# Patient Record
Sex: FEMALE | Race: WHITE | NOT HISPANIC OR LATINO | Employment: UNEMPLOYED | URBAN - METROPOLITAN AREA
[De-identification: names, ages, dates, MRNs, and addresses within clinical notes are randomized per-mention and may not be internally consistent; named-entity substitution may affect disease eponyms.]

---

## 2023-03-22 DIAGNOSIS — F41.9 ANXIETY: Primary | ICD-10-CM

## 2023-03-22 RX ORDER — BUPROPION HYDROCHLORIDE 150 MG/1
TABLET, EXTENDED RELEASE ORAL
COMMUNITY
End: 2023-03-22 | Stop reason: SDUPTHER

## 2023-03-23 RX ORDER — BUPROPION HYDROCHLORIDE 150 MG/1
TABLET, EXTENDED RELEASE ORAL
Qty: 180 TABLET | Refills: 0 | Status: SHIPPED | OUTPATIENT
Start: 2023-03-23 | End: 2023-06-30

## 2023-06-30 DIAGNOSIS — F41.9 ANXIETY: ICD-10-CM

## 2023-06-30 PROBLEM — M54.50 ACUTE LOW BACK PAIN: Status: ACTIVE | Noted: 2023-06-30

## 2023-06-30 PROBLEM — M17.9 ARTHRITIS OF KNEE, DEGENERATIVE: Status: ACTIVE | Noted: 2023-06-30

## 2023-06-30 PROBLEM — K62.82 ANAL DYSPLASIA: Status: ACTIVE | Noted: 2023-06-30

## 2023-06-30 PROBLEM — F33.1 MODERATE EPISODE OF RECURRENT MAJOR DEPRESSIVE DISORDER (MULTI): Status: ACTIVE | Noted: 2023-06-30

## 2023-06-30 PROBLEM — L30.9 DERMATITIS: Status: ACTIVE | Noted: 2023-06-30

## 2023-06-30 PROBLEM — F32.A DEPRESSION: Status: ACTIVE | Noted: 2023-06-30

## 2023-06-30 PROBLEM — M25.569 JOINT PAIN, KNEE: Status: ACTIVE | Noted: 2023-06-30

## 2023-06-30 PROBLEM — M25.579 ANKLE PAIN: Status: ACTIVE | Noted: 2023-06-30

## 2023-06-30 PROBLEM — F45.42 PAIN DISORDER ASSOCIATED WITH PSYCHOLOGICAL AND PHYSICAL FACTORS: Status: ACTIVE | Noted: 2023-06-30

## 2023-06-30 PROBLEM — E78.5 HYPERLIPIDEMIA: Status: ACTIVE | Noted: 2023-06-30

## 2023-06-30 PROBLEM — I42.9 CARDIOMYOPATHY (MULTI): Status: ACTIVE | Noted: 2023-06-30

## 2023-06-30 RX ORDER — ESCITALOPRAM OXALATE 20 MG/1
1 TABLET ORAL DAILY
COMMUNITY
Start: 2023-02-09 | End: 2024-04-04 | Stop reason: SDUPTHER

## 2023-06-30 RX ORDER — ASPIRIN 81 MG/1
1 TABLET ORAL DAILY
COMMUNITY
Start: 2021-06-29

## 2023-06-30 RX ORDER — HYDROXYZINE HYDROCHLORIDE 10 MG/1
TABLET, FILM COATED ORAL
COMMUNITY

## 2023-06-30 RX ORDER — FUROSEMIDE 40 MG/1
1 TABLET ORAL DAILY
COMMUNITY
Start: 2019-10-16

## 2023-06-30 RX ORDER — IBUPROFEN 800 MG/1
TABLET ORAL
COMMUNITY
Start: 2013-05-06

## 2023-06-30 RX ORDER — SENNOSIDES 25 MG/1
TABLET, FILM COATED ORAL
COMMUNITY
Start: 2023-02-02

## 2023-06-30 RX ORDER — VENLAFAXINE HYDROCHLORIDE 225 MG/1
225 TABLET, EXTENDED RELEASE ORAL DAILY
COMMUNITY
End: 2024-04-04 | Stop reason: WASHOUT

## 2023-06-30 RX ORDER — ATORVASTATIN CALCIUM 40 MG/1
40 TABLET, FILM COATED ORAL NIGHTLY
COMMUNITY

## 2023-06-30 RX ORDER — BACITRACIN 500 [USP'U]/G
OINTMENT TOPICAL
COMMUNITY
Start: 2023-03-10

## 2023-06-30 RX ORDER — BENZONATATE 100 MG/1
CAPSULE ORAL
COMMUNITY
Start: 2014-01-23

## 2023-06-30 RX ORDER — OXYCODONE HYDROCHLORIDE 5 MG/1
CAPSULE ORAL
COMMUNITY
Start: 2023-03-10 | End: 2024-04-04 | Stop reason: WASHOUT

## 2023-06-30 RX ORDER — ACETAMINOPHEN 325 MG/1
TABLET ORAL
COMMUNITY
Start: 2023-03-10

## 2023-06-30 RX ORDER — BUPROPION HYDROCHLORIDE 150 MG/1
TABLET, EXTENDED RELEASE ORAL
Qty: 180 TABLET | Refills: 0 | Status: SHIPPED | OUTPATIENT
Start: 2023-06-30 | End: 2023-10-02

## 2023-06-30 RX ORDER — CARVEDILOL 6.25 MG/1
2 TABLET ORAL 2 TIMES DAILY
COMMUNITY
Start: 2019-12-09 | End: 2024-03-04 | Stop reason: SDUPTHER

## 2023-06-30 RX ORDER — POLYETHYLENE GLYCOL 3350 17 G/17G
POWDER, FOR SOLUTION ORAL
COMMUNITY
Start: 2023-03-10

## 2023-06-30 RX ORDER — RAMIPRIL 5 MG/1
10 CAPSULE ORAL DAILY
COMMUNITY
End: 2024-02-27 | Stop reason: SDUPTHER

## 2023-06-30 RX ORDER — LIDOCAINE HYDROCHLORIDE AND HYDROCORTISONE ACETATE 30; 25 MG/G; MG/G
GEL RECTAL
COMMUNITY
Start: 2021-07-16

## 2023-10-02 DIAGNOSIS — F41.9 ANXIETY: ICD-10-CM

## 2023-10-02 RX ORDER — BUPROPION HYDROCHLORIDE 150 MG/1
TABLET, EXTENDED RELEASE ORAL
Qty: 180 TABLET | Refills: 0 | Status: SHIPPED | OUTPATIENT
Start: 2023-10-02 | End: 2024-01-03

## 2024-01-03 DIAGNOSIS — F41.9 ANXIETY: ICD-10-CM

## 2024-01-03 RX ORDER — BUPROPION HYDROCHLORIDE 150 MG/1
TABLET, EXTENDED RELEASE ORAL
Qty: 180 TABLET | Refills: 0 | Status: SHIPPED | OUTPATIENT
Start: 2024-01-03 | End: 2024-03-27

## 2024-01-11 ENCOUNTER — OFFICE VISIT (OUTPATIENT)
Dept: SURGERY | Facility: CLINIC | Age: 64
End: 2024-01-11
Payer: COMMERCIAL

## 2024-01-11 VITALS
BODY MASS INDEX: 27.64 KG/M2 | HEART RATE: 60 BPM | RESPIRATION RATE: 16 BRPM | WEIGHT: 137.1 LBS | SYSTOLIC BLOOD PRESSURE: 148 MMHG | HEIGHT: 59 IN | DIASTOLIC BLOOD PRESSURE: 88 MMHG

## 2024-01-11 DIAGNOSIS — K62.82 ANAL DYSPLASIA: Primary | ICD-10-CM

## 2024-01-11 PROCEDURE — 99213 OFFICE O/P EST LOW 20 MIN: CPT | Performed by: SURGERY

## 2024-01-11 PROCEDURE — 46600 DIAGNOSTIC ANOSCOPY SPX: CPT | Performed by: SURGERY

## 2024-01-11 NOTE — PROGRESS NOTES
No chief complaint on file.      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 She has been treated by Dr. Enriquez in the past with botox injections. she has not noticed any new lesions or changes.     She is still dealing with her chronic back pain. She is seeing her PCP next week. She has been having urgency with BM's. Her stools are soft. She was taking Meloxicam previously and contributes that to her loose stools. She has been taking probiotics as well which she thinks have helped.     HIV status: negative  Smoking status: current smoker    Previous High Resolution Anoscopies and Cytologies have shown:     Colonoscopy 8/25/21 ([facility])- Complete to ileocecal valve. No polyps or other abnormalities seen.   8/2021 HRA: LSIL & HSIL   10/2021 HRA: Focally invasive moderately differentiated scc arising from a background of HSIL   3/2023 HRA: HSIL     Past Medical History  She  Past Medical History:   Diagnosis Date    Personal history of other diseases of the musculoskeletal system and connective tissue     Personal history of arthritis       Surgical History  She  Past Surgical History:   Procedure Laterality Date    OTHER SURGICAL HISTORY  07/01/2021    Vascular bypass        Social History  She has no history on file for tobacco use, alcohol use, and drug use.    Family History  No family history on file.     Allergies  Pregabalin, Sulfa (sulfonamide antibiotics), and Sulfamethoxazole    Home Medications  Prior to Admission medications    Medication Sig Start Date End Date Taking? Authorizing Provider   acetaminophen (Tylenol) 325 mg tablet TAKE 2 TABLETS BY MOUTH EVERY 6 HOURS FOR PAIN 3/10/23  Yes Historical Provider, MD   aspirin 81 mg EC tablet Take 1 tablet (81 mg) by mouth once daily. 6/29/21  Yes Historical Provider, MD   atorvastatin (Lipitor) 40 mg tablet Take 1 tablet (40 mg) by mouth once daily at bedtime.   Yes  Historical Provider, MD   bacitracin 500 unit/gram ointment APPLY TOPICALLY TO AFFECTED AREA ONCE A DAY TO PREVENT ADHERENCE TO GAUZE DRESSINGS 3/10/23  Yes Historical Provider, MD   buPROPion SR (Wellbutrin SR) 150 mg 12 hr tablet TAKE 1 TABLET BY MOUTH DAILY FOR 3 DAYS THEN INCREASE TO 1 TABLET TWICE DAILY 1/3/24  Yes Dari Negro MD   carvedilol (Coreg) 6.25 mg tablet Take 2 tablets (12.5 mg) by mouth 2 times a day. 12/9/19  Yes Historical Provider, MD   furosemide (Lasix) 40 mg tablet Take 1 tablet (40 mg) by mouth once daily. 10/16/19  Yes Historical Provider, MD   hydrOXYzine HCL (Atarax) 10 mg tablet Take by mouth.   Yes Historical Provider, MD   ibuprofen 800 mg tablet Take by mouth. 5/6/13  Yes Historical Provider, MD   oxyCODONE (Oxy-IR) 5 mg immediate release capsule TAKE 1 CAPSULE BY MOUTH EVERY 6 TO 8 HOURS AS NEEDED FOR PAIN 3/10/23  Yes Historical Provider, MD   polyethylene glycol (Glycolax) 17 gram/dose powder MIX AND DRINK 17 GRAMS BY MOUTH ONCE DAILY TO PREVENT CONSTIPATION 3/10/23  Yes Historical Provider, MD   ramipril (Altace) 5 mg capsule Take 2 capsules (10 mg) by mouth once daily.   Yes Historical Provider, MD   venlafaxine 225 mg 24 hr tablet Take 1 tablet (225 mg) by mouth once daily.   Yes Historical Provider, MD   benzonatate (Tessalon) 100 mg capsule Take by mouth. 1/23/14   Historical Provider, MD   escitalopram (Lexapro) 20 mg tablet Take 1 tablet (20 mg) by mouth once daily. 2/9/23   Historical Provider, MD   lidocaine (Xylocaine) 5 % cream cream USE AS DIRECTED. 2/2/23   Historical Provider, MD   lidocaine-hydrocortisone-aloe 3-2.5 % (7 gram) kit Insert into the rectum. 7/16/21   Historical Provider, MD       Review of Systems     Physical Exam    Perineal/UMAIR:  Perineum: well healed, some scarring  UMAIR: normal  Tone: normal    Anoscopy    Date/Time: 1/11/2024 12:06 PM    Performed by: Josie Lepe MD  Authorized by: Josie Lepe MD    Consent:     Consent  "obtained:  Verbal    Consent given by:  Patient    Risks, benefits, and alternatives were discussed: yes      Risks discussed:  Bleeding and pain  Universal protocol:     Procedure explained and questions answered to patient or proxy's satisfaction: yes      Patient identity confirmed:  Verbally with patient  Indications:     Indications comment:  Anal dysplasia  Post-procedure details:     Procedure completion:  Tolerated well, no immediate complications      Last Recorded Vitals  Blood pressure 148/88, pulse 60, resp. rate 16, height 1.499 m (4' 11\"), weight 62.2 kg (137 lb 1.6 oz).      Assessment and Plan  63 y.o. female here for follow up of anal dysplasia.     Normal exam today. Doing really well.     I emphasized the significant possibility of recurrence and the need for close follow-up. We discussed barrier protection when sexually active, non-smoking, and taking HIV medications.    "

## 2024-01-16 ENCOUNTER — APPOINTMENT (OUTPATIENT)
Dept: SURGERY | Facility: CLINIC | Age: 64
End: 2024-01-16
Payer: COMMERCIAL

## 2024-02-26 ENCOUNTER — TELEPHONE (OUTPATIENT)
Dept: PRIMARY CARE | Facility: CLINIC | Age: 64
End: 2024-02-26
Payer: COMMERCIAL

## 2024-02-27 DIAGNOSIS — I10 HTN (HYPERTENSION), BENIGN: Primary | ICD-10-CM

## 2024-02-27 RX ORDER — RAMIPRIL 10 MG/1
10 CAPSULE ORAL DAILY
Qty: 90 CAPSULE | Refills: 1 | Status: SHIPPED | OUTPATIENT
Start: 2024-02-27

## 2024-03-04 DIAGNOSIS — I42.9 CARDIOMYOPATHY, UNSPECIFIED TYPE (MULTI): Primary | ICD-10-CM

## 2024-03-05 RX ORDER — CARVEDILOL 6.25 MG/1
12.5 TABLET ORAL 2 TIMES DAILY
Qty: 360 TABLET | Refills: 1 | Status: SHIPPED | OUTPATIENT
Start: 2024-03-05

## 2024-03-27 DIAGNOSIS — F41.9 ANXIETY: ICD-10-CM

## 2024-03-27 RX ORDER — BUPROPION HYDROCHLORIDE 150 MG/1
TABLET, EXTENDED RELEASE ORAL
Qty: 180 TABLET | Refills: 3 | Status: SHIPPED | OUTPATIENT
Start: 2024-03-27 | End: 2024-04-04 | Stop reason: WASHOUT

## 2024-04-03 ASSESSMENT — PROMIS GLOBAL HEALTH SCALE
RATE_SOCIAL_SATISFACTION: FAIR
CARRYOUT_PHYSICAL_ACTIVITIES: MODERATELY
RATE_MENTAL_HEALTH: GOOD
EMOTIONAL_PROBLEMS: SOMETIMES
RATE_QUALITY_OF_LIFE: FAIR
RATE_AVERAGE_FATIGUE: MODERATE
RATE_GENERAL_HEALTH: GOOD
RATE_AVERAGE_PAIN: 8
CARRYOUT_SOCIAL_ACTIVITIES: POOR
RATE_PHYSICAL_HEALTH: FAIR

## 2024-04-04 ENCOUNTER — OFFICE VISIT (OUTPATIENT)
Dept: PRIMARY CARE | Facility: CLINIC | Age: 64
End: 2024-04-04
Payer: COMMERCIAL

## 2024-04-04 VITALS
WEIGHT: 141 LBS | HEART RATE: 65 BPM | BODY MASS INDEX: 30.42 KG/M2 | HEIGHT: 57 IN | DIASTOLIC BLOOD PRESSURE: 80 MMHG | SYSTOLIC BLOOD PRESSURE: 130 MMHG | OXYGEN SATURATION: 95 %

## 2024-04-04 DIAGNOSIS — F32.A DEPRESSION, UNSPECIFIED DEPRESSION TYPE: ICD-10-CM

## 2024-04-04 DIAGNOSIS — K21.9 GASTROESOPHAGEAL REFLUX DISEASE, UNSPECIFIED WHETHER ESOPHAGITIS PRESENT: ICD-10-CM

## 2024-04-04 DIAGNOSIS — E78.5 HYPERLIPIDEMIA, UNSPECIFIED HYPERLIPIDEMIA TYPE: ICD-10-CM

## 2024-04-04 DIAGNOSIS — F17.200 SMOKER: ICD-10-CM

## 2024-04-04 DIAGNOSIS — Z00.00 HEALTH MAINTENANCE EXAMINATION: Primary | ICD-10-CM

## 2024-04-04 DIAGNOSIS — Z12.31 ENCOUNTER FOR SCREENING MAMMOGRAM FOR MALIGNANT NEOPLASM OF BREAST: ICD-10-CM

## 2024-04-04 LAB
25(OH)D3 SERPL-MCNC: 34 NG/ML (ref 30–100)
ALBUMIN SERPL BCP-MCNC: 4.4 G/DL (ref 3.4–5)
ALP SERPL-CCNC: 124 U/L (ref 33–136)
ALT SERPL W P-5'-P-CCNC: 16 U/L (ref 7–45)
ANION GAP SERPL CALC-SCNC: 14 MMOL/L (ref 10–20)
AST SERPL W P-5'-P-CCNC: 17 U/L (ref 9–39)
BILIRUB SERPL-MCNC: 0.5 MG/DL (ref 0–1.2)
BUN SERPL-MCNC: 10 MG/DL (ref 6–23)
CALCIUM SERPL-MCNC: 9.6 MG/DL (ref 8.6–10.6)
CHLORIDE SERPL-SCNC: 102 MMOL/L (ref 98–107)
CHOLEST SERPL-MCNC: 147 MG/DL (ref 0–199)
CHOLESTEROL/HDL RATIO: 4.1
CO2 SERPL-SCNC: 29 MMOL/L (ref 21–32)
CREAT SERPL-MCNC: 0.7 MG/DL (ref 0.5–1.05)
EGFRCR SERPLBLD CKD-EPI 2021: >90 ML/MIN/1.73M*2
GLUCOSE SERPL-MCNC: NORMAL MG/DL
HDLC SERPL-MCNC: 35.7 MG/DL
LDLC SERPL CALC-MCNC: 84 MG/DL
NON HDL CHOLESTEROL: 111 MG/DL (ref 0–149)
POTASSIUM SERPL-SCNC: 4.3 MMOL/L (ref 3.5–5.3)
PROT SERPL-MCNC: 6.5 G/DL (ref 6.4–8.2)
SODIUM SERPL-SCNC: 141 MMOL/L (ref 136–145)
TRIGL SERPL-MCNC: 135 MG/DL (ref 0–149)
TSH SERPL-ACNC: 1.88 MIU/L (ref 0.44–3.98)
VLDL: 27 MG/DL (ref 0–40)

## 2024-04-04 PROCEDURE — 85027 COMPLETE CBC AUTOMATED: CPT

## 2024-04-04 PROCEDURE — 84155 ASSAY OF PROTEIN SERUM: CPT

## 2024-04-04 PROCEDURE — 82310 ASSAY OF CALCIUM: CPT

## 2024-04-04 PROCEDURE — 99213 OFFICE O/P EST LOW 20 MIN: CPT | Performed by: FAMILY MEDICINE

## 2024-04-04 PROCEDURE — 82247 BILIRUBIN TOTAL: CPT

## 2024-04-04 PROCEDURE — 36415 COLL VENOUS BLD VENIPUNCTURE: CPT

## 2024-04-04 PROCEDURE — 83036 HEMOGLOBIN GLYCOSYLATED A1C: CPT

## 2024-04-04 PROCEDURE — 84520 ASSAY OF UREA NITROGEN: CPT

## 2024-04-04 PROCEDURE — 84443 ASSAY THYROID STIM HORMONE: CPT

## 2024-04-04 PROCEDURE — 82565 ASSAY OF CREATININE: CPT

## 2024-04-04 PROCEDURE — 99396 PREV VISIT EST AGE 40-64: CPT | Performed by: FAMILY MEDICINE

## 2024-04-04 PROCEDURE — 84450 TRANSFERASE (AST) (SGOT): CPT

## 2024-04-04 PROCEDURE — 80051 ELECTROLYTE PANEL: CPT

## 2024-04-04 PROCEDURE — 4004F PT TOBACCO SCREEN RCVD TLK: CPT | Performed by: FAMILY MEDICINE

## 2024-04-04 PROCEDURE — 84075 ASSAY ALKALINE PHOSPHATASE: CPT

## 2024-04-04 PROCEDURE — 82040 ASSAY OF SERUM ALBUMIN: CPT

## 2024-04-04 PROCEDURE — 84460 ALANINE AMINO (ALT) (SGPT): CPT

## 2024-04-04 PROCEDURE — 82306 VITAMIN D 25 HYDROXY: CPT

## 2024-04-04 PROCEDURE — 80061 LIPID PANEL: CPT

## 2024-04-04 RX ORDER — ESCITALOPRAM OXALATE 20 MG/1
20 TABLET ORAL DAILY
Qty: 90 TABLET | Refills: 1 | Status: SHIPPED | OUTPATIENT
Start: 2024-04-04

## 2024-04-04 RX ORDER — PANTOPRAZOLE SODIUM 40 MG/1
40 TABLET, DELAYED RELEASE ORAL DAILY
Qty: 30 TABLET | Refills: 1 | Status: SHIPPED | OUTPATIENT
Start: 2024-04-04 | End: 2024-05-29

## 2024-04-04 RX ORDER — BUPROPION HYDROCHLORIDE 300 MG/1
300 TABLET ORAL EVERY MORNING
Qty: 90 TABLET | Refills: 1 | Status: SHIPPED | OUTPATIENT
Start: 2024-04-04

## 2024-04-04 ASSESSMENT — ENCOUNTER SYMPTOMS
LOSS OF SENSATION IN FEET: 0
DEPRESSION: 0
OCCASIONAL FEELINGS OF UNSTEADINESS: 0

## 2024-04-04 ASSESSMENT — PATIENT HEALTH QUESTIONNAIRE - PHQ9
6. FEELING BAD ABOUT YOURSELF - OR THAT YOU ARE A FAILURE OR HAVE LET YOURSELF OR YOUR FAMILY DOWN: NOT AT ALL
SUM OF ALL RESPONSES TO PHQ QUESTIONS 1-9: 13
5. POOR APPETITE OR OVEREATING: NEARLY EVERY DAY
10. IF YOU CHECKED OFF ANY PROBLEMS, HOW DIFFICULT HAVE THESE PROBLEMS MADE IT FOR YOU TO DO YOUR WORK, TAKE CARE OF THINGS AT HOME, OR GET ALONG WITH OTHER PEOPLE: SOMEWHAT DIFFICULT
3. TROUBLE FALLING OR STAYING ASLEEP OR SLEEPING TOO MUCH: NEARLY EVERY DAY
2. FEELING DOWN, DEPRESSED OR HOPELESS: MORE THAN HALF THE DAYS
9. THOUGHTS THAT YOU WOULD BE BETTER OFF DEAD, OR OF HURTING YOURSELF: NOT AT ALL
1. LITTLE INTEREST OR PLEASURE IN DOING THINGS: MORE THAN HALF THE DAYS
4. FEELING TIRED OR HAVING LITTLE ENERGY: NEARLY EVERY DAY
SUM OF ALL RESPONSES TO PHQ9 QUESTIONS 1 AND 2: 4
8. MOVING OR SPEAKING SO SLOWLY THAT OTHER PEOPLE COULD HAVE NOTICED. OR THE OPPOSITE, BEING SO FIGETY OR RESTLESS THAT YOU HAVE BEEN MOVING AROUND A LOT MORE THAN USUAL: NOT AT ALL
7. TROUBLE CONCENTRATING ON THINGS, SUCH AS READING THE NEWSPAPER OR WATCHING TELEVISION: NOT AT ALL

## 2024-04-04 NOTE — ASSESSMENT & PLAN NOTE
Recommended screening guidelines addressed and orders placed as indicated by age and chronic conditions  Screening labs ordered, will call with results  Encourage pt to get mammogram  Smoking cessation encouraged at length, will obtain lung CT for screening  Continue to work on healthy lifestyle including well balanced diet, regular activity, limit alcohol, no tobacco products and safe sexual practices  Follow up annually

## 2024-04-04 NOTE — PROGRESS NOTES
Reason for Visit: Annual Physical Exam    HPI:  HTN: taking medications as prescribed    Depression: continues to struggle.  Was seeing therapist and psychiatrist virtually but did not like doing that.  Was on another medication instead of Lexapro but did not feel like it helped.  Very overwhelmed with tasks.  Difficult to sleep. No Si/HI. Interested in therapy.    Continues to smoke, tries to quit but has difficulty.  Has never gone extended time without smoking.    Continues to follow with colorectal for anal dysplasia, normal recent anoscopy.      Feels that acid reflux is significant.  Gets abdominal bloating with food.     Active Problem List  Patient Active Problem List   Diagnosis    Acute low back pain    Anal dysplasia    Ankle pain    Arthritis of knee, degenerative    Cardiomyopathy (CMS/HCC)    Depression    Dermatitis    Hyperlipidemia    Joint pain, knee    Moderate episode of recurrent major depressive disorder (CMS/HCC)    Pain disorder associated with psychological and physical factors    Gastroesophageal reflux disease    Health maintenance examination       Comprehensive Medical/Surgical/Social/Family History  Past Medical History:   Diagnosis Date    Personal history of other diseases of the musculoskeletal system and connective tissue     Personal history of arthritis     Past Surgical History:   Procedure Laterality Date    OTHER SURGICAL HISTORY  07/01/2021    Vascular bypass     Social History     Tobacco Use    Smoking status: Every Day     Packs/day: 1.00     Years: 15.00     Additional pack years: 0.00     Total pack years: 15.00     Types: Cigarettes    Smokeless tobacco: Never   Substance Use Topics    Alcohol use: Never    Drug use: Never     No family history on file.      Allergies and Medications  Pregabalin, Sulfa (sulfonamide antibiotics), and Sulfamethoxazole  Current Outpatient Medications on File Prior to Visit   Medication Sig Dispense Refill    acetaminophen (Tylenol) 325 mg  "tablet TAKE 2 TABLETS BY MOUTH EVERY 6 HOURS FOR PAIN      aspirin 81 mg EC tablet Take 1 tablet (81 mg) by mouth once daily.      atorvastatin (Lipitor) 40 mg tablet Take 1 tablet (40 mg) by mouth once daily at bedtime.      bacitracin 500 unit/gram ointment APPLY TOPICALLY TO AFFECTED AREA ONCE A DAY TO PREVENT ADHERENCE TO GAUZE DRESSINGS      carvedilol (Coreg) 6.25 mg tablet Take 2 tablets (12.5 mg) by mouth 2 times a day. 360 tablet 1    furosemide (Lasix) 40 mg tablet Take 1 tablet (40 mg) by mouth once daily.      hydrOXYzine HCL (Atarax) 10 mg tablet Take by mouth.      ibuprofen 800 mg tablet Take by mouth.      lidocaine (Xylocaine) 5 % cream cream USE AS DIRECTED.      ramipril (Altace) 10 mg capsule Take 1 capsule (10 mg) by mouth once daily. 90 capsule 1    [DISCONTINUED] buPROPion SR (Wellbutrin SR) 150 mg 12 hr tablet TAKE 1 TABLET BY MOUTH DAILY X3 DAYS, THEN INCREASE TO 1 TABLET TWICE DAILY 180 tablet 3    [DISCONTINUED] escitalopram (Lexapro) 20 mg tablet Take 1 tablet (20 mg) by mouth once daily.      benzonatate (Tessalon) 100 mg capsule Take by mouth.      lidocaine-hydrocortisone-aloe 3-2.5 % (7 gram) kit Insert into the rectum.      polyethylene glycol (Glycolax) 17 gram/dose powder MIX AND DRINK 17 GRAMS BY MOUTH ONCE DAILY TO PREVENT CONSTIPATION      [DISCONTINUED] oxyCODONE (Oxy-IR) 5 mg immediate release capsule TAKE 1 CAPSULE BY MOUTH EVERY 6 TO 8 HOURS AS NEEDED FOR PAIN      [DISCONTINUED] venlafaxine 225 mg 24 hr tablet Take 1 tablet (225 mg) by mouth once daily.       No current facility-administered medications on file prior to visit.         ROS otherwise negative aside from what was mentioned above in HPI.    Vitals  /80 (BP Location: Right arm, Patient Position: Sitting, BP Cuff Size: Adult)   Pulse 65   Ht 1.448 m (4' 9\")   Wt 64 kg (141 lb)   LMP  (LMP Unknown)   SpO2 95%   BMI 30.51 kg/m²   Body mass index is 30.51 kg/m².  Physical Exam  Gen: Alert, NAD  HEENT:  " PERRL, EOMI, conjunctiva and sclera normal in appearance. External auditory canals/TMs normal; Oral cavity and posterior pharynx without lesions/exudate  Neck:  Supple with FROM; No masses/nodes palpable; Thyroid nontender and without nodules; No ELIJAH  Respiratory:  Lungs CTAB  Cardiovascular:  Heart RRR. No M/R/G. Peripheral pulses equal bilaterally  Abdomen:  Soft, nontender, No R/G/R; No HSM or masses palpated  Extremities:  FROM all extremities; Muscle strength grossly normal with good tone  Neuro:  CN II-XII intact; Gross motor and sensory intact  Skin:  No suspicious lesions present    Assessment and Plan:  Problem List Items Addressed This Visit       Depression    Current Assessment & Plan     Will increase Wellbutrin to 300mg daily, can consider goal of 450mg if tolerating  Continue Lexapro  Referral to [facility] for therapy  Continue to monitor         Relevant Medications    buPROPion XL (Wellbutrin XL) 300 mg 24 hr tablet    escitalopram (Lexapro) 20 mg tablet    Other Relevant Orders    TSH with reflex to Free T4 if abnormal    Follow [facility]    Hyperlipidemia    Relevant Orders    Lipid Panel    Hemoglobin A1C    Gastroesophageal reflux disease    Current Assessment & Plan     One month trial of PPI  If symptoms fail to improve can consider GI referral         Relevant Medications    pantoprazole (ProtoNix) 40 mg EC tablet    Other Relevant Orders    CBC    Comprehensive Metabolic Panel    Health maintenance examination - Primary    Current Assessment & Plan     Recommended screening guidelines addressed and orders placed as indicated by age and chronic conditions  Screening labs ordered, will call with results  Encourage pt to get mammogram  Smoking cessation encouraged at length, will obtain lung CT for screening  Continue to work on healthy lifestyle including well balanced diet, regular activity, limit alcohol, no tobacco  products and safe sexual practices  Follow up annually           Relevant Orders    Comprehensive Metabolic Panel    TSH with reflex to Free T4 if abnormal    Lipid Panel    Hemoglobin A1C    Vitamin D 25-Hydroxy,Total (for eval of Vitamin D levels)     Other Visit Diagnoses       Smoker        Relevant Orders    CT lung screening low dose    Encounter for screening mammogram for malignant neoplasm of breast        Relevant Orders    BI mammo bilateral screening tomosynthesis              Dari Negro MD

## 2024-04-04 NOTE — ASSESSMENT & PLAN NOTE
Will increase Wellbutrin to 300mg daily, can consider goal of 450mg if tolerating  Continue Lexapro  Referral to [facility] for therapy  Continue to monitor

## 2024-04-05 LAB
ERYTHROCYTE [DISTWIDTH] IN BLOOD BY AUTOMATED COUNT: 13 % (ref 11.5–14.5)
EST. AVERAGE GLUCOSE BLD GHB EST-MCNC: 103 MG/DL
HBA1C MFR BLD: 5.2 %
HCT VFR BLD AUTO: 45.7 % (ref 36–46)
HGB BLD-MCNC: 15.1 G/DL (ref 12–16)
MCH RBC QN AUTO: 31.1 PG (ref 26–34)
MCHC RBC AUTO-ENTMCNC: 33 G/DL (ref 32–36)
MCV RBC AUTO: 94 FL (ref 80–100)
NRBC BLD-RTO: 0 /100 WBCS (ref 0–0)
PLATELET # BLD AUTO: 180 X10*3/UL (ref 150–450)
RBC # BLD AUTO: 4.86 X10*6/UL (ref 4–5.2)
WBC # BLD AUTO: 6.4 X10*3/UL (ref 4.4–11.3)

## 2024-04-17 ENCOUNTER — HOSPITAL ENCOUNTER (OUTPATIENT)
Dept: RADIOLOGY | Facility: CLINIC | Age: 64
Discharge: HOME | End: 2024-04-17
Payer: COMMERCIAL

## 2024-04-17 VITALS — BODY MASS INDEX: 29.12 KG/M2 | HEIGHT: 57 IN | WEIGHT: 135 LBS

## 2024-04-17 DIAGNOSIS — Z12.31 ENCOUNTER FOR SCREENING MAMMOGRAM FOR MALIGNANT NEOPLASM OF BREAST: ICD-10-CM

## 2024-04-17 PROCEDURE — 77063 BREAST TOMOSYNTHESIS BI: CPT | Performed by: RADIOLOGY

## 2024-04-17 PROCEDURE — 77067 SCR MAMMO BI INCL CAD: CPT

## 2024-04-17 PROCEDURE — 77067 SCR MAMMO BI INCL CAD: CPT | Performed by: RADIOLOGY

## 2024-04-19 DIAGNOSIS — R92.8 ABNORMAL MAMMOGRAM OF LEFT BREAST: Primary | ICD-10-CM

## 2024-04-24 ENCOUNTER — HOSPITAL ENCOUNTER (OUTPATIENT)
Dept: RADIOLOGY | Facility: CLINIC | Age: 64
Discharge: HOME | End: 2024-04-24
Payer: COMMERCIAL

## 2024-04-24 DIAGNOSIS — R92.8 ABNORMAL MAMMOGRAM OF LEFT BREAST: ICD-10-CM

## 2024-04-24 PROCEDURE — 76642 ULTRASOUND BREAST LIMITED: CPT | Mod: LT

## 2024-04-24 PROCEDURE — 77061 BREAST TOMOSYNTHESIS UNI: CPT | Mod: LEFT SIDE | Performed by: STUDENT IN AN ORGANIZED HEALTH CARE EDUCATION/TRAINING PROGRAM

## 2024-04-24 PROCEDURE — 76642 ULTRASOUND BREAST LIMITED: CPT | Mod: LEFT SIDE | Performed by: STUDENT IN AN ORGANIZED HEALTH CARE EDUCATION/TRAINING PROGRAM

## 2024-04-24 PROCEDURE — 77065 DX MAMMO INCL CAD UNI: CPT | Mod: LEFT SIDE | Performed by: STUDENT IN AN ORGANIZED HEALTH CARE EDUCATION/TRAINING PROGRAM

## 2024-04-24 PROCEDURE — 77061 BREAST TOMOSYNTHESIS UNI: CPT | Mod: LT

## 2024-04-29 ENCOUNTER — APPOINTMENT (OUTPATIENT)
Dept: RADIOLOGY | Facility: CLINIC | Age: 64
End: 2024-04-29
Payer: COMMERCIAL

## 2024-05-09 ENCOUNTER — APPOINTMENT (OUTPATIENT)
Dept: SURGERY | Facility: CLINIC | Age: 64
End: 2024-05-09
Payer: COMMERCIAL

## 2024-05-29 DIAGNOSIS — K21.9 GASTROESOPHAGEAL REFLUX DISEASE, UNSPECIFIED WHETHER ESOPHAGITIS PRESENT: ICD-10-CM

## 2024-05-29 RX ORDER — PANTOPRAZOLE SODIUM 40 MG/1
TABLET, DELAYED RELEASE ORAL
Qty: 30 TABLET | Refills: 1 | Status: SHIPPED | OUTPATIENT
Start: 2024-05-29

## 2024-06-05 NOTE — PROGRESS NOTES
History Of Present Illness    Subjective   Amee You is here for follow up of anal dysplasia. She also has a history of anal fissure for which she has undergone Botox injections with Dr. Enriquez.  She {has/has not:20194} has not noticed any new lesions or changes.     HIV status: negative  Smoking status: current smoker    Previous High Resolution Anoscopies and Cytologies have shown:   Colonoscopy 8/25/21 ([facility])- Complete to ileocecal valve. No polyps or other abnormalities seen.   8/2021 HRA: LSIL & HSIL   10/2021 HRA: Focally invasive moderately differentiated scc arising from a background of HSIL   3/2023 HRA: HSIL     Past Medical History  She  Past Medical History:   Diagnosis Date    Arthritis        Surgical History  She  Past Surgical History:   Procedure Laterality Date    OTHER SURGICAL HISTORY  07/01/2021    Vascular bypass        Social History  She reports that she has been smoking cigarettes. She has a 15 pack-year smoking history. She has never used smokeless tobacco. She reports that she does not drink alcohol and does not use drugs.    Family History  Family History   Problem Relation Name Age of Onset    Ovarian cancer Mother      Breast cancer Paternal Grandmother          Allergies  Pregabalin, Sulfa (sulfonamide antibiotics), and Sulfamethoxazole    Home Medications  Prior to Admission medications    Medication Sig Start Date End Date Taking? Authorizing Provider   acetaminophen (Tylenol) 325 mg tablet TAKE 2 TABLETS BY MOUTH EVERY 6 HOURS FOR PAIN 3/10/23   Historical Provider, MD   aspirin 81 mg EC tablet Take 1 tablet (81 mg) by mouth once daily. 6/29/21   Historical Provider, MD   atorvastatin (Lipitor) 40 mg tablet Take 1 tablet (40 mg) by mouth once daily at bedtime.    Historical Provider, MD   bacitracin 500 unit/gram ointment APPLY TOPICALLY TO AFFECTED AREA ONCE A DAY TO PREVENT ADHERENCE TO GAUZE DRESSINGS 3/10/23   Historical Provider, MD   benzonatate (Tessalon) 100 mg  capsule Take by mouth. 1/23/14   Historical Provider, MD   buPROPion XL (Wellbutrin XL) 300 mg 24 hr tablet Take 1 tablet (300 mg) by mouth once daily in the morning. Do not crush, chew, or split. 4/4/24   Dari Negro MD   carvedilol (Coreg) 6.25 mg tablet Take 2 tablets (12.5 mg) by mouth 2 times a day. 3/5/24   Dari Negro MD   escitalopram (Lexapro) 20 mg tablet Take 1 tablet (20 mg) by mouth once daily. 4/4/24   Dari Negro MD   furosemide (Lasix) 40 mg tablet Take 1 tablet (40 mg) by mouth once daily. 10/16/19   Historical Provider, MD   hydrOXYzine HCL (Atarax) 10 mg tablet Take by mouth.    Historical Provider, MD   ibuprofen 800 mg tablet Take by mouth. 5/6/13   Historical Provider, MD   lidocaine (Xylocaine) 5 % cream cream USE AS DIRECTED. 2/2/23   Historical Provider, MD   lidocaine-hydrocortisone-aloe 3-2.5 % (7 gram) kit Insert into the rectum. 7/16/21   Historical Provider, MD   pantoprazole (ProtoNix) 40 mg EC tablet TAKE 1 TABLET(40 MG) BY MOUTH DAILY. DO NOT CRUSH, CHEW, OR SPLIT 5/29/24   Dari Negro MD   polyethylene glycol (Glycolax) 17 gram/dose powder MIX AND DRINK 17 GRAMS BY MOUTH ONCE DAILY TO PREVENT CONSTIPATION 3/10/23   Historical Provider, MD   ramipril (Altace) 10 mg capsule Take 1 capsule (10 mg) by mouth once daily. 2/27/24   Dari Negro MD       Review of Systems     Physical Exam    Perineal/UMAIR:  Perineum:   UMAIR:   Tone:     Procedures    Last Recorded Vitals  There were no vitals taken for this visit.      Assessment and Plan  63 y.o. female here for follow up of anal dysplasia.     Depending on anal cytology results may recommend HRA. If cytology normal, will follow up with clinical examination in 6 months.     I emphasized the significant possibility of recurrence and the need for close follow-up. We discussed barrier protection when sexually active, non-smoking, and taking HIV medications.

## 2024-06-06 ENCOUNTER — APPOINTMENT (OUTPATIENT)
Dept: SURGERY | Facility: CLINIC | Age: 64
End: 2024-06-06
Payer: COMMERCIAL

## 2024-06-13 ENCOUNTER — APPOINTMENT (OUTPATIENT)
Dept: PRIMARY CARE | Facility: CLINIC | Age: 64
End: 2024-06-13
Payer: COMMERCIAL

## 2024-06-26 ENCOUNTER — APPOINTMENT (OUTPATIENT)
Dept: PRIMARY CARE | Facility: CLINIC | Age: 64
End: 2024-06-26
Payer: COMMERCIAL

## 2024-06-26 ASSESSMENT — PATIENT HEALTH QUESTIONNAIRE - PHQ9
SUM OF ALL RESPONSES TO PHQ QUESTIONS 1-9: 11
10. IF YOU CHECKED OFF ANY PROBLEMS, HOW DIFFICULT HAVE THESE PROBLEMS MADE IT FOR YOU TO DO YOUR WORK, TAKE CARE OF THINGS AT HOME, OR GET ALONG WITH OTHER PEOPLE: VERY DIFFICULT
8. MOVING OR SPEAKING SO SLOWLY THAT OTHER PEOPLE COULD HAVE NOTICED. OR THE OPPOSITE, BEING SO FIGETY OR RESTLESS THAT YOU HAVE BEEN MOVING AROUND A LOT MORE THAN USUAL: NOT AT ALL
3. TROUBLE FALLING OR STAYING ASLEEP: SEVERAL DAYS
5. POOR APPETITE OR OVEREATING: SEVERAL DAYS
4. FEELING TIRED OR HAVING LITTLE ENERGY: MORE THAN HALF THE DAYS
1. LITTLE INTEREST OR PLEASURE IN DOING THINGS: SEVERAL DAYS
2. FEELING DOWN, DEPRESSED OR HOPELESS: MORE THAN HALF THE DAYS
6. FEELING BAD ABOUT YOURSELF - OR THAT YOU ARE A FAILURE OR HAVE LET YOURSELF OR YOUR FAMILY DOWN: MORE THAN HALF THE DAYS
SUM OF ALL RESPONSES TO PHQ9 QUESTIONS 1 & 2: 3
7. TROUBLE CONCENTRATING ON THINGS, SUCH AS READING THE NEWSPAPER OR WATCHING TELEVISION: MORE THAN HALF THE DAYS
9. THOUGHTS THAT YOU WOULD BE BETTER OFF DEAD, OR OF HURTING YOURSELF: NOT AT ALL

## 2024-06-26 ASSESSMENT — ANXIETY QUESTIONNAIRES
7. FEELING AFRAID AS IF SOMETHING AWFUL MIGHT HAPPEN: NOT AT ALL
5. BEING SO RESTLESS THAT IT IS HARD TO SIT STILL: MORE THAN HALF THE DAYS
3. WORRYING TOO MUCH ABOUT DIFFERENT THINGS: NEARLY EVERY DAY
2. NOT BEING ABLE TO STOP OR CONTROL WORRYING: MORE THAN HALF THE DAYS
GAD7 TOTAL SCORE: 12
6. BECOMING EASILY ANNOYED OR IRRITABLE: SEVERAL DAYS
4. TROUBLE RELAXING: MORE THAN HALF THE DAYS
IF YOU CHECKED OFF ANY PROBLEMS ON THIS QUESTIONNAIRE, HOW DIFFICULT HAVE THESE PROBLEMS MADE IT FOR YOU TO DO YOUR WORK, TAKE CARE OF THINGS AT HOME, OR GET ALONG WITH OTHER PEOPLE: VERY DIFFICULT
1. FEELING NERVOUS, ANXIOUS, OR ON EDGE: MORE THAN HALF THE DAYS
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
## 2024-06-27 ENCOUNTER — DOCUMENTATION (OUTPATIENT)
Dept: BEHAVIORAL HEALTH | Facility: CLINIC | Age: 64
End: 2024-06-27
Payer: COMMERCIAL

## 2024-06-27 ENCOUNTER — DOCUMENTATION (OUTPATIENT)
Dept: PRIMARY CARE | Facility: CLINIC | Age: 64
End: 2024-06-27
Payer: COMMERCIAL

## 2024-06-27 DIAGNOSIS — F43.10 PTSD (POST-TRAUMATIC STRESS DISORDER): Primary | ICD-10-CM

## 2024-06-27 NOTE — PROGRESS NOTES
[facility] Psychiatry Consult Note     Amee You is a 63 y.o., referred to Collaborative Care for symptoms of depression and anxiety. I have reviewed the patient with the behavioral health manager and reviewed the patient's electronic record.    Recommendations:   [facility] will conduct therapy for coping with PTSD: relaxation, grounding, emotion management, self-compassion, bridge to trauma therapy if desired, MI for smoking cessation.  Please follow medication algorithm here.  1.  Seems to be responding partially to Lexapro and Wellbutrin - no changes recommended.  3. Consider augmentation with Topiramate.   4. If no improvement with topiramate or not tolerated, taper and replace with Quetiapine.    Topiramate  =========  DOSING (Immediate Release form):  Starting dose: 25 mg twice daily  Increase rate: 50 mg per week  Target dose: 200-400 mg per day  DISCONTINUATION: gradual taper, but discontinuation symptoms uncommon  USES: Seizure disorder, migraines, cluster headaches, binge eating disorder, alcohol use disorder, PTSD  MECHANISM: Blocks voltage-sensitive sodium channels  COMMON SIDE EFFECTS: sedation, asthenia, dizziness, trouble concentrating, ataxia, parasthesia, nervousness, nystagmus, tremor, nausea, loss of appetite. Common side effects are usually temporary.  RARE/DANGEROUS SIDE EFFECTS: metabolic acidosis, kidney stones, suicidal ideation  DRUG INTERACTIONS: Depakote, Phenytoin, Carbamazepine, oral contraceptives.  ALCOHOL: Do not use within 6 hours before or after drinking alcohol.  PREGNANCY/LACTATION: Increased risk of cleft lip/palate and hypospadia. Transmitted in breast milk. Typically recommend discontinuation, but important to weigh risks and benefits.    Quetiapine (Seroquel)  ==================  DOSING INFORMATION:   Assess baseline weight, waist circumference, blood pressure, fasting plasma glucose, fasting lipid profile, CBC (for baseline WBC), EKG (to assess QTc) and AIMS test.   Initiation for  depression augmentation or anxiety monotherapy: Start with 25 mg at bedtime and increase by up to 50 mg weekly to a target dose of  mg per day, in divided doses.  ONGOING MONITORING:   EKG at target dose (at least once to assess QTc). At 4 weeks: Weight, Fasting lipid profile.   At 8 weeks: weight.   At 12 weeks: weight, blood pressure, fasting plasma glucose, fasting lipid profile.   Quarterly thereafter: weight.   Annually /ongoing: Waist circumference, weight, blood pressure, fasting plasma glucose, fasting lipid profile and AIMS test. Consider checking for cataracts.  GENERAL INFORMATION: Atypical antipsychotic.   FDA Indications: Schizophrenia (IR, XR), Bipolar I - manic (IR, XR), Bipolar I - mixed (XR), Bipolar disorder - depressive episode (IR, XR), Bipolar maintenance as adjunctive to lithium or divalproex (IR, XR), Adjunctive treatment of MDD (XR).   Off-Label Indications: Anxiety disorders augmentation.   Pharmacokinetics: T½ = 6 hr (IR); 7 hrs (XR).   Common Side Effects (Schizophrenia and Bipolar Aaliyah-Seroquel IR): Headache (21%), somnolence (18%), dizziness (11%), dry mouth (9%), constipation (8%), weight gain (5%), dyspepsia (5%), ALT increased (5%).   Common Side Effects (Bipolar Depression-Seroquel XR): Somnolence (52%), dry mouth (37%), Increased appetite (12%), dyspepsia (7%), weight gain (7%), fatigue (6%). Black Box Warnings: (1) Increased mortality in elderly patients with dementia related psychosis. (2) Increased risk of suicidal thinking and behavior in children, adolescents, and young adults taking antidepressants. (3) Monitor for worsening and emergence of suicidal thoughts and behaviors.       Chronic PTSD from childhood traumas, now worsening while increasingly overwhelmed with work stress.  Also has alcohol, cocaine, and opiate use disorders in sustained remission.    Medications:  Wellbutrin  mg daily  Lexapro 20 mg daily - helpful but not as much as Effexor    Effexor -  helpful but insurance didn't cover  Trazodone - not sure why it was stopped    Patient Health Questionnaire-9 Score: 11 (6/26/2024  2:00 PM)  PRASANNA-7 Total Score: 12 (6/26/2024  2:00 PM)      The above treatment considerations and suggestions are based on consultations with the patient's care manager and a review of information available in the electronic medical record. I have not personally examined the patient. All recommendations should be implemented with consideration of the patient's relevant prior history and current clinical status. Please feel free to call me with any questions about the care of this patient. I can easily be reached through [employer].

## 2024-07-01 NOTE — PROGRESS NOTES
No chief complaint on file.      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 She has been treated by Dr. Enriquez in the past with botox injections. she has not noticed any new lesions or changes.     HIV status: negative  Smoking status: current smoker    Previous High Resolution Anoscopies and Cytologies have shown:     Colonoscopy 8/25/21 ([facility] [place])- Complete to ileocecal valve. No polyps or other abnormalities seen.   8/2021 HRA: LSIL & HSIL   10/2021 HRA: Focally invasive moderately differentiated scc arising from a background of HSIL   3/2023 HRA: HSIL     Past Medical History  She  Past Medical History:   Diagnosis Date    Arthritis        Surgical History  She  Past Surgical History:   Procedure Laterality Date    OTHER SURGICAL HISTORY  07/01/2021    Vascular bypass        Social History  She reports that she has been smoking cigarettes. She has a 15 pack-year smoking history. She has never used smokeless tobacco. She reports that she does not drink alcohol and does not use drugs.    Family History  Family History   Problem Relation Name Age of Onset    Ovarian cancer Mother      Breast cancer Paternal Grandmother          Allergies  Pregabalin, Sulfa (sulfonamide antibiotics), and Sulfamethoxazole    Home Medications  Prior to Admission medications    Medication Sig Start Date End Date Taking? Authorizing Provider   acetaminophen (Tylenol) 325 mg tablet TAKE 2 TABLETS BY MOUTH EVERY 6 HOURS FOR PAIN 3/10/23  Yes Historical Provider, MD   aspirin 81 mg EC tablet Take 1 tablet (81 mg) by mouth once daily. 6/29/21  Yes Historical Provider, MD   atorvastatin (Lipitor) 40 mg tablet Take 1 tablet (40 mg) by mouth once daily at bedtime.   Yes Historical Provider, MD   bacitracin 500 unit/gram ointment APPLY TOPICALLY TO AFFECTED AREA ONCE A DAY TO PREVENT ADHERENCE TO GAUZE DRESSINGS 3/10/23  Yes Historical Provider, MD    buPROPion SR (Wellbutrin SR) 150 mg 12 hr tablet TAKE 1 TABLET BY MOUTH DAILY FOR 3 DAYS THEN INCREASE TO 1 TABLET TWICE DAILY 1/3/24  Yes Dari Negro MD   carvedilol (Coreg) 6.25 mg tablet Take 2 tablets (12.5 mg) by mouth 2 times a day. 12/9/19  Yes Historical Provider, MD   furosemide (Lasix) 40 mg tablet Take 1 tablet (40 mg) by mouth once daily. 10/16/19  Yes Historical Provider, MD   hydrOXYzine HCL (Atarax) 10 mg tablet Take by mouth.   Yes Historical Provider, MD   ibuprofen 800 mg tablet Take by mouth. 5/6/13  Yes Historical Provider, MD   oxyCODONE (Oxy-IR) 5 mg immediate release capsule TAKE 1 CAPSULE BY MOUTH EVERY 6 TO 8 HOURS AS NEEDED FOR PAIN 3/10/23  Yes Historical Provider, MD   polyethylene glycol (Glycolax) 17 gram/dose powder MIX AND DRINK 17 GRAMS BY MOUTH ONCE DAILY TO PREVENT CONSTIPATION 3/10/23  Yes Historical Provider, MD   ramipril (Altace) 5 mg capsule Take 2 capsules (10 mg) by mouth once daily.   Yes Historical Provider, MD   venlafaxine 225 mg 24 hr tablet Take 1 tablet (225 mg) by mouth once daily.   Yes Historical Provider, MD   benzonatate (Tessalon) 100 mg capsule Take by mouth. 1/23/14   Historical Provider, MD   escitalopram (Lexapro) 20 mg tablet Take 1 tablet (20 mg) by mouth once daily. 2/9/23   Historical Provider, MD   lidocaine (Xylocaine) 5 % cream cream USE AS DIRECTED. 2/2/23   Historical Provider, MD   lidocaine-hydrocortisone-aloe 3-2.5 % (7 gram) kit Insert into the rectum. 7/16/21   Historical Provider, MD       Review of Systems     Physical Exam    Perineal/UMAIR:  Perineum: well healed, some scarring  UMAIR: normal  Tone: normal    Procedures    Last Recorded Vitals  There were no vitals taken for this visit.      Assessment and Plan  63 y.o. female here for follow up of anal dysplasia.

## 2024-07-02 ENCOUNTER — APPOINTMENT (OUTPATIENT)
Dept: SURGERY | Facility: CLINIC | Age: 64
End: 2024-07-02
Payer: COMMERCIAL

## 2024-07-08 ENCOUNTER — APPOINTMENT (OUTPATIENT)
Dept: RADIOLOGY | Facility: CLINIC | Age: 64
End: 2024-07-08
Payer: COMMERCIAL

## 2024-07-10 ENCOUNTER — APPOINTMENT (OUTPATIENT)
Dept: PRIMARY CARE | Facility: CLINIC | Age: 64
End: 2024-07-10
Payer: COMMERCIAL

## 2024-07-16 ENCOUNTER — TELEPHONE (OUTPATIENT)
Dept: PRIMARY CARE | Facility: CLINIC | Age: 64
End: 2024-07-16
Payer: COMMERCIAL

## 2024-07-16 NOTE — TELEPHONE ENCOUNTER
Amee took home urine test results showing large amount of Leukocytes   Amee states she left voicemail earlier today   Please advise

## 2024-07-17 ENCOUNTER — LAB REQUISITION (OUTPATIENT)
Dept: LAB | Facility: HOSPITAL | Age: 64
End: 2024-07-17
Payer: COMMERCIAL

## 2024-07-17 DIAGNOSIS — N39.0 URINARY TRACT INFECTION, SITE NOT SPECIFIED: ICD-10-CM

## 2024-07-17 PROCEDURE — 87086 URINE CULTURE/COLONY COUNT: CPT

## 2024-07-18 ENCOUNTER — APPOINTMENT (OUTPATIENT)
Dept: PRIMARY CARE | Facility: CLINIC | Age: 64
End: 2024-07-18
Payer: COMMERCIAL

## 2024-07-19 LAB — BACTERIA UR CULT: NORMAL

## 2024-07-23 ENCOUNTER — TELEPHONE (OUTPATIENT)
Dept: PRIMARY CARE | Facility: CLINIC | Age: 64
End: 2024-07-23
Payer: COMMERCIAL

## 2024-07-24 ENCOUNTER — APPOINTMENT (OUTPATIENT)
Dept: RADIOLOGY | Facility: CLINIC | Age: 64
End: 2024-07-24
Payer: COMMERCIAL

## 2024-07-24 ENCOUNTER — APPOINTMENT (OUTPATIENT)
Dept: PRIMARY CARE | Facility: CLINIC | Age: 64
End: 2024-07-24
Payer: COMMERCIAL
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
## 2024-07-26 DIAGNOSIS — K21.9 GASTROESOPHAGEAL REFLUX DISEASE, UNSPECIFIED WHETHER ESOPHAGITIS PRESENT: ICD-10-CM

## 2024-07-26 RX ORDER — PANTOPRAZOLE SODIUM 40 MG/1
TABLET, DELAYED RELEASE ORAL
Qty: 30 TABLET | Refills: 1 | Status: SHIPPED | OUTPATIENT
Start: 2024-07-26

## 2024-07-27 DIAGNOSIS — I10 HTN (HYPERTENSION), BENIGN: ICD-10-CM

## 2024-07-29 ENCOUNTER — DOCUMENTATION (OUTPATIENT)
Dept: PRIMARY CARE | Facility: CLINIC | Age: 64
End: 2024-07-29
Payer: COMMERCIAL

## 2024-07-29 DIAGNOSIS — F43.10 PTSD (POST-TRAUMATIC STRESS DISORDER): Primary | ICD-10-CM

## 2024-07-29 PROCEDURE — 99493 SBSQ PSYC COLLAB CARE MGMT: CPT | Performed by: FAMILY MEDICINE

## 2024-07-29 PROCEDURE — 99494 1ST/SBSQ PSYC COLLAB CARE: CPT | Performed by: FAMILY MEDICINE

## 2024-07-29 RX ORDER — RAMIPRIL 10 MG/1
CAPSULE ORAL
Qty: 90 CAPSULE | Refills: 1 | Status: SHIPPED | OUTPATIENT
Start: 2024-07-29

## 2024-08-07 ENCOUNTER — APPOINTMENT (OUTPATIENT)
Dept: PRIMARY CARE | Facility: CLINIC | Age: 64
End: 2024-08-07
Payer: COMMERCIAL

## 2024-08-19 ENCOUNTER — APPOINTMENT (OUTPATIENT)
Dept: GYNECOLOGIC ONCOLOGY | Facility: HOSPITAL | Age: 64
End: 2024-08-19
Payer: COMMERCIAL

## 2024-08-19 RX ORDER — MELOXICAM 15 MG/1
TABLET ORAL
COMMUNITY
Start: 2024-02-13

## 2024-08-19 RX ORDER — FLUOXETINE HYDROCHLORIDE 20 MG/1
20 CAPSULE ORAL DAILY
COMMUNITY
Start: 2024-02-14 | End: 2024-08-21

## 2024-08-21 ENCOUNTER — OFFICE VISIT (OUTPATIENT)
Dept: GASTROENTEROLOGY | Facility: CLINIC | Age: 64
End: 2024-08-21
Payer: COMMERCIAL

## 2024-08-21 ENCOUNTER — APPOINTMENT (OUTPATIENT)
Dept: PRIMARY CARE | Facility: CLINIC | Age: 64
End: 2024-08-21
Payer: COMMERCIAL

## 2024-08-21 ENCOUNTER — LAB (OUTPATIENT)
Dept: LAB | Facility: LAB | Age: 64
End: 2024-08-21
Payer: COMMERCIAL

## 2024-08-21 VITALS
OXYGEN SATURATION: 94 % | RESPIRATION RATE: 20 BRPM | BODY MASS INDEX: 28.83 KG/M2 | DIASTOLIC BLOOD PRESSURE: 71 MMHG | HEART RATE: 62 BPM | WEIGHT: 143 LBS | TEMPERATURE: 98.1 F | HEIGHT: 59 IN | SYSTOLIC BLOOD PRESSURE: 105 MMHG

## 2024-08-21 DIAGNOSIS — K52.9 CHRONIC DIARRHEA: Primary | ICD-10-CM

## 2024-08-21 DIAGNOSIS — Z72.0 TOBACCO USE: ICD-10-CM

## 2024-08-21 DIAGNOSIS — R10.32 LEFT GROIN PAIN: ICD-10-CM

## 2024-08-21 DIAGNOSIS — K92.1 HEMATOCHEZIA: ICD-10-CM

## 2024-08-21 DIAGNOSIS — R13.19 ESOPHAGEAL DYSPHAGIA: ICD-10-CM

## 2024-08-21 DIAGNOSIS — K21.9 GASTROESOPHAGEAL REFLUX DISEASE, UNSPECIFIED WHETHER ESOPHAGITIS PRESENT: ICD-10-CM

## 2024-08-21 DIAGNOSIS — R14.0 BLOATING: ICD-10-CM

## 2024-08-21 DIAGNOSIS — R19.5 STOOL MUCUS: ICD-10-CM

## 2024-08-21 DIAGNOSIS — R63.0 POOR APPETITE: ICD-10-CM

## 2024-08-21 DIAGNOSIS — R14.0 ABDOMINAL BLOATING: ICD-10-CM

## 2024-08-21 DIAGNOSIS — R68.81 EARLY SATIETY: ICD-10-CM

## 2024-08-21 DIAGNOSIS — K52.9 CHRONIC DIARRHEA: ICD-10-CM

## 2024-08-21 DIAGNOSIS — R14.0 ABDOMINAL DISTENSION: ICD-10-CM

## 2024-08-21 LAB
GLIADIN PEPTIDE IGA SER IA-ACNC: 78.8 U/ML
TTG IGA SER IA-ACNC: <1 U/ML

## 2024-08-21 PROCEDURE — 83516 IMMUNOASSAY NONANTIBODY: CPT

## 2024-08-21 PROCEDURE — 3008F BODY MASS INDEX DOCD: CPT | Performed by: NURSE PRACTITIONER

## 2024-08-21 PROCEDURE — 36415 COLL VENOUS BLD VENIPUNCTURE: CPT

## 2024-08-21 PROCEDURE — 99204 OFFICE O/P NEW MOD 45 MIN: CPT | Performed by: NURSE PRACTITIONER

## 2024-08-21 PROCEDURE — 99214 OFFICE O/P EST MOD 30 MIN: CPT | Performed by: NURSE PRACTITIONER

## 2024-08-21 RX ORDER — POLYETHYLENE GLYCOL 3350 17 G/17G
238 POWDER, FOR SOLUTION ORAL ONCE
Qty: 238 G | Refills: 0 | Status: SHIPPED | OUTPATIENT
Start: 2024-08-21 | End: 2024-08-21

## 2024-08-21 ASSESSMENT — ENCOUNTER SYMPTOMS
ADENOPATHY: 0
NERVOUS/ANXIOUS: 0
WEAKNESS: 0
ARTHRALGIAS: 0
FEVER: 0
MYALGIAS: 0
JOINT SWELLING: 0
FATIGUE: 0
LIGHT-HEADEDNESS: 0
NUMBNESS: 0
HALLUCINATIONS: 0
FREQUENCY: 0
CHILLS: 0
DIZZINESS: 0
WHEEZING: 0
SHORTNESS OF BREATH: 0
DIAPHORESIS: 0
HEMATURIA: 0
COUGH: 0
PALPITATIONS: 0
DYSURIA: 0
EYE PAIN: 0
AGITATION: 0
BACK PAIN: 0
PHOTOPHOBIA: 0
FLANK PAIN: 0
SORE THROAT: 0
HEADACHES: 0

## 2024-08-21 ASSESSMENT — PAIN SCALES - GENERAL: PAINLEVEL: 0-NO PAIN

## 2024-08-21 NOTE — PATIENT INSTRUCTIONS
Thanks for coming to the GI clinic.     I would like you to get an EGD.     I would like you to get a colonoscopy.   Please read all of the instructions 7 days before your colonoscopy.   You will need to take ONLY clear liquids the ENTIRE day before your procedure. These include (clear fruit juices, soda, Gatorade, broth, jello and coffee/tea) Avoid red and purple drinks. No cream or milk in the coffee.   You will need to take a bowel preparation.   You will also need a .      Please call [phone] to schedule the above procedures.     Please complete blood work and stool studies prior to the above procedures.     Try to stop using tobacco.     Follow up in clinic 3 weeks after completion of testing.
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
## 2024-08-21 NOTE — PROGRESS NOTES
Subjective   Patient ID: Amee You is a 64 y.o. female who presents for diarrhea.     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     History per pt and extensive review of EMR including OSH records     Reports since 1/2024 she's been dealing with diarrhea, abdominal bloating, abdominal distension, and poor appetite. She has diarrhea after eating and diarrhea which wakes her from sleep at night. She has mushy to watery diarrhea 4-5 times a day. She has a soft stool (Haralson type 4)  twice a week, but most of her stools are loose.     Recently went on a cruise to the [facility], but diarrhea started prior to this.     ROS positive for significant early satiety.     ROS positive for mucus per rectum.     Reports for the past 3 weeks she's been having left groin pain.     Reports developing heartburn 3 months ago which resolved once she began taking pantoprazole 40 mg/day. States pantoprazole has somewhat improved her abdominal bloating as well.     ROS positive for esophageal dysphagia to foods which has improved with pantoprazole.     Reports having an episode of BRBPR with defecation this morning. She saw blood on the toilet paper when wiping.     Denies unintentional weight loss, abdominal pain/discomfort, vomiting, constipation, hematemesis, and melena.    Stopped meloxicam  out of concern for abdominal bloating and diarrhea.     Diet includes gluten.     Colonoscopy 8/25/21 Dr. Santoro - Complete to ileocecal valve. No polyps or other abnormalities seen; no specimens collected    She never had an EGD.     Past medical history:  See above     Past surgical history:   See above   Multiple anal condyloma excisions    Tubal ligation (1994)   Right total knee arthroplasty (2015 )   Thoracic  outlet syndrome s/p left carotid artery to subclavian artery bypass (11/2012 [facility])     Family history:  No GI cancers, IBD, or celiac disease.     Social history:   Smokes 1 PPD of cigarettes; has smoked for 30 years   Denies use of alcohol and illicit drugs      Lives in [place], [place]        Review of Systems   Constitutional:  Negative for chills, diaphoresis, fatigue and fever.   HENT:  Negative for congestion, ear pain, hearing loss, sneezing and sore throat.    Eyes:  Negative for photophobia, pain and visual disturbance.   Respiratory:  Negative for cough, shortness of breath and wheezing.    Cardiovascular:  Negative for chest pain, palpitations and leg swelling.   Endocrine: Negative for cold intolerance and heat intolerance.   Genitourinary:  Negative for dysuria, flank pain, frequency and hematuria.   Musculoskeletal:  Negative for arthralgias, back pain, gait problem, joint swelling and myalgias.   Skin:  Negative for rash.   Neurological:  Negative for dizziness, syncope, weakness, light-headedness, numbness and headaches.   Hematological:  Negative for adenopathy.   Psychiatric/Behavioral:  Negative for agitation and hallucinations. The patient is not nervous/anxious.        Allergies   Allergen Reactions    Pregabalin Rash     open skin leisions    Sulfa (Sulfonamide Antibiotics) Rash    Sulfamethoxazole Fever, Rash and Swelling     Current Outpatient Medications   Medication Sig Dispense Refill    acetaminophen (Tylenol) 325 mg tablet if needed.      aspirin 81 mg EC tablet Take 1 tablet (81 mg) by mouth once daily.      atorvastatin (Lipitor) 40 mg tablet Take 1 tablet (40 mg) by mouth once daily at bedtime.      buPROPion XL (Wellbutrin XL) 300 mg 24 hr tablet Take 1 tablet (300 mg) by mouth once daily in the morning. Do not crush, chew, or split. 90 tablet 1    carvedilol (Coreg) 6.25 mg tablet Take 2 tablets (12.5 mg) by mouth 2 times a day. 360 tablet 1    escitalopram (Lexapro) 20 mg tablet  "Take 1 tablet (20 mg) by mouth once daily. 90 tablet 1    furosemide (Lasix) 40 mg tablet Take 1 tablet (40 mg) by mouth if needed (swelling and \"water retention\").      hydrOXYzine HCL (Atarax) 10 mg tablet Take by mouth if needed for itching.      lidocaine-hydrocortisone-aloe 3-2.5 % (7 gram) kit Insert into the rectum. Takes as needed after anal surgeries      pantoprazole (ProtoNix) 40 mg EC tablet TAKE 1 TABLET(40 MG) BY MOUTH DAILY. DO NOT CRUSH, CHEW, OR SPLIT 30 tablet 1    ramipril (Altace) 10 mg capsule TAKE 1 CAPSULE(10 MG) BY MOUTH DAILY 90 capsule 1    meloxicam (Mobic) 15 mg tablet       polyethylene glycol (Glycolax, Miralax) 17 gram/dose powder Take 238 g by mouth 1 time for 1 dose. Use as directed by  endoscopy department for colonoscopy 238 g 0     No current facility-administered medications for this visit.        Objective     /71 (BP Location: Right arm, Patient Position: Sitting, BP Cuff Size: Adult)   Pulse 62   Temp 36.7 °C (98.1 °F)   Resp 20   Ht 1.499 m (4' 11\")   Wt 64.9 kg (143 lb)   SpO2 94%   BMI 28.88 kg/m²     Physical Exam  Constitutional:       General: She is not in acute distress.  HENT:      Head: Normocephalic and atraumatic.   Eyes:      Conjunctiva/sclera: Conjunctivae normal.   Cardiovascular:      Rate and Rhythm: Normal rate and regular rhythm.      Heart sounds: No murmur heard.     No gallop.   Pulmonary:      Effort: Pulmonary effort is normal.      Breath sounds: Normal breath sounds.   Abdominal:      General: Bowel sounds are normal. There is no distension.      Tenderness: There is no abdominal tenderness. There is no guarding.   Musculoskeletal:         General: No swelling or deformity. Normal range of motion.      Cervical back: Normal range of motion. No rigidity.   Skin:     General: Skin is warm and dry.      Coloration: Skin is not jaundiced.      Findings: No lesion or rash.   Neurological:      General: No focal deficit present.      Mental " Status: She is alert and oriented to person, place, and time.   Psychiatric:         Mood and Affect: Mood normal.         Assessment/Plan   Problem List Items Addressed This Visit       Gastroesophageal reflux disease     Other Visit Diagnoses       Chronic diarrhea    -  Primary    Relevant Medications    polyethylene glycol (Glycolax, Miralax) 17 gram/dose powder    Other Relevant Orders    Colonoscopy Diagnostic    C. difficile, PCR    Stool Pathogen Panel, PCR    Ova/Para + Giardia/Cryptosporidium Antigen    Celiac Panel    Bloating        Abdominal bloating        Abdominal distension        Early satiety        Relevant Orders    Esophagogastroduodenoscopy (EGD)    Esophageal dysphagia        Relevant Orders    Esophagogastroduodenoscopy (EGD)    Hematochezia        Relevant Medications    polyethylene glycol (Glycolax, Miralax) 17 gram/dose powder    Other Relevant Orders    Colonoscopy Diagnostic    Stool mucus        Tobacco use        Left groin pain        Poor appetite               Chronic diarrhea, abdominal bloating, abdominal distension, mucus per rectum, isolated episode of hematochezia: etiology not entirely clear. Potentially multifactorial in nature. Consider infectious enteritis/colitis, IBD, microscopic colitis,  and celiac disease. Also consider hemorrhoids and colorectal cancer as sources of bleeding.   - will proceed with colonoscopy with random biopsies  - Miralax Gatorade split bowel prep for colonoscopy   - check Cdiff PCR, stool pathogen panel, stool O/P, celiac serologies    2. Early satiety, esophageal dysphagia, poor appetite: consider reflux esophagitis, peptic stricture, esophageal malignancy, and gastric malignancy  - will proceed with EGD    3. GERD: improved with pantoprazole 40 mg/day  - EGD as above  - recommend tobacco cessation     4. Tobacco use:   - recommend cessation as above    5. Left groin pain: do not believe this is GI related   - recommend PCP follow     6. Follow  up:  - return to clinic 3 weeks after completion of the above testing

## 2024-08-23 ENCOUNTER — LAB (OUTPATIENT)
Dept: LAB | Facility: LAB | Age: 64
End: 2024-08-23
Payer: COMMERCIAL

## 2024-08-23 DIAGNOSIS — K52.9 CHRONIC DIARRHEA: ICD-10-CM

## 2024-08-23 LAB
GLIADIN PEPTIDE IGG SER IA-ACNC: <0.56 FLU (ref 0–4.99)
TTG IGG SER IA-ACNC: <0.82 FLU (ref 0–4.99)

## 2024-08-23 PROCEDURE — 87493 C DIFF AMPLIFIED PROBE: CPT

## 2024-08-23 PROCEDURE — 87328 CRYPTOSPORIDIUM AG IA: CPT

## 2024-08-23 PROCEDURE — 87506 IADNA-DNA/RNA PROBE TQ 6-11: CPT

## 2024-08-23 PROCEDURE — 87329 GIARDIA AG IA: CPT

## 2024-08-24 LAB — C DIF TOX TCDA+TCDB STL QL NAA+PROBE: NOT DETECTED

## 2024-08-25 LAB

## 2024-08-26 ENCOUNTER — DOCUMENTATION (OUTPATIENT)
Dept: PRIMARY CARE | Facility: CLINIC | Age: 64
End: 2024-08-26
Payer: COMMERCIAL

## 2024-08-26 DIAGNOSIS — F43.10 PTSD (POST-TRAUMATIC STRESS DISORDER): Primary | ICD-10-CM

## 2024-08-26 PROCEDURE — 99493 SBSQ PSYC COLLAB CARE MGMT: CPT | Performed by: FAMILY MEDICINE

## 2024-08-27 DIAGNOSIS — E78.5 HYPERLIPIDEMIA, UNSPECIFIED HYPERLIPIDEMIA TYPE: Primary | ICD-10-CM

## 2024-08-27 LAB
CRYPTOSP AG STL QL IA: NEGATIVE
G LAMBLIA AG STL QL IA: NEGATIVE

## 2024-08-27 RX ORDER — ATORVASTATIN CALCIUM 40 MG/1
40 TABLET, FILM COATED ORAL NIGHTLY
Qty: 90 TABLET | Refills: 0 | Status: SHIPPED | OUTPATIENT
Start: 2024-08-27

## 2024-08-29 NOTE — PROGRESS NOTES
No chief complaint on file.      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 She has been treated by Dr. Enriquez in the past with botox injections. she has not noticed any new lesions or changes.     She is still smoking 1 ppd. She is having a colonoscopy and EGD in November for diarrhea and incontinence.     HIV status: negative  Smoking status: current smoker    Previous High Resolution Anoscopies and Cytologies have shown:     Colonoscopy 8/25/21 ([facility])- Complete to ileocecal valve. No polyps or other abnormalities seen.   8/2021 HRA: LSIL & HSIL   10/2021 HRA: Focally invasive moderately differentiated scc arising from a background of HSIL   3/2023 HRA: HSIL     Past Medical History  She  Past Medical History:   Diagnosis Date    Arthritis        Surgical History  She  Past Surgical History:   Procedure Laterality Date    OTHER SURGICAL HISTORY  07/01/2021    Vascular bypass        Social History  She reports that she has been smoking cigarettes. She has a 15 pack-year smoking history. She has never used smokeless tobacco. She reports that she does not drink alcohol and does not use drugs.    Family History  Family History   Problem Relation Name Age of Onset    Ovarian cancer Mother      Breast cancer Paternal Grandmother          Allergies  Pregabalin, Sulfa (sulfonamide antibiotics), and Sulfamethoxazole    Home Medications  Prior to Admission medications    Medication Sig Start Date End Date Taking? Authorizing Provider   acetaminophen (Tylenol) 325 mg tablet if needed. 3/10/23   Historical Provider, MD   aspirin 81 mg EC tablet Take 1 tablet (81 mg) by mouth once daily. 6/29/21   Historical Provider, MD   atorvastatin (Lipitor) 40 mg tablet Take 1 tablet (40 mg) by mouth once daily at bedtime. 8/27/24   Dari Negro MD   buPROPion XL (Wellbutrin XL) 300 mg 24 hr tablet Take 1 tablet (300 mg) by mouth once daily in  "the morning. Do not crush, chew, or split. 4/4/24   Dari Negro MD   carvedilol (Coreg) 6.25 mg tablet Take 2 tablets (12.5 mg) by mouth 2 times a day. 3/5/24   Dari Negro MD   escitalopram (Lexapro) 20 mg tablet Take 1 tablet (20 mg) by mouth once daily. 4/4/24   Dari Negro MD   furosemide (Lasix) 40 mg tablet Take 1 tablet (40 mg) by mouth if needed (swelling and \"water retention\"). 10/16/19   Historical Provider, MD   hydrOXYzine HCL (Atarax) 10 mg tablet Take by mouth if needed for itching.    Historical Provider, MD   lidocaine-hydrocortisone-aloe 3-2.5 % (7 gram) kit Insert into the rectum. Takes as needed after anal surgeries 7/16/21   Historical Provider, MD   meloxicam (Mobic) 15 mg tablet  2/13/24   Historical Provider, MD   pantoprazole (ProtoNix) 40 mg EC tablet TAKE 1 TABLET(40 MG) BY MOUTH DAILY. DO NOT CRUSH, CHEW, OR SPLIT 7/26/24   Dari Negro MD   ramipril (Altace) 10 mg capsule TAKE 1 CAPSULE(10 MG) BY MOUTH DAILY 7/29/24   Dari Negro MD   atorvastatin (Lipitor) 40 mg tablet Take 1 tablet (40 mg) by mouth once daily at bedtime.  8/27/24  Historical Provider, MD       Review of Systems     Physical Exam    Perineal/UMAIR:  Perineum: small recurrent lesions anteriorly outside the area of previous excision  UMAIR: WNL  Tone: WNL     Anoscopy    Date/Time: 9/3/2024 1:50 PM    Performed by: Josie Lepe MD  Authorized by: Josie Lepe MD    Consent:     Consent obtained:  Verbal    Consent given by:  Patient    Risks, benefits, and alternatives were discussed: yes      Risks discussed:  Bleeding and pain    Alternatives discussed:  No treatment  Universal protocol:     Procedure explained and questions answered to patient or proxy's satisfaction: yes      Patient identity confirmed:  Verbally with patient  Indications:     Indications comment:  Anal dysplasia  Post-procedure details:     Procedure completion:  Tolerated well, no immediate complications      Last Recorded " Vitals  There were no vitals taken for this visit.      Assessment and Plan  64 y.o. female here for follow up of anal dysplasia.     I think she needs re-excision of abnormal areas. She is seeing Dr Vanegas next week - depending on her findings, we will schedule either for my part alone or in conjunction.

## 2024-09-01 DIAGNOSIS — I42.9 CARDIOMYOPATHY, UNSPECIFIED TYPE (MULTI): ICD-10-CM

## 2024-09-01 LAB — O+P STL MICRO: NEGATIVE

## 2024-09-03 ENCOUNTER — OFFICE VISIT (OUTPATIENT)
Dept: SURGERY | Facility: CLINIC | Age: 64
End: 2024-09-03
Payer: COMMERCIAL

## 2024-09-03 VITALS — WEIGHT: 136 LBS | BODY MASS INDEX: 27.47 KG/M2

## 2024-09-03 DIAGNOSIS — K62.82 ANAL DYSPLASIA: Primary | ICD-10-CM

## 2024-09-03 PROCEDURE — 46600 DIAGNOSTIC ANOSCOPY SPX: CPT | Performed by: SURGERY

## 2024-09-03 PROCEDURE — 99213 OFFICE O/P EST LOW 20 MIN: CPT | Performed by: SURGERY

## 2024-09-03 RX ORDER — CARVEDILOL 6.25 MG/1
TABLET ORAL
Qty: 360 TABLET | Refills: 1 | Status: SHIPPED | OUTPATIENT
Start: 2024-09-03

## 2024-09-04 ENCOUNTER — APPOINTMENT (OUTPATIENT)
Dept: PRIMARY CARE | Facility: CLINIC | Age: 64
End: 2024-09-04
Payer: COMMERCIAL

## 2024-09-04 DIAGNOSIS — F32.A DEPRESSION, UNSPECIFIED DEPRESSION TYPE: ICD-10-CM

## 2024-09-04 RX ORDER — ESCITALOPRAM OXALATE 20 MG/1
20 TABLET ORAL DAILY
Qty: 90 TABLET | Refills: 1 | Status: SHIPPED | OUTPATIENT
Start: 2024-09-04

## 2024-09-04 NOTE — PROGRESS NOTES
"[facility] ([facility])  Progress Note    Type of Interaction: In Office    Start Time: 12:30p    End Time: 1:30p      Appointment: Scheduled    Reason for Visit:   Chief Complaint   Patient presents with    PTSD (Post-Traumatic Stress Disorder)          Interval History / Patient Symptoms: Patient reports she is the midst of losing one of her dogs. Patient reports she is \"not allowed\" to see any of grand kids. Patient does not feel supported by her spouse. Patient calls herself a survivor and she is working on accepting the chronic illness. Patient states she always felt she supported her spouse when he was ill and he does not support her along the way.  Patient states it is difficult for her to think about not smoking. Patient reports. Patient reports she would like to stop smoking as it contributes to her medical illnesses. Patient is learning to walk away and not engage in conflict with her spouse. Patient feels like with her spouse she is fighting a losing peck with the employees at work. Patient recognizes that she has sacraficed her entire life she had all these 'labels'. Patient reports all of her life she allowed others to define who she was. Patient states the lack of affection from people is difficult for her. Patient states she craves it and would love to have it back. Patient reports when she attended AA meetings. Patient is going to take time out for herself. Patient booked another cruise for April 2025.     No data recorded      Interventions Provided: [place] Setting, Behavioral Activation, Develop Coping Strategies, and Review Progress/Goals Stress Management      Progress Made: Mixed    Response to Intervention: Patient is open and engaging. Patient processes and reflects on her current choices and life circumstances. Patient is going to start speaking up for herself and doing things for herself.                     Follow Up / Next Appointment:      9/18/2024@12:30p  "

## 2024-09-09 ENCOUNTER — OFFICE VISIT (OUTPATIENT)
Dept: GYNECOLOGIC ONCOLOGY | Facility: HOSPITAL | Age: 64
End: 2024-09-09
Payer: COMMERCIAL

## 2024-09-09 VITALS
SYSTOLIC BLOOD PRESSURE: 124 MMHG | TEMPERATURE: 97.3 F | DIASTOLIC BLOOD PRESSURE: 82 MMHG | HEART RATE: 69 BPM | RESPIRATION RATE: 18 BRPM | OXYGEN SATURATION: 94 % | BODY MASS INDEX: 28.68 KG/M2 | WEIGHT: 141.98 LBS

## 2024-09-09 DIAGNOSIS — K62.82 ANAL DYSPLASIA: ICD-10-CM

## 2024-09-09 DIAGNOSIS — N90.3 VULVAR DYSPLASIA: Primary | ICD-10-CM

## 2024-09-09 DIAGNOSIS — F17.200 TOBACCO USE DISORDER: ICD-10-CM

## 2024-09-09 PROCEDURE — 99215 OFFICE O/P EST HI 40 MIN: CPT | Performed by: STUDENT IN AN ORGANIZED HEALTH CARE EDUCATION/TRAINING PROGRAM

## 2024-09-09 PROCEDURE — 99215 OFFICE O/P EST HI 40 MIN: CPT | Mod: 25 | Performed by: STUDENT IN AN ORGANIZED HEALTH CARE EDUCATION/TRAINING PROGRAM

## 2024-09-09 PROCEDURE — 99406 BEHAV CHNG SMOKING 3-10 MIN: CPT | Performed by: STUDENT IN AN ORGANIZED HEALTH CARE EDUCATION/TRAINING PROGRAM

## 2024-09-09 PROCEDURE — 4004F PT TOBACCO SCREEN RCVD TLK: CPT | Performed by: STUDENT IN AN ORGANIZED HEALTH CARE EDUCATION/TRAINING PROGRAM

## 2024-09-09 RX ORDER — SODIUM CHLORIDE, SODIUM LACTATE, POTASSIUM CHLORIDE, CALCIUM CHLORIDE 600; 310; 30; 20 MG/100ML; MG/100ML; MG/100ML; MG/100ML
20 INJECTION, SOLUTION INTRAVENOUS CONTINUOUS
OUTPATIENT
Start: 2024-09-09

## 2024-09-09 RX ORDER — ACETAMINOPHEN 325 MG/1
975 TABLET ORAL ONCE
OUTPATIENT
Start: 2024-09-09 | End: 2024-09-09

## 2024-09-09 RX ORDER — GABAPENTIN 600 MG/1
600 TABLET ORAL ONCE
OUTPATIENT
Start: 2024-09-09 | End: 2024-09-09

## 2024-09-09 RX ORDER — CELECOXIB 400 MG/1
400 CAPSULE ORAL ONCE
OUTPATIENT
Start: 2024-09-09 | End: 2024-09-09

## 2024-09-09 ASSESSMENT — PAIN SCALES - GENERAL: PAINLEVEL: 0-NO PAIN

## 2024-09-09 NOTE — PROGRESS NOTES
Patient ID: Amee You is a 64 y.o. female.  Referring Physician: No referring provider defined for this encounter.  Primary Care Provider: Dari Negro MD    Subjective    65yo with history of VIN3; additionally with moderately differentiated SCC of the anus, persistent anal dysplasia for surgical planning. Patient presented to Dr Santoro for surveillance r/t anal dysplasia and noted to have vulvar lesion as well. Patient has not been seen by myself since 4/2023.  Reports occasional pain with urination, recently put on antibiotics by Urgent Care with minimal improvement in pain. Progressively worsening discomfort over past month without fevers/chills or flank pain. No associated itching but reports area feels irritated. No bleeding or abnormal vaginal discharge. Denies nausea/vomiting, fevers, chills, chest pain or shortness of breath. Occasional left groin pain as well as chronic lower back pain for which she is receiving steroid injections with some improvement. Continues to smoke 1PPD. No other concerns; denies recent weight changes.     A comprehensive review of systems was performed and otherwise negative.       Objective    BSA: 1.64 meters squared  /82   Pulse 69   Temp 36.3 °C (97.3 °F)   Resp 18   Wt 64.4 kg (141 lb 15.6 oz)   SpO2 94%   BMI 28.68 kg/m²      Physical Exam    Performance Status:  {ECOG performance status:21639}    Assessment/Plan     Oncology History    No history exists.        {Assess/PlanSmartLinks:21763}    Treatment Plans       No treatment plans exist

## 2024-09-10 NOTE — PROGRESS NOTES
Patient ID: Amee You is a 64 y.o. female.  Referring Physician: No referring provider defined for this encounter.  Primary Care Provider: Dari Negro MD    Subjective    63yo with history of VIN3; additionally with moderately differentiated SCC of the anus, persistent anal dysplasia for surgical planning. Patient presented to Dr Santoro for surveillance r/t anal dysplasia and noted to have vulvar lesion as well. Patient has not been seen by myself since 4/2023.  Reports occasional pain with urination, recently put on antibiotics by Urgent Care with minimal improvement in pain. Progressively worsening discomfort over past month without fevers/chills or flank pain. No associated itching but reports area feels irritated. No bleeding or abnormal vaginal discharge. Denies nausea/vomiting, fevers, chills, chest pain or shortness of breath. Occasional left groin pain as well as chronic lower back pain for which she is receiving steroid injections with some improvement. Continues to smoke 1PPD. No other concerns; denies recent weight changes.      A comprehensive review of systems was performed and otherwise negative.     Objective    BSA: 1.64 meters squared  /82   Pulse 69   Temp 36.3 °C (97.3 °F)   Resp 18   Wt 64.4 kg (141 lb 15.6 oz)   SpO2 94%   BMI 28.68 kg/m²      Physical Exam  Vitals and nursing note reviewed. Exam conducted with a chaperone present.   Constitutional:       General: She is not in acute distress.     Appearance: Normal appearance.   HENT:      Head: Normocephalic and atraumatic.      Mouth/Throat:      Mouth: Mucous membranes are moist.      Pharynx: No oropharyngeal exudate or posterior oropharyngeal erythema.   Eyes:      Extraocular Movements: Extraocular movements intact.      Conjunctiva/sclera: Conjunctivae normal.      Pupils: Pupils are equal, round, and reactive to light.   Neck:      Thyroid: No thyroid mass or thyromegaly.   Cardiovascular:      Rate and Rhythm:  Normal rate and regular rhythm.      Pulses: Normal pulses.      Heart sounds: Normal heart sounds.   Pulmonary:      Effort: Pulmonary effort is normal.      Breath sounds: Wheezing and rhonchi present. No rales.   Abdominal:      General: Bowel sounds are normal. There is no distension.      Palpations: Abdomen is soft. There is no mass.      Tenderness: There is no abdominal tenderness.      Hernia: No hernia is present.   Genitourinary:     Labia:         Right: Lesion present.         Left: Lesion present.       Vagina: Normal.          Comments: Grossly normal vaginal mucosa without lesions noted. Posterior aspect of cervix grossly visible but limited tolerance to examination (small speculum) due to tenderness of anterior vulvar lesion  Musculoskeletal:         General: No tenderness. Normal range of motion.      Cervical back: Normal range of motion and neck supple. No tenderness.      Right lower leg: No edema.      Left lower leg: No edema.   Lymphadenopathy:      Lower Body: No right inguinal adenopathy. No left inguinal adenopathy.   Skin:     General: Skin is warm.      Findings: No lesion or rash.   Neurological:      General: No focal deficit present.      Mental Status: She is alert and oriented to person, place, and time.   Psychiatric:         Mood and Affect: Mood normal.         Behavior: Behavior normal.       Performance Status:  Symptomatic; fully ambulatory    Assessment/Plan   63yo with recurrent anal dysplasia (history of focal moderately invasive SCC of anus), VIN3 with examination grossly suspicious for superior vulvar/periclitoral recurrence not amenable to in-office biopsy. Medical comorbidities: COPD with 1PPD tobacco use, cardiomyopathy with thoracic outlet syndrome. ECOG 1.     Oncology History    No history exists.     Diagnoses and all orders for this visit:  Vulvar dysplasia  - Recommend EUA with vulvar biopsies, WLE of midline periclitoral lesion and all indicated procedures. PAT  requested due to medical comorbidities. Patient was counseled re: examination findings concerning for potential invasive vulvar cancer in light of progressive symptoms and in event of invasive carcinoma, aware of potential need for subsequent surgical resection +/- lymph node evaluation as clinically indicated. Due to sensitive nature of primary lesion she is unable to tolerate biopsy in office today, and verbalizes understanding.   - She is aware of risk of bleeding, infection, scarring, thromboembolic complications, incisional complications and injury to adjacent organs and structures, including the urethra, blood vessels, sexual structures and nerves, need for additional procedures and risks inherent to anesthesia including possible death. Postoperative recovery was reviewed, including the risk of venous thrombosis, bowel adhesions, ileus and incisional hernia formation.   - The patient was given time to ask pertinent questions and would like to proceed with procedures as indicated. Other alternatives including nonsurgical options reviewed with patients. All questions were answered to patient's satisfaction.  - Surgical consents reviewed and signed in office.  [ ] PAT  [ ] OR date pending with Dr Santoro; tentatively 10/16  -     Case Request Operating Room: Vulvectomy Simple, Anoscopy, Biopsy Anus; Standing  -     Request for Pre-Admission Testing Visit; Future  Anal dysplasia  - Plan for concurrent resection/HRA with Dr Santoro  Tobacco use disorder  - Patient counseled re: increased risk of cardiovascular, pulmonary complications r/t anesthesia and adverse long-term health consequences of continued tobacco use as it relates to primary vulvar, anal dysplasias  - Feels unable to establish quit date at this time, declines pharmacologic resources. Approximately 6min spent in discussion and counseling   - Continue to address    Rbea Vanegas MD

## 2024-09-18 ENCOUNTER — SOCIAL WORK (OUTPATIENT)
Dept: PRIMARY CARE | Facility: CLINIC | Age: 64
End: 2024-09-18
Payer: COMMERCIAL

## 2024-09-18 ENCOUNTER — APPOINTMENT (OUTPATIENT)
Dept: PRIMARY CARE | Facility: CLINIC | Age: 64
End: 2024-09-18
Payer: COMMERCIAL

## 2024-09-18 NOTE — PROGRESS NOTES
[facility]  Progress Note    Type of Interaction: Phone    Start Time: 12:45p    End Time: 1:45p        Appointment: Scheduled    Reason for Visit:   Chief Complaint   Patient presents with    PTSD (Post-Traumatic Stress Disorder)          Interval History / Patient Symptoms: Patient reports she has not been feeling well. Patient is having a biopsy done in November 2024. Patient reports minimal support from her spouse when she brings it to his attention. Patient has been able to talk to her friends about how she is feeling. Patient states she is not sleeping well because she is so worried. Patient has been trying to do meditation and listen to nature sounds in order to attempt to relax and rest. Patient has some serious health issues currently that have been hard for her. Patient also lost her dog recently which was difficult. Patient is not feeling well and her pain exacerbates how she is doing mentally.  Patient and BHM discussed the symptoms of anxiety and what she is in control of and can manage. Patient and BHM discussed what anxiety she does not have control over and if it is healthy for to worry about those things. Patient does feel like she has 2 close friends that she can reach out to and she is comfortable reaching out to them whenever she would need to.  Work on living in the moment, identifying and labeling feelings as they arise. Give yourself permission to take breaks and not feel guilty when you do this. Continue to utilize mindfulness and breathing exercises as they have been helpful to you.    No data recorded      Interventions Provided: Problem Solving Treatment, Behavioral Activation, Develop Coping Strategies, Review Progress/Goals Stress Management, and Mindfulness      Progress Made: Mixed    Response to Intervention: Patient is open and engaging. Patient can process how she feels and how it can impact her behaviors if she is worried. Patient to continue to use mindfulness and  meditation as well as breathing exercises when she has symptoms of anxiety              Follow Up / Next Appointment:    10/2/2024@12:30p

## 2024-09-19 ENCOUNTER — TELEPHONE (OUTPATIENT)
Dept: GYNECOLOGIC ONCOLOGY | Facility: HOSPITAL | Age: 64
End: 2024-09-19
Payer: COMMERCIAL

## 2024-09-19 NOTE — TELEPHONE ENCOUNTER
The patient said that she has colonoscopy scheduled for two weeks after her vulvectomy. She wanted to know if her surgeon approved of her having the colonoscopy so close to her vulvectomy or if she should reschedule it. I told her I would ask her surgeon and call her back.

## 2024-09-20 DIAGNOSIS — K21.9 GASTROESOPHAGEAL REFLUX DISEASE, UNSPECIFIED WHETHER ESOPHAGITIS PRESENT: ICD-10-CM

## 2024-09-20 RX ORDER — PANTOPRAZOLE SODIUM 40 MG/1
TABLET, DELAYED RELEASE ORAL
Qty: 30 TABLET | Refills: 1 | Status: SHIPPED | OUTPATIENT
Start: 2024-09-20

## 2024-09-23 ENCOUNTER — DOCUMENTATION (OUTPATIENT)
Dept: PRIMARY CARE | Facility: CLINIC | Age: 64
End: 2024-09-23
Payer: COMMERCIAL

## 2024-09-23 DIAGNOSIS — F32.A DEPRESSION, UNSPECIFIED DEPRESSION TYPE: ICD-10-CM

## 2024-09-23 DIAGNOSIS — F43.10 PTSD (POST-TRAUMATIC STRESS DISORDER): Primary | ICD-10-CM

## 2024-09-23 PROCEDURE — 99493 SBSQ PSYC COLLAB CARE MGMT: CPT | Performed by: FAMILY MEDICINE

## 2024-09-23 RX ORDER — BUPROPION HYDROCHLORIDE 300 MG/1
TABLET ORAL
Qty: 90 TABLET | Refills: 1 | Status: SHIPPED | OUTPATIENT
Start: 2024-09-23

## 2024-09-24 ENCOUNTER — CLINICAL SUPPORT (OUTPATIENT)
Dept: PREADMISSION TESTING | Facility: HOSPITAL | Age: 64
End: 2024-09-24
Payer: COMMERCIAL

## 2024-09-24 NOTE — CPM/PAT NURSE NOTE
"CPM/PAT Nurse Note      Name: Amee You (Amee You)  /Age: 1960/64 y.o.     Amee You is scheduled for Vulvectomy Simple - Bilateral  Anoscopy - Bilateral  Biopsy Anus - Bilateral  on 10/16/24    **Data Input  Past Medical History:   Diagnosis Date    Anxiety     Arthritis     Cardiomyopathy (Multi)     GERD (gastroesophageal reflux disease)     Hyperlipidemia     Hypertension        Past Surgical History:   Procedure Laterality Date    OTHER SURGICAL HISTORY  2021    Vascular bypass       Patient  has no history on file for sexual activity.    Family History   Problem Relation Name Age of Onset    Ovarian cancer Mother      Breast cancer Paternal Grandmother         Allergies   Allergen Reactions    Pregabalin Rash     open skin leisions    Sulfa (Sulfonamide Antibiotics) Rash    Sulfamethoxazole Fever, Rash and Swelling       Prior to Admission medications    Medication Sig Start Date End Date Taking? Authorizing Provider   acetaminophen (Tylenol) 325 mg tablet if needed. 3/10/23   Historical Provider, MD   aspirin 81 mg EC tablet Take 1 tablet (81 mg) by mouth once daily. 21   Historical Provider, MD   atorvastatin (Lipitor) 40 mg tablet Take 1 tablet (40 mg) by mouth once daily at bedtime. 24   Dari Negro MD   buPROPion XL (Wellbutrin XL) 300 mg 24 hr tablet TAKE 1 TABLET(300 MG) BY MOUTH DAILY IN THE MORNING. DO NOT CRUSH, CHEW, OR SPLIT 24   Dari Negro MD   carvedilol (Coreg) 6.25 mg tablet TAKE 2 TABLETS(12.5 MG) BY MOUTH TWICE DAILY 9/3/24   Dari Negro MD   escitalopram (Lexapro) 20 mg tablet TAKE 1 TABLET(20 MG) BY MOUTH DAILY 24   Dari Negro MD   furosemide (Lasix) 40 mg tablet Take 1 tablet (40 mg) by mouth if needed (swelling and \"water retention\"). 10/16/19   Historical Provider, MD   hydrOXYzine HCL (Atarax) 10 mg tablet Take by mouth if needed for itching.    Historical Provider, MD   lidocaine-hydrocortisone-aloe 3-2.5 % (7 gram) kit Insert into " the rectum. Takes as needed after anal surgeries 7/16/21   Historical Provider, MD   meloxicam (Mobic) 15 mg tablet  2/13/24   Historical Provider, MD   pantoprazole (ProtoNix) 40 mg EC tablet TAKE 1 TABLET(40 MG) BY MOUTH DAILY. DO NOT CRUSH, CHEW, OR SPLIT 9/20/24   Dari Negro MD   ramipril (Altace) 10 mg capsule TAKE 1 CAPSULE(10 MG) BY MOUTH DAILY 7/29/24   Dari Negro MD   buPROPion XL (Wellbutrin XL) 300 mg 24 hr tablet Take 1 tablet (300 mg) by mouth once daily in the morning. Do not crush, chew, or split. 4/4/24 9/23/24  Dari Negro MD   pantoprazole (ProtoNix) 40 mg EC tablet TAKE 1 TABLET(40 MG) BY MOUTH DAILY. DO NOT CRUSH, CHEW, OR SPLIT 7/26/24 9/20/24  Dari Negro MD        PAT ROS     DASI Risk Score    No data to display       Caprini DVT Assessment    No data to display       Modified Frailty Index    No data to display       CHADS2 Stroke Risk  Current as of 4 hours ago        N/A 3 to 100%: High Risk   2 to < 3%: Medium Risk   0 to < 2%: Low Risk     Last Change: N/A          This score determines the patient's risk of having a stroke if the patient has atrial fibrillation.        This score is not applicable to this patient. Components are not calculated.          Revised Cardiac Risk Index    No data to display       Apfel Simplified Score    No data to display       Risk Analysis Index Results This Encounter    No data found in the last 1 encounters.     Providers:                                                                                                           Cardiology: Lisha Velazco 10/08/24--MARA Martinez 07/01/21 pre op eval prior to Colon procedure in 2021. 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 She has been treated by Dr. Enriquez in the past with botox injections.     *I think she needs re-excision of abnormal areas. She is seeing Dr Vanegas next week - depending on her findings, we will schedule either for my part alone or in conjunction.         --TESTING:        - EK23  Normal sinus rhythm  Possible septal infarct  When compared with ECG of 2021 15:09,  No significant change was found      - Echo: 07/15/21  CONCLUSIONS:   1. The left ventricular systolic function is normal with a 60-65% estimated ejection fraction.   2. Spectral Doppler shows an impaired relaxation pattern of left ventricular diastolic filling.   3. RVSP within normal limits.  -There is trace tricuspid regurgitation.       - Carotid: 13 see media  IMPRESSION   ----------   Compared to prior study, no prior.     RIGHT SIDE     Common carotid artery: Patent.     Internal carotid artery: 0-19% stenosis.     External carotid artery: Patent.     Vertebral artery: Patent and antegrade flow noted.     Innominate artery: Patent.     Subclavian artery: Patent.     LEFT SIDE     Common carotid artery: Patent.     Internal carotid artery: 20-39% stenosis.     External carotid artery: Patent.     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     Subclavian artery:   Widely patent CCA to subclavian artery bypass graft.           ____________________________________________________________  Medication instructions:   Instructed to hold Vitamins, Supplements and Ibuprofen 7 days prior to surgery         Ila Day LPN  Preadmission Testing

## 2024-09-25 ENCOUNTER — APPOINTMENT (OUTPATIENT)
Dept: PREADMISSION TESTING | Facility: HOSPITAL | Age: 64
End: 2024-09-25
Payer: COMMERCIAL

## 2024-10-01 ENCOUNTER — APPOINTMENT (OUTPATIENT)
Dept: PREADMISSION TESTING | Facility: HOSPITAL | Age: 64
End: 2024-10-01
Payer: COMMERCIAL

## 2024-10-02 ENCOUNTER — APPOINTMENT (OUTPATIENT)
Dept: PRIMARY CARE | Facility: CLINIC | Age: 64
End: 2024-10-02
Payer: COMMERCIAL

## 2024-10-02 ENCOUNTER — SOCIAL WORK (OUTPATIENT)
Dept: PRIMARY CARE | Facility: CLINIC | Age: 64
End: 2024-10-02
Payer: COMMERCIAL

## 2024-10-02 NOTE — PROGRESS NOTES
[facility] ([facility])  Progress Note    Type of Interaction: Phone    Start Time: 12:30p    End Time: 1:30p        Appointment: Scheduled    Reason for Visit: No chief complaint on file.   PTSD      Interval History / Patient Symptoms:   Patient reports they lost another puppy and she felt bad she was not with the pup when she passed away. Patient initially felt bad she wasn't there but she felt like it was coming. Patient reports she is not sleeping well since her dog passed away and since she found out about needing a biopsy. Patient reports she is only sleeping through the night 1 time per week. Patient attended pain management yesterday and she was increased to 100 trazadone. Patient states can be groggy in the morning after taking it. Patient states she is working on when is the best time to take it so that she does sleep well.  Patient is going in on Friday for preadmission test. Patient has a cardiology appt next week. Patient has her biopsy in 2 weeks and she is having a very hard time shutting her mind off worrying about the procedure. Patient reports she does take Vistaril for her sleep but does not help on it's own. Patient is med compliant with her wellbutrin and Prozac.  Patient does not take the trazadone typically nightly. Patient states if her sleep is interrupted she feels groggy the next day. Patient becomes frustrated with her spouse's choices and when she speaks up about how she feels, her spouse in turn will make comments placing blame on her. Patient finds it overwhelming to interact with spouse as he does not listen, validate or hear what she is saying to him. Patient discussed wanting to volunteer which makes her feel good and she enjoys doing it. Patient is looking to volunteer for the [employer] during the holiday season.  No data recorded      Interventions Provided: Brantley Setting, Problem Solving Treatment, Behavioral Activation, Develop Coping Strategies, and Review Progress/Goals  Stress Management      Progress Made: Mixed    Response to Intervention: Patient is open and engaging to process. Patient is reflective in discussing her feelings.  Patient to work on putting her own needs first when needed. Patient to work on self compassion and doing things that make her happy                      Follow Up / Next Appointment:    10/23/2024@12:30p

## 2024-10-03 ASSESSMENT — PATIENT HEALTH QUESTIONNAIRE - PHQ9
3. TROUBLE FALLING OR STAYING ASLEEP: SEVERAL DAYS
10. IF YOU CHECKED OFF ANY PROBLEMS, HOW DIFFICULT HAVE THESE PROBLEMS MADE IT FOR YOU TO DO YOUR WORK, TAKE CARE OF THINGS AT HOME, OR GET ALONG WITH OTHER PEOPLE: SOMEWHAT DIFFICULT
8. MOVING OR SPEAKING SO SLOWLY THAT OTHER PEOPLE COULD HAVE NOTICED. OR THE OPPOSITE, BEING SO FIGETY OR RESTLESS THAT YOU HAVE BEEN MOVING AROUND A LOT MORE THAN USUAL: NOT AT ALL
SUM OF ALL RESPONSES TO PHQ QUESTIONS 1-9: 7
4. FEELING TIRED OR HAVING LITTLE ENERGY: SEVERAL DAYS
2. FEELING DOWN, DEPRESSED OR HOPELESS: SEVERAL DAYS
6. FEELING BAD ABOUT YOURSELF - OR THAT YOU ARE A FAILURE OR HAVE LET YOURSELF OR YOUR FAMILY DOWN: SEVERAL DAYS
1. LITTLE INTEREST OR PLEASURE IN DOING THINGS: SEVERAL DAYS
7. TROUBLE CONCENTRATING ON THINGS, SUCH AS READING THE NEWSPAPER OR WATCHING TELEVISION: SEVERAL DAYS
SUM OF ALL RESPONSES TO PHQ9 QUESTIONS 1 & 2: 2
9. THOUGHTS THAT YOU WOULD BE BETTER OFF DEAD, OR OF HURTING YOURSELF: NOT AT ALL
5. POOR APPETITE OR OVEREATING: SEVERAL DAYS

## 2024-10-03 ASSESSMENT — ANXIETY QUESTIONNAIRES
3. WORRYING TOO MUCH ABOUT DIFFERENT THINGS: SEVERAL DAYS
5. BEING SO RESTLESS THAT IT IS HARD TO SIT STILL: SEVERAL DAYS
7. FEELING AFRAID AS IF SOMETHING AWFUL MIGHT HAPPEN: NOT AT ALL
1. FEELING NERVOUS, ANXIOUS, OR ON EDGE: SEVERAL DAYS
GAD7 TOTAL SCORE: 6
IF YOU CHECKED OFF ANY PROBLEMS ON THIS QUESTIONNAIRE, HOW DIFFICULT HAVE THESE PROBLEMS MADE IT FOR YOU TO DO YOUR WORK, TAKE CARE OF THINGS AT HOME, OR GET ALONG WITH OTHER PEOPLE: SOMEWHAT DIFFICULT
2. NOT BEING ABLE TO STOP OR CONTROL WORRYING: SEVERAL DAYS
4. TROUBLE RELAXING: SEVERAL DAYS
6. BECOMING EASILY ANNOYED OR IRRITABLE: SEVERAL DAYS

## 2024-10-04 ENCOUNTER — PRE-ADMISSION TESTING (OUTPATIENT)
Dept: PREADMISSION TESTING | Facility: HOSPITAL | Age: 64
End: 2024-10-04
Payer: COMMERCIAL

## 2024-10-04 VITALS
BODY MASS INDEX: 29.16 KG/M2 | HEIGHT: 59 IN | TEMPERATURE: 97.3 F | DIASTOLIC BLOOD PRESSURE: 85 MMHG | SYSTOLIC BLOOD PRESSURE: 132 MMHG | OXYGEN SATURATION: 98 % | HEART RATE: 65 BPM | WEIGHT: 144.62 LBS

## 2024-10-04 DIAGNOSIS — N90.3 VULVAR DYSPLASIA: ICD-10-CM

## 2024-10-04 LAB
ANION GAP SERPL CALC-SCNC: 10 MMOL/L (ref 10–20)
BUN SERPL-MCNC: 6 MG/DL (ref 6–23)
CALCIUM SERPL-MCNC: 9.3 MG/DL (ref 8.6–10.6)
CHLORIDE SERPL-SCNC: 108 MMOL/L (ref 98–107)
CO2 SERPL-SCNC: 33 MMOL/L (ref 21–32)
CREAT SERPL-MCNC: 0.71 MG/DL (ref 0.5–1.05)
EGFRCR SERPLBLD CKD-EPI 2021: >90 ML/MIN/1.73M*2
ERYTHROCYTE [DISTWIDTH] IN BLOOD BY AUTOMATED COUNT: 12.7 % (ref 11.5–14.5)
GLUCOSE SERPL-MCNC: 63 MG/DL (ref 74–99)
HCT VFR BLD AUTO: 44 % (ref 36–46)
HGB BLD-MCNC: 14.2 G/DL (ref 12–16)
MCH RBC QN AUTO: 30.8 PG (ref 26–34)
MCHC RBC AUTO-ENTMCNC: 32.3 G/DL (ref 32–36)
MCV RBC AUTO: 95 FL (ref 80–100)
NRBC BLD-RTO: 0 /100 WBCS (ref 0–0)
PLATELET # BLD AUTO: 204 X10*3/UL (ref 150–450)
POTASSIUM SERPL-SCNC: 4.2 MMOL/L (ref 3.5–5.3)
RBC # BLD AUTO: 4.61 X10*6/UL (ref 4–5.2)
SODIUM SERPL-SCNC: 147 MMOL/L (ref 136–145)
WBC # BLD AUTO: 6.2 X10*3/UL (ref 4.4–11.3)

## 2024-10-04 PROCEDURE — 85027 COMPLETE CBC AUTOMATED: CPT

## 2024-10-04 PROCEDURE — 99406 BEHAV CHNG SMOKING 3-10 MIN: CPT | Performed by: NURSE PRACTITIONER

## 2024-10-04 PROCEDURE — 36415 COLL VENOUS BLD VENIPUNCTURE: CPT

## 2024-10-04 PROCEDURE — 82374 ASSAY BLOOD CARBON DIOXIDE: CPT

## 2024-10-04 PROCEDURE — 99205 OFFICE O/P NEW HI 60 MIN: CPT | Performed by: NURSE PRACTITIONER

## 2024-10-04 RX ORDER — TRAZODONE HYDROCHLORIDE 50 MG/1
50 TABLET ORAL NIGHTLY
COMMUNITY

## 2024-10-04 ASSESSMENT — DUKE ACTIVITY SCORE INDEX (DASI)
CAN YOU HAVE SEXUAL RELATIONS: YES
CAN YOU PARTICIPATE IN STRENOUS SPORTS LIKE SWIMMING, SINGLES TENNIS, FOOTBALL, BASKETBALL, OR SKIING: YES
CAN YOU WALK INDOORS, SUCH AS AROUND YOUR HOUSE: YES
CAN YOU DO HEAVY WORK AROUND THE HOUSE LIKE SCRUBBING FLOORS OR LIFTING AND MOVING HEAVY FURNITURE: YES
DASI METS SCORE: 9.9
TOTAL_SCORE: 58.2
CAN YOU TAKE CARE OF YOURSELF (EAT, DRESS, BATHE, OR USE TOILET): YES
CAN YOU RUN A SHORT DISTANCE: YES
CAN YOU CLIMB A FLIGHT OF STAIRS OR WALK UP A HILL: YES
CAN YOU WALK A BLOCK OR TWO ON LEVEL GROUND: YES
CAN YOU DO LIGHT WORK AROUND THE HOUSE LIKE DUSTING OR WASHING DISHES: YES
CAN YOU DO YARD WORK LIKE RAKING LEAVES, WEEDING OR PUSHING A MOWER: YES
CAN YOU DO MODERATE WORK AROUND THE HOUSE LIKE VACUUMING, SWEEPING FLOORS OR CARRYING GROCERIES: YES
CAN YOU PARTICIPATE IN MODERATE RECREATIONAL ACTIVITIES LIKE GOLF, BOWLING, DANCING, DOUBLES TENNIS OR THROWING A BASEBALL OR FOOTBALL: YES

## 2024-10-04 ASSESSMENT — ENCOUNTER SYMPTOMS
CONSTITUTIONAL NEGATIVE: 1
ABDOMINAL DISTENTION: 1
ABDOMINAL PAIN: 1
ARTHRALGIAS: 1
NECK NEGATIVE: 1
DYSURIA: 1
COUGH: 1
ENDOCRINE NEGATIVE: 1
CARDIOVASCULAR NEGATIVE: 1
DIARRHEA: 1
NEUROLOGICAL NEGATIVE: 1
EYES NEGATIVE: 1

## 2024-10-04 ASSESSMENT — LIFESTYLE VARIABLES: SMOKING_STATUS: SMOKER

## 2024-10-04 NOTE — PREPROCEDURE INSTRUCTIONS
Fasting Guidelines    NPO Instructions:    Do not eat any food after midnight the night before your surgery/procedure.  You may have up to 13.5 ounces of clear liquids until TWO hours before your instructed arrival time to the hospital. This includes water, black tea/coffee, (no milk or cream), apple juice, and/or electrolyte drinks (Gatorade).  You may chew gum up to TWO hours before your surgery/procedure.    Additional Instructions:    Avoid herbal supplements, multivitamins and NSAIDS (non-steroidal anti-inflammatory drugs) such as Advil, Aleve, Ibuprofen, Naproxen, Excedrin, Meloxicam or Celebrex for at least 7 days prior to surgery. May take Tylenol as needed.    Avoid tobacco and alcohol products for 24 hours prior to surgery.    CONTACT SURGEON'S OFFICE IF YOU DEVELOP:  * Fever = 100.4 F   * New respiratory symptoms (e.g. cough, shortness of breath, respiratory distress, sore throat)  * Recent loss of taste or smell  *Flu like symptoms such as headache, fatigue or gastrointestinal symptoms  * You develop any open sores, shingles, burning or painful urination   AND/OR:  * You no longer wish to have the surgery.  * Any other personal circumstances change that may lead to the need to cancel or defer this surgery.  *You were admitted to any hospital within one week of your planned procedure.    Seven/Six Days before Surgery:  Review your medication instructions, stop indicated medications    Day of Surgery:  Review your medication instructions, take indicated medications  Wear comfortable loose fitting clothing  Do not use moisturizers, creams, lotions or perfume  All jewelry and valuables should be left at home    [facility]  [facility]  [phone]  [phone]  [email or web address]      Preoperative Brain Exercises    What are brain exercises?  A brain exercise is any activity that engages your thinking (cognitive) skills.    What types of  activities are considered brain exercises?  Jigsaw puzzles, crossword puzzles, word jumble, memory games, word search, and many more.  Many can be found free online or on your phone via a mobile keisha.    Why should I do brain exercises before my surgery?  More recent research has shown brain exercise before surgery can lower the risk of postoperative delirium (confusion) which can be especially important for older adults.  Patients who did brain exercises for 5 to 10 hours the days before surgery, cut their risk of postoperative delirium in half up to 1 week after surgery.         [facility]    Preoperative Deep Breathing Exercises    Why it is important to do deep breathing exercises before my surgery?  Deep breathing exercises strengthen your breathing muscles.  This helps you to recover after your surgery and decreases the chance of breathing complications.      How are the deep breathing exercises done?  Sit straight with your back supported.  Breathe in deeply and slowly through your nose. Your lower rib cage should expand and your abdomen may move forward.  Hold that breath for 3 to 5 seconds.  Breathe out through pursed lips, slowly and completely.  Rest and repeat 10 times every hour while awake.  Rest longer if you become dizzy or lightheaded.         Patient and Family Education             Ways You Can Help Prevent Blood Clots             This handout explains some simple things you can do to help prevent blood clots.      Blood clots are blockages that can form in the body's veins. When a blood clot forms in your deep veins, it may be called a deep vein thrombosis, or DVT for short. Blood clots can happen in any part of the body where blood flows, but they are most common in the arms and legs. If a piece of a blood clot breaks free and travels to the lungs, it is called a pulmonary embolus (PE). A PE can be a very serious problem.         Being in the hospital or having surgery  can raise your chances of getting a blood clot because you may not be well enough to move around as much as you normally do.         Ways you can help prevent blood clots in the hospital         Wearing SCDs. SCDs stands for Sequential Compression Devices.   SCDs are special sleeves that wrap around your legs  They attach to a pump that fills them with air to gently squeeze your legs every few minutes.   This helps return the blood in your legs to your heart.   SCDs should only be taken off when walking or bathing.   SCDs may not be comfortable, but they can help save your life.               Wearing compression stockings - if your doctor orders them. These special snug fitting stockings gently squeeze your legs to help blood flow.       Walking. Walking helps move the blood in your legs.   If your doctor says it is ok, try walking the halls at least   5 times a day. Ask us to help you get up, so you don't fall.      Taking any blood thinning medicines your doctor orders.        Page 1 of 2     [facility]; 3/23   Ways you can help prevent blood clots at home       Wearing compression stockings - if your doctor orders them. ? Walking - to help move the blood in your legs.       Taking any blood thinning medicines your doctor orders.      Signs of a blood clot or PE      Tell your doctor or nurse know right away if you have of the problems listed below.    If you are at home, seek medical care right away. Call 911 for chest pain or problems breathing.               Signs of a blood clot (DVT) - such as pain,  swelling, redness or warmth in your arm or leg      Signs of a pulmonary embolism (PE) - such as chest     pain or feeling short of breath

## 2024-10-04 NOTE — CPM/PAT H&P
CPM/PAT Evaluation       Name: Amee You (Amee You)  /Age: 1960/64 y.o.     Visit Type:   In-Person       Chief Complaint: vulvar dysphasia    HPI  The patient is a history of VIN3; additionally with moderately differentiated SCC of the anus, persistent anal dysplasia. She presents today for perioperative evaluation in anticipation of Vulvectomy Simple - Bilateral, Anoscopy - Bilateral, Biopsy Anus - Bilateral on 10/16/24 with Dr. Vanegas/Dr. Lepe.    Past Medical History:   Diagnosis Date    Anal dysplasia     Seen by Dr. Josie Lepe on 24    Anxiety     Arthritis     Depression     GERD (gastroesophageal reflux disease)     Hx of thoracic outlet syndrome     Hyperlipidemia     Hypertension     PTSD (post-traumatic stress disorder)     Subclavian artery stenosis, left     s/p Carotid subclavian bypass in     Vulvar dysplasia     Seen by Dr. Reba Vanegas on 24       Past Surgical History:   Procedure Laterality Date    COLONOSCOPY      FLEXIBLE SIGMOIDOSCOPY      KNEE ARTHROPLASTY Right     OTHER SURGICAL HISTORY      Carotid subclavian bypass    TUBAL LIGATION         Patient Sexual activity questions deferred to the physician.    Family History   Problem Relation Name Age of Onset    Ovarian cancer Mother      Stroke Mother      Heart attack Father      Hypertension Father      Brain cancer Brother      Breast cancer Maternal Grandmother      Diabetes Paternal Grandmother         Allergies   Allergen Reactions    Pregabalin Rash     open skin leisions    Sulfa (Sulfonamide Antibiotics) Rash    Sulfamethoxazole Fever, Rash and Swelling       Prior to Admission medications    Medication Sig Start Date End Date Taking? Authorizing Provider   acetaminophen (Tylenol) 325 mg tablet if needed. 3/10/23   Historical Provider, MD   aspirin 81 mg EC tablet Take 1 tablet (81 mg) by mouth once daily. 21   Historical Provider, MD   atorvastatin (Lipitor) 40 mg  "tablet Take 1 tablet (40 mg) by mouth once daily at bedtime. 8/27/24   Dari Negro MD   buPROPion XL (Wellbutrin XL) 300 mg 24 hr tablet TAKE 1 TABLET(300 MG) BY MOUTH DAILY IN THE MORNING. DO NOT CRUSH, CHEW, OR SPLIT 9/23/24   Dari Negro MD   carvedilol (Coreg) 6.25 mg tablet TAKE 2 TABLETS(12.5 MG) BY MOUTH TWICE DAILY 9/3/24   Dari Negro MD   escitalopram (Lexapro) 20 mg tablet TAKE 1 TABLET(20 MG) BY MOUTH DAILY 9/4/24   Dari Negro MD   furosemide (Lasix) 40 mg tablet Take 1 tablet (40 mg) by mouth if needed (swelling and \"water retention\"). 10/16/19   Historical Provider, MD   hydrOXYzine HCL (Atarax) 10 mg tablet Take by mouth if needed for itching.    Historical Provider, MD   lidocaine-hydrocortisone-aloe 3-2.5 % (7 gram) kit Insert into the rectum. Takes as needed after anal surgeries 7/16/21   Historical Provider, MD   meloxicam (Mobic) 15 mg tablet  2/13/24   Historical Provider, MD   pantoprazole (ProtoNix) 40 mg EC tablet TAKE 1 TABLET(40 MG) BY MOUTH DAILY. DO NOT CRUSH, CHEW, OR SPLIT 9/20/24   Dari Negro MD   ramipril (Altace) 10 mg capsule TAKE 1 CAPSULE(10 MG) BY MOUTH DAILY 7/29/24   Dari Negro MD        PAT ROS:   Constitutional:   neg    Neuro/Psych:    Lower extremity paresthesias  neg    Eyes:   neg     use of corrective lenses  Ears:   neg    Nose:   neg    Mouth:   neg    Throat:   neg    Neck:   neg    Cardio:   neg    Respiratory:    cough (chronic, intermittent)  Endocrine:   neg    GI:    abdominal distention   abdominal pain   diarrhea  :    dysuria  Musculoskeletal:    Low back pain   arthralgias  Hematologic:   neg    Skin:  neg        PAT Physical Exam     PAT AIRWAY:   Airway:     Mallampati::  III    TM distance::  >3 FB    Neck ROM::  Full   upper dentures and implants      Visit Vitals  /85   Pulse 65   Temp 36.3 °C (97.3 °F) (Temporal)   Ht 1.499 m (4' 11\")   Wt 65.6 kg (144 lb 10 oz)   SpO2 98%   BMI 29.21 kg/m²   OB Status Unknown   Smoking Status Every Day   BSA " 1.65 m²          DASI Risk Score      Flowsheet Row Pre-Admission Testing from 10/4/2024 in [facility]   Can you take care of yourself (eat, dress, bathe, or use toilet)?  2.75 filed at 10/04/2024 1414   Can you walk indoors, such as around your house? 1.75 filed at 10/04/2024 1414   Can you walk a block or two on level ground?  2.75 filed at 10/04/2024 1414   Can you climb a flight of stairs or walk up a hill? 5.5 filed at 10/04/2024 1414   Can you run a short distance? 8 filed at 10/04/2024 1414   Can you do light work around the house like dusting or washing dishes? 2.7 filed at 10/04/2024 1414   Can you do moderate work around the house like vacuuming, sweeping floors or carrying groceries? 3.5 filed at 10/04/2024 1414   Can you do heavy work around the house like scrubbing floors or lifting and moving heavy furniture?  8 filed at 10/04/2024 1414   Can you do yard work like raking leaves, weeding or pushing a mower? 4.5 filed at 10/04/2024 1414   Can you have sexual relations? 5.25 filed at 10/04/2024 1414   Can you participate in moderate recreational activities like golf, bowling, dancing, doubles tennis or throwing a baseball or football? 6 filed at 10/04/2024 1414   Can you participate in strenous sports like swimming, singles tennis, football, basketball, or skiing? 7.5 filed at 10/04/2024 1414   DASI SCORE 58.2 filed at 10/04/2024 1414   METS Score (Will be calculated only when all the questions are answered) 9.9 filed at 10/04/2024 1414          Caprini DVT Assessment      Flowsheet Row Pre-Admission Testing from 10/4/2024 in [facility]   DVT Score 7 filed at 10/04/2024 1429   Medical Factors COPD filed at 10/04/2024 1429   Surgical Factors Major surgery planned, lasting 2-3 hours filed at 10/04/2024 1429   BMI 30 or less filed at 10/04/2024 1429          Modified Frailty Index      Flowsheet Row Pre-Admission Testing from 10/4/2024 in [facility]    Non-independent functional status (problems with dressing, bathing, personal grooming, or cooking) 0 filed at 10/04/2024 1430   History of diabetes mellitus  0 filed at 10/04/2024 1430   History of COPD 0.0909 filed at 10/04/2024 1430   History of CHF 0.0909 filed at 10/04/2024 1430   History of MI 0 filed at 10/04/2024 1430   History of Percutaneous Coronary Intervention, Cardiac Surgery, or Angina No filed at 10/04/2024 1430   Hypertension requiring the use of medication  0.0909 filed at 10/04/2024 1430   Peripheral vascular disease 0.0909 filed at 10/04/2024 1430   Impaired sensorium (cognitive impairement or loss, clouding, or delirium) 0 filed at 10/04/2024 1430   TIA or CVA withouy residual deficit 0 filed at 10/04/2024 1430   Cerebrovascular accident with deficit 0 filed at 10/04/2024 1430   Modified Frailty Index Calculator .3636 filed at 10/04/2024 1430          CHADS2 Stroke Risk  Current as of today        N/A 3 to 100%: High Risk   2 to < 3%: Medium Risk   0 to < 2%: Low Risk     Last Change: N/A          This score determines the patient's risk of having a stroke if the patient has atrial fibrillation.        This score is not applicable to this patient. Components are not calculated.          Revised Cardiac Risk Index      Flowsheet Row Pre-Admission Testing from 10/4/2024 in [facility]   High-Risk Surgery (Intraperitoneal, Intrathoracic,Suprainguinal vascular) 0 filed at 10/04/2024 1429   History of ischemic heart disease (History of MI, History of positive exercuse test, Current chest paint considered due to myocardial ischemia, Use of nitrate therapy, ECG with pathological Q Waves) 1 filed at 10/04/2024 1429   History of congestive heart failure (pulmonary edemia, bilateral rales or S3 gallop, Paroxysmal nocturnal dyspnea, CXR showing pulmonary vascular redistribution) 0 filed at 10/04/2024 1429   History of cerebrovascular disease (Prior TIA or stroke) 0 filed at 10/04/2024 1429    Pre-operative insulin treatment 0 filed at 10/04/2024 1429   Pre-operative creatinine>2 mg/dl 0 filed at 10/04/2024 1429   Revised Cardiac Risk Calculator 1 filed at 10/04/2024 1429          Apfel Simplified Score      Flowsheet Row Pre-Admission Testing from 10/4/2024 in [facility]   Smoking status 0 filed at 10/04/2024 1430   History of motion sickness or PONV  0 filed at 10/04/2024 1430   Use of postoperative opioids 1 filed at 10/04/2024 1430   Gender - Female 1=Yes filed at 10/04/2024 1430   Apfel Simplified Score Calculator 2 filed at 10/04/2024 1430          Risk Analysis Index Results This Encounter    No data found in the last 10 encounters.       Stop Bang Score      Flowsheet Row Pre-Admission Testing from 10/4/2024 in [facility]   Do you snore loudly? 0 filed at 10/04/2024 1414   Do you often feel tired or fatigued after your sleep? 0 filed at 10/04/2024 1414   Has anyone ever observed you stop breathing in your sleep? 0 filed at 10/04/2024 1414   Do you have or are you being treated for high blood pressure? 1 filed at 10/04/2024 1414   Recent BMI (Calculated) 28.9 filed at 10/04/2024 1414   Is BMI greater than 35 kg/m2? 0=No filed at 10/04/2024 1414   Age older than 50 years old? 1=Yes filed at 10/04/2024 1414   Is your neck circumference greater than 17 inches (Male) or 16 inches (Female)? 0 filed at 10/04/2024 1414   Gender - Male 0=No filed at 10/04/2024 1414   STOP-BANG Total Score 2 filed at 10/04/2024 1414          Recent Results (from the past 168 hour(s))   CBC    Collection Time: 10/04/24  2:36 PM   Result Value Ref Range    WBC 6.2 4.4 - 11.3 x10*3/uL    nRBC 0.0 0.0 - 0.0 /100 WBCs    RBC 4.61 4.00 - 5.20 x10*6/uL    Hemoglobin 14.2 12.0 - 16.0 g/dL    Hematocrit 44.0 36.0 - 46.0 %    MCV 95 80 - 100 fL    MCH 30.8 26.0 - 34.0 pg    MCHC 32.3 32.0 - 36.0 g/dL    RDW 12.7 11.5 - 14.5 %    Platelets 204 150 - 450 x10*3/uL   Basic Metabolic Panel    Collection  Time: 10/04/24  2:36 PM   Result Value Ref Range    Glucose 63 (L) 74 - 99 mg/dL    Sodium 147 (H) 136 - 145 mmol/L    Potassium 4.2 3.5 - 5.3 mmol/L    Chloride 108 (H) 98 - 107 mmol/L    Bicarbonate 33 (H) 21 - 32 mmol/L    Anion Gap 10 10 - 20 mmol/L    Urea Nitrogen 6 6 - 23 mg/dL    Creatinine 0.71 0.50 - 1.05 mg/dL    eGFR >90 >60 mL/min/1.73m*2    Calcium 9.3 8.6 - 10.6 mg/dL        Diagnostic Results    - EK23  Normal sinus rhythm  Possible septal infarct  When compared with ECG of 2021 15:09,  No significant change was found     - Echo: 07/15/21  CONCLUSIONS:   1. The left ventricular systolic function is normal with a 60-65% estimated ejection fraction.   2. Spectral Doppler shows an impaired relaxation pattern of left ventricular diastolic filling.   3. RVSP within normal limits.  -There is trace tricuspid regurgitation.      - Carotid: 13 see media  IMPRESSION   ----------   Compared to prior study, no prior.     RIGHT SIDE   Common carotid artery: Patent.   Internal carotid artery: 0-19% stenosis.   External carotid artery: Patent.   Vertebral artery: Patent and antegrade flow noted.   Innominate artery: Patent.   Subclavian artery: Patent.     LEFT SIDE     Common carotid artery: Patent.   Internal carotid artery: 20-39% stenosis.   External carotid artery: Patent.   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   Subclavian artery:   Widely patent CCA to subclavian artery bypass graft.           Assessment and Plan:     Anesthesia:  The patient denies problems with anesthesia in the past such as PONV, prolonged sedation, awareness, dental damage, aspiration, cardiac arrest, difficult intubation, or unexpected hospital admissions.     Cardiovascular  The patient has hypertension on carvedilol-continue as prescribed in the perioperative period, hyperlipidemia on atorvastatin-continue as prescribed in the perioperative period, cardiomyopathy on  ramipril-instructed to hold 24 hours prior to surgery, lasix-continue as prescribed in the perioperative period, thoracic outlet syndrome, carotid subclavian bypass for L subclavian stenosis 2012, currently on aspirin 81mg-continue as prescribed in the perioperative period.   She is scheduled for non-cardiac surgery associated with elevated risk. The patient has no major cardiac contraindications to non- cardiac surgery.  Cardiology Evaluation  Patient is scheduled to see Dr. Velazco on 10/8/24 for preoperative evaluation.  RCRI  The patient meets 1 RCRI criteria and therefore has a 6% risk of major adverse cardiac complications.  METS  The patient's functional capacity capacity is greater than 4 METS.  LYNETTE score which indicates a 0.1% risk of intraoperative or 30-day postoperative MACE (major adverse cardiac event).    Pulmonary   The patient has COPD (patient denies). .Denies any recent URIs, exacerbations or hospitalizations. The patient is at increased risk of perioperative pulmonary complications secondary to chronic obstructive lung disease, ongoing tobacco abuse, advanced age greater than 60. Smoking cessation discussed.    The patient has a stop bang score of 2, which places patient at low risk for having STACIE.    ARISCAT 19, low, 1.6% risk of in-hospital postoperative pulmonary complications  PRODIGY 8, intermediate risk of respiratory depression episode. Patient given PI sheet for preoperative deep breathing exercises.    Neuro:   The patient has anxiety, depression , PTSD on lexapro and wellbutrin. History of alcohol, cocaine, and opiate use disorders in sustained remission. . The patient is at increased risk for postoperative delirium secondary to depression. The patient is at increased risk for perioperative stroke secondary to hypertension , hyperlipidemia, female gender, general anesthesia. Handouts for preoperative brain exercises given to patient.  Followed by Psych:     HEENT/Airway  No diagnoses,  significant findings on chart review, clinical presentation, or evaluation. No documented or reported history of airway difficulty.     Endocrine  No diagnoses or significant findings on chart review or clinical presentation and evaluation.    Gastrointestinal  The patient has GERD on pantoprazole. No current GI complaints. IBS with chronic diarrhea, anal cancer s/p resection with persistent anal dysplasia.  GI: Neftali Barker CNP, LOV 8/21/24  Eat 10- 0,  self-perceived oropharyngeal dysphagia scale (0-40)     Genitourinary  The patient has recurrent anal dysplasia (history of focal moderately invasive SCC of anus), vulvar dysplasia. Scheduledfor surgery wit    Renal  The patient has no known history of chronic kidney disease. No renal diagnoses or significant findings on chart review or clinical presentation and evaluation. The patient has specific risk factors associated with increased risk of perioperative renal complications related to age greater than 55, hypertension, COPD. Preventative measures include preoperative hydration.    Hematology  No diagnoses or significant findings on chart review or clinical presentation and evaluation.    Caprini score 7, high risk of perioperative VTE.     Patient instructed to ambulate as soon as possible postoperatively to decrease thromboembolic risk. Initiate mechanical DVT prophylaxis as soon as possible and initiate chemical prophylaxis when deemed safe from a bleeding standpoint post surgery.     Transfusion Evaluation  T&S not obtained. Low likelihood for perioperative transfusion of blood or blood products.    Musculoskeletal  The patient has chronic low back pain, DDD,    ID  No diagnoses or significant findings on chart review or clinical presentation and evaluation.    -Preoperative medication instructions were provided and reviewed with the patient.  Any additional testing or evaluation was explained to the patient.  NPO Instructions were discussed, and the patient's  questions were answered prior to conclusion of this encounter. Patient verbalized understanding of preoperative instructions. After Visit Summary given.      Addendum 10/9/24  Patient seen 10/8/24 by cardiology Dr. Velazco.   Per note, Preop clearance:  Patient is at least moderate to high risk based on her history of smoking, hypertension and hyperlipidemia  -Ordering a nuclear stress test prior to clearance     Patient also has abnormal EKG complains of dyspnea on exertion  Ordering an echocardiogram to review her cardiac function     Hypertension: Well-managed on current medication, no changes     Hyperlipidemia: Continue statin     Patient is advised to reach out to me after both the test are done for final clearance.  Advised to quit smoking    Echo pending  Stress pending  EKG pending    Dr. Santoro/Dr. Vanegas emailed regarding update    Addendum 10/14/24    EKG 10/8/24  Normal sinus rhythm  Low voltage QRS  Cannot rule out Anteroseptal infarct , age undetermined  Abnormal ECG  When compared with ECG of 08-FEB-2023 14:08,  Nonspecific T wave abnormality now evident in Anterior leads  Nonspecific T wave abnormality no longer evident in Lateral leads    Nuclear Stress 10/11/24  Impression  1. No evidence of inducible myocardial ischemia or prior infarction.  2. The left ventricle is normal in size.  3. Normal LV wall motion with a post-stress LV EF estimated at  greater than 65%.    TTE 10/11/24  CONCLUSIONS:   1. Left ventricular ejection fraction is normal, calculated by Kumar's biplane at 69%.   2. There is normal right ventricular global systolic function.   3. Right ventricular systolic pressure is within normal limits.      Per Dr. Velazco patient is low risk for surgery from a cardiac risk stand point (see encounter tab 10/14/24)

## 2024-10-08 ENCOUNTER — OFFICE VISIT (OUTPATIENT)
Dept: CARDIOLOGY | Facility: CLINIC | Age: 64
End: 2024-10-08
Payer: COMMERCIAL

## 2024-10-08 ENCOUNTER — TELEPHONE (OUTPATIENT)
Dept: SCHEDULING | Age: 64
End: 2024-10-08

## 2024-10-08 VITALS
BODY MASS INDEX: 29.19 KG/M2 | OXYGEN SATURATION: 93 % | RESPIRATION RATE: 20 BRPM | WEIGHT: 144.8 LBS | DIASTOLIC BLOOD PRESSURE: 85 MMHG | HEART RATE: 67 BPM | SYSTOLIC BLOOD PRESSURE: 151 MMHG | HEIGHT: 59 IN

## 2024-10-08 DIAGNOSIS — Z01.818 PRE-OPERATIVE CLEARANCE: Primary | ICD-10-CM

## 2024-10-08 DIAGNOSIS — E78.2 MIXED HYPERLIPIDEMIA: ICD-10-CM

## 2024-10-08 DIAGNOSIS — R06.09 DOE (DYSPNEA ON EXERTION): ICD-10-CM

## 2024-10-08 DIAGNOSIS — I10 HYPERTENSION, UNSPECIFIED TYPE: ICD-10-CM

## 2024-10-08 DIAGNOSIS — R94.31 ABNORMAL EKG: ICD-10-CM

## 2024-10-08 PROCEDURE — 93005 ELECTROCARDIOGRAM TRACING: CPT | Performed by: INTERNAL MEDICINE

## 2024-10-08 PROCEDURE — 93010 ELECTROCARDIOGRAM REPORT: CPT | Performed by: INTERNAL MEDICINE

## 2024-10-08 PROCEDURE — 3079F DIAST BP 80-89 MM HG: CPT | Performed by: INTERNAL MEDICINE

## 2024-10-08 PROCEDURE — 3077F SYST BP >= 140 MM HG: CPT | Performed by: INTERNAL MEDICINE

## 2024-10-08 PROCEDURE — 4004F PT TOBACCO SCREEN RCVD TLK: CPT | Performed by: INTERNAL MEDICINE

## 2024-10-08 PROCEDURE — 3008F BODY MASS INDEX DOCD: CPT | Performed by: INTERNAL MEDICINE

## 2024-10-08 PROCEDURE — 99204 OFFICE O/P NEW MOD 45 MIN: CPT | Performed by: INTERNAL MEDICINE

## 2024-10-08 PROCEDURE — 99214 OFFICE O/P EST MOD 30 MIN: CPT | Performed by: INTERNAL MEDICINE

## 2024-10-08 RX ORDER — REGADENOSON 0.08 MG/ML
0.4 INJECTION, SOLUTION INTRAVENOUS
Status: CANCELLED | OUTPATIENT
Start: 2024-10-08

## 2024-10-08 RX ORDER — AMINOPHYLLINE 25 MG/ML
125 INJECTION, SOLUTION INTRAVENOUS ONCE AS NEEDED
OUTPATIENT
Start: 2024-10-08

## 2024-10-08 ASSESSMENT — PATIENT HEALTH QUESTIONNAIRE - PHQ9
SUM OF ALL RESPONSES TO PHQ9 QUESTIONS 1 AND 2: 0
1. LITTLE INTEREST OR PLEASURE IN DOING THINGS: NOT AT ALL
2. FEELING DOWN, DEPRESSED OR HOPELESS: NOT AT ALL

## 2024-10-08 ASSESSMENT — COLUMBIA-SUICIDE SEVERITY RATING SCALE - C-SSRS: 1. IN THE PAST MONTH, HAVE YOU WISHED YOU WERE DEAD OR WISHED YOU COULD GO TO SLEEP AND NOT WAKE UP?: NO

## 2024-10-08 ASSESSMENT — PAIN SCALES - GENERAL: PAINLEVEL: 0-NO PAIN

## 2024-10-08 NOTE — PROGRESS NOTES
Subjective   Amee You is a 64 y.o. female.    Past medical history  Hypertension on lisinopril 10, carvedilol 6.25 twice daily, Lasix  Hyperlipidemia on atorvastatin 40  Anal dysplasia and vulvar dysplasia, will likely undergo vulvectomy simple and biopsy of venous    Smokin pack a day    Social history: Lives with her .  Patient has a dog breeding farm and manages it    Chief Complaint:  Pre-op Exam    HPI  Patient is here for preop clearance.  Complains of shortness of breath with exertion like climbing stairs or lifting weight.  No resting dyspnea.  No lower extremity edema.  No chest pain  No palpitation no syncope      ROS  No significant complaints    Objective   Constitutional:       Appearance: Not in distress.   Neck:      Vascular: JVD normal.   Pulmonary:      Effort: Pulmonary effort is normal.      Breath sounds: Normal breath sounds.   Cardiovascular:      PMI at left midclavicular line. Normal rate. Regular rhythm. Normal S1. Normal S2.       Murmurs: There is no murmur.   Edema:     Peripheral edema absent.   Abdominal:      General: Bowel sounds are normal.      Palpations: Abdomen is soft.   Skin:     General: Skin is warm and dry.   Neurological:      General: No focal deficit present.      Mental Status: Alert and oriented to person, place and time.       EKG: Low-voltage and QS complexes in V1-V4      Lab Review:   Lab Results   Component Value Date     (H) 10/04/2024    K 4.2 10/04/2024     (H) 10/04/2024    CO2 33 (H) 10/04/2024    BUN 6 10/04/2024    CREATININE 0.71 10/04/2024    GLUCOSE 63 (L) 10/04/2024    CALCIUM 9.3 10/04/2024     Lab Results   Component Value Date    WBC 6.2 10/04/2024    HGB 14.2 10/04/2024    HCT 44.0 10/04/2024    MCV 95 10/04/2024     10/04/2024     Lab Results   Component Value Date    CHOL 147 2024    TRIG 135 2024    HDL 35.7 2024 TTE  CONCLUSIONS:   1. The left ventricular systolic function is  normal with a 60-65% estimated ejection fraction.   2. Spectral Doppler shows an impaired relaxation pattern of left ventricular diastolic filling.   3. RVSP within normal limits.          Assessment/Plan   64-year-old female with a history of smoking about 1 pack a day, hypertension, hyperlipidemia is here for preop clearance for a surgery involving genitalia where she will undergo vulvectomy and anal biopsy    Patient denies any significant cardiovascular complaints except dyspnea on exertion.  Has a longstanding history of smoking along with multiple risk factors in the form of hypertension, hyperlipidemia.    Preop clearance:  Patient is at least moderate to high risk based on her history of smoking, hypertension and hyperlipidemia  -Ordering a nuclear stress test prior to clearance    Patient also has abnormal EKG complains of dyspnea on exertion  Ordering an echocardiogram to review her cardiac function    Hypertension: Well-managed on current medication, no changes    Hyperlipidemia: Continue statin    Patient is advised to reach out to me after both the test are done for final clearance.  Advised to quit smoking

## 2024-10-10 DIAGNOSIS — R06.09 DOE (DYSPNEA ON EXERTION): ICD-10-CM

## 2024-10-10 DIAGNOSIS — Z01.818 PRE-OPERATIVE CLEARANCE: Primary | ICD-10-CM

## 2024-10-10 LAB
ATRIAL RATE: 67 BPM
P AXIS: 76 DEGREES
P OFFSET: 210 MS
P ONSET: 148 MS
PR INTERVAL: 158 MS
Q ONSET: 227 MS
QRS COUNT: 11 BEATS
QRS DURATION: 80 MS
QT INTERVAL: 430 MS
QTC CALCULATION(BAZETT): 454 MS
QTC FREDERICIA: 446 MS
R AXIS: 48 DEGREES
T AXIS: 6 DEGREES
T OFFSET: 442 MS
VENTRICULAR RATE: 67 BPM

## 2024-10-10 RX ORDER — AMINOPHYLLINE 25 MG/ML
125 INJECTION, SOLUTION INTRAVENOUS ONCE AS NEEDED
OUTPATIENT
Start: 2024-10-10

## 2024-10-10 RX ORDER — REGADENOSON 0.08 MG/ML
0.4 INJECTION, SOLUTION INTRAVENOUS
OUTPATIENT
Start: 2024-10-10

## 2024-10-10 NOTE — PROGRESS NOTES
Yesterday I saw the patient for preop clearance.  Patient has a surgery planned next week and her orders for clearance have to be changed to stat

## 2024-10-11 ENCOUNTER — HOSPITAL ENCOUNTER (OUTPATIENT)
Dept: CARDIOLOGY | Facility: HOSPITAL | Age: 64
Discharge: HOME | End: 2024-10-11
Payer: COMMERCIAL

## 2024-10-11 ENCOUNTER — HOSPITAL ENCOUNTER (OUTPATIENT)
Dept: RADIOLOGY | Facility: HOSPITAL | Age: 64
Discharge: HOME | End: 2024-10-11
Payer: COMMERCIAL

## 2024-10-11 ENCOUNTER — HOSPITAL ENCOUNTER (OUTPATIENT)
Dept: CARDIOLOGY | Facility: CLINIC | Age: 64
Discharge: HOME | End: 2024-10-11
Payer: COMMERCIAL

## 2024-10-11 DIAGNOSIS — Z01.818 PRE-OPERATIVE CLEARANCE: ICD-10-CM

## 2024-10-11 DIAGNOSIS — R06.09 DOE (DYSPNEA ON EXERTION): ICD-10-CM

## 2024-10-11 DIAGNOSIS — R94.31 ABNORMAL EKG: ICD-10-CM

## 2024-10-11 PROCEDURE — 2500000004 HC RX 250 GENERAL PHARMACY W/ HCPCS (ALT 636 FOR OP/ED): Performed by: INTERNAL MEDICINE

## 2024-10-11 PROCEDURE — 93306 TTE W/DOPPLER COMPLETE: CPT | Performed by: INTERNAL MEDICINE

## 2024-10-11 PROCEDURE — 93306 TTE W/DOPPLER COMPLETE: CPT

## 2024-10-11 PROCEDURE — 93016 CV STRESS TEST SUPVJ ONLY: CPT

## 2024-10-11 PROCEDURE — 78452 HT MUSCLE IMAGE SPECT MULT: CPT

## 2024-10-11 PROCEDURE — 3430000001 HC RX 343 DIAGNOSTIC RADIOPHARMACEUTICALS: Performed by: INTERNAL MEDICINE

## 2024-10-11 PROCEDURE — A9502 TC99M TETROFOSMIN: HCPCS | Performed by: INTERNAL MEDICINE

## 2024-10-11 PROCEDURE — 93017 CV STRESS TEST TRACING ONLY: CPT

## 2024-10-11 PROCEDURE — 93018 CV STRESS TEST I&R ONLY: CPT

## 2024-10-11 RX ORDER — REGADENOSON 0.08 MG/ML
0.4 INJECTION, SOLUTION INTRAVENOUS
Status: COMPLETED | OUTPATIENT
Start: 2024-10-11 | End: 2024-10-11

## 2024-10-11 NOTE — HOSPITAL COURSE
[ x] PAT  [x ] Plan for overnight obs? No?  [x ] Consent  [ x] Preop meds  [x ] Add to list    Amee You is a 64 y.o. female presenting for simple bilateral vulvectomy, anoscopy, and biopsy of anus-vulvar dysplasia.     Preop labs (): Hgb, Plt, Cr  Lab Results   Component Value Date    HGB 14.2 10/04/2024     10/04/2024    CREATININE 0.71 10/04/2024    HGBA1C 5.2 04/04/2024     PAT (10/4): cleared; seen by cards on 10/8, cleared after nuc stress test     Tumor History:  Imaging/pathology  FINAL DIAGNOSIS  A.  POSTERIOR FOURCHETTE, EXCISION:    -- HIGH GRADE SQUAMOUS INTRAEPITHELIAL LESION (HATTIE 3). SEE NOTE    Note: All peripheral margins are involved by HATTIE 3.    B.  LEFT PERINEUM, BIOPSY@4 O'CLOCK:    -- HIGH GRADE SQUAMOUS INTRAEPITHELIAL LESION (VIN3).    C.  ANTERIOR ANAL CANAL, BIOPSY:    -- MARKEDLY FRAGMENTED AND CAUTERIZED HIGH GRADE SQUAMOUS INTRAEPITHELIAL  LESION (AIN 3). SEE NOTE.    Note: The biopsy cannot be definitively evaluate for invasion due to the  fragmentation and cautery artifact.    D.  POSTERIOR ANAL CANAL, BIOPSY:    -- HIGH GRADE SQUAMOUS INTRAEPITHELIAL LESION (AIN 3).     Past Medical History:  Past Medical History:   Diagnosis Date    Anal dysplasia     Seen by Dr. Josie Lepe on 09/03/24    Anxiety     Arthritis     Depression     GERD (gastroesophageal reflux disease)     Hx of thoracic outlet syndrome     Hyperlipidemia     Hypertension     PTSD (post-traumatic stress disorder)     Subclavian artery stenosis, left     s/p Carotid subclavian bypass in 2012    Vulvar dysplasia     Seen by Dr. Reba Vanegas on 09/09/24      Surgical History:  Past Surgical History:   Procedure Laterality Date    COLONOSCOPY      FLEXIBLE SIGMOIDOSCOPY      KNEE ARTHROPLASTY Right     OTHER SURGICAL HISTORY  2012    Carotid subclavian bypass    TUBAL LIGATION        Ob/Gyn History:  OB History   No obstetric history on file.     Social History:  Social History     Socioeconomic  History    Marital status:    Tobacco Use    Smoking status: Every Day     Current packs/day: 1.00     Average packs/day: 1 pack/day for 15.0 years (15.0 ttl pk-yrs)     Types: Cigarettes    Smokeless tobacco: Never   Vaping Use    Vaping status: Never Used   Substance and Sexual Activity    Alcohol use: Not Currently    Drug use: Never    Sexual activity: Defer      Family History:  Family History   Problem Relation Name Age of Onset    Ovarian cancer Mother      Stroke Mother      Heart attack Father      Hypertension Father      Brain cancer Brother      Breast cancer Maternal Grandmother      Diabetes Paternal Grandmother          Medications:  Current Outpatient Medications   Medication Instructions    acetaminophen (Tylenol) 325 mg tablet if needed.    aspirin 81 mg EC tablet 1 tablet, oral, Daily    atorvastatin (LIPITOR) 40 mg, oral, Nightly    buPROPion XL (Wellbutrin XL) 300 mg 24 hr tablet TAKE 1 TABLET(300 MG) BY MOUTH DAILY IN THE MORNING. DO NOT CRUSH, CHEW, OR SPLIT    carvedilol (Coreg) 6.25 mg tablet TAKE 2 TABLETS(12.5 MG) BY MOUTH TWICE DAILY    escitalopram (LEXAPRO) 20 mg, oral, Daily    furosemide (Lasix) 40 mg tablet 1 tablet, oral, As needed    hydrOXYzine HCL (Atarax) 10 mg tablet oral, As needed    lidocaine-hydrocortisone-aloe 3-2.5 % (7 gram) kit rectal, Takes as needed after anal surgeries     meloxicam (Mobic) 15 mg tablet     pantoprazole (ProtoNix) 40 mg EC tablet TAKE 1 TABLET(40 MG) BY MOUTH DAILY. DO NOT CRUSH, CHEW, OR SPLIT    ramipril (Altace) 10 mg capsule TAKE 1 CAPSULE(10 MG) BY MOUTH DAILY    traZODone (DESYREL) 50 mg, oral, Nightly       Allergies:  Pregabalin, Sulfa (sulfonamide antibiotics), and Sulfamethoxazole

## 2024-10-12 LAB
AORTIC VALVE PEAK VELOCITY: 1.47 M/S
AV PEAK GRADIENT: 8.7 MMHG
AVA (PEAK VEL): 1.79 CM2
EJECTION FRACTION APICAL 4 CHAMBER: 70.1
EJECTION FRACTION: 69 %
LEFT ATRIUM VOLUME AREA LENGTH INDEX BSA: 29.6 ML/M2
LEFT VENTRICLE INTERNAL DIMENSION DIASTOLE: 4.72 CM (ref 3.5–6)
LEFT VENTRICULAR OUTFLOW TRACT DIAMETER: 1.72 CM
MITRAL VALVE E/A RATIO: 1.08
RIGHT VENTRICLE FREE WALL PEAK S': 13 CM/S
RIGHT VENTRICLE PEAK SYSTOLIC PRESSURE: 25.8 MMHG
TRICUSPID ANNULAR PLANE SYSTOLIC EXCURSION: 2.1 CM

## 2024-10-14 NOTE — PROGRESS NOTES
Patient had no significant CV symptoms except dyspnea on exertion which may be related to her smoking  -Her echo and stress test are normal  -Low CV risk from surgery from cardiac standpoint

## 2024-10-15 ENCOUNTER — ANESTHESIA EVENT (OUTPATIENT)
Dept: OPERATING ROOM | Facility: HOSPITAL | Age: 64
End: 2024-10-15

## 2024-10-16 ENCOUNTER — ANESTHESIA (OUTPATIENT)
Dept: OPERATING ROOM | Facility: HOSPITAL | Age: 64
End: 2024-10-16

## 2024-10-16 ENCOUNTER — HOSPITAL ENCOUNTER (OUTPATIENT)
Facility: HOSPITAL | Age: 64
Setting detail: OUTPATIENT SURGERY
Discharge: HOME | End: 2024-10-16
Attending: STUDENT IN AN ORGANIZED HEALTH CARE EDUCATION/TRAINING PROGRAM | Admitting: STUDENT IN AN ORGANIZED HEALTH CARE EDUCATION/TRAINING PROGRAM
Payer: COMMERCIAL

## 2024-10-16 VITALS
RESPIRATION RATE: 18 BRPM | BODY MASS INDEX: 29.47 KG/M2 | TEMPERATURE: 97 F | SYSTOLIC BLOOD PRESSURE: 137 MMHG | HEART RATE: 66 BPM | OXYGEN SATURATION: 93 % | DIASTOLIC BLOOD PRESSURE: 68 MMHG | HEIGHT: 59 IN | WEIGHT: 146.16 LBS

## 2024-10-16 DIAGNOSIS — N90.3 VULVAR DYSPLASIA: Primary | ICD-10-CM

## 2024-10-16 PROCEDURE — 2500000005 HC RX 250 GENERAL PHARMACY W/O HCPCS: Performed by: STUDENT IN AN ORGANIZED HEALTH CARE EDUCATION/TRAINING PROGRAM

## 2024-10-16 PROCEDURE — 46600 DIAGNOSTIC ANOSCOPY SPX: CPT | Performed by: SURGERY

## 2024-10-16 PROCEDURE — 7100000009 HC PHASE TWO TIME - INITIAL BASE CHARGE: Performed by: STUDENT IN AN ORGANIZED HEALTH CARE EDUCATION/TRAINING PROGRAM

## 2024-10-16 PROCEDURE — 7100000001 HC RECOVERY ROOM TIME - INITIAL BASE CHARGE: Performed by: STUDENT IN AN ORGANIZED HEALTH CARE EDUCATION/TRAINING PROGRAM

## 2024-10-16 PROCEDURE — 2500000001 HC RX 250 WO HCPCS SELF ADMINISTERED DRUGS (ALT 637 FOR MEDICARE OP)

## 2024-10-16 PROCEDURE — 2500000001 HC RX 250 WO HCPCS SELF ADMINISTERED DRUGS (ALT 637 FOR MEDICARE OP): Performed by: STUDENT IN AN ORGANIZED HEALTH CARE EDUCATION/TRAINING PROGRAM

## 2024-10-16 PROCEDURE — 3600000003 HC OR TIME - INITIAL BASE CHARGE - PROCEDURE LEVEL THREE: Performed by: STUDENT IN AN ORGANIZED HEALTH CARE EDUCATION/TRAINING PROGRAM

## 2024-10-16 PROCEDURE — 3700000001 HC GENERAL ANESTHESIA TIME - INITIAL BASE CHARGE: Performed by: STUDENT IN AN ORGANIZED HEALTH CARE EDUCATION/TRAINING PROGRAM

## 2024-10-16 PROCEDURE — 2500000004 HC RX 250 GENERAL PHARMACY W/ HCPCS (ALT 636 FOR OP/ED)

## 2024-10-16 PROCEDURE — 88141 CYTOPATH C/V INTERPRET: CPT | Performed by: PATHOLOGY

## 2024-10-16 PROCEDURE — 88305 TISSUE EXAM BY PATHOLOGIST: CPT | Mod: TC,SUR | Performed by: STUDENT IN AN ORGANIZED HEALTH CARE EDUCATION/TRAINING PROGRAM

## 2024-10-16 PROCEDURE — 7100000010 HC PHASE TWO TIME - EACH INCREMENTAL 1 MINUTE: Performed by: STUDENT IN AN ORGANIZED HEALTH CARE EDUCATION/TRAINING PROGRAM

## 2024-10-16 PROCEDURE — 7100000002 HC RECOVERY ROOM TIME - EACH INCREMENTAL 1 MINUTE: Performed by: STUDENT IN AN ORGANIZED HEALTH CARE EDUCATION/TRAINING PROGRAM

## 2024-10-16 PROCEDURE — 2500000004 HC RX 250 GENERAL PHARMACY W/ HCPCS (ALT 636 FOR OP/ED): Performed by: STUDENT IN AN ORGANIZED HEALTH CARE EDUCATION/TRAINING PROGRAM

## 2024-10-16 PROCEDURE — 88175 CYTOPATH C/V AUTO FLUID REDO: CPT | Mod: TC,GCY | Performed by: STUDENT IN AN ORGANIZED HEALTH CARE EDUCATION/TRAINING PROGRAM

## 2024-10-16 PROCEDURE — 88309 TISSUE EXAM BY PATHOLOGIST: CPT | Performed by: PATHOLOGY

## 2024-10-16 PROCEDURE — 3700000002 HC GENERAL ANESTHESIA TIME - EACH INCREMENTAL 1 MINUTE: Performed by: STUDENT IN AN ORGANIZED HEALTH CARE EDUCATION/TRAINING PROGRAM

## 2024-10-16 PROCEDURE — 2500000005 HC RX 250 GENERAL PHARMACY W/O HCPCS

## 2024-10-16 PROCEDURE — 3600000008 HC OR TIME - EACH INCREMENTAL 1 MINUTE - PROCEDURE LEVEL THREE: Performed by: STUDENT IN AN ORGANIZED HEALTH CARE EDUCATION/TRAINING PROGRAM

## 2024-10-16 PROCEDURE — 88305 TISSUE EXAM BY PATHOLOGIST: CPT | Performed by: PATHOLOGY

## 2024-10-16 PROCEDURE — 2720000007 HC OR 272 NO HCPCS: Performed by: STUDENT IN AN ORGANIZED HEALTH CARE EDUCATION/TRAINING PROGRAM

## 2024-10-16 RX ORDER — ONDANSETRON HYDROCHLORIDE 2 MG/ML
4 INJECTION, SOLUTION INTRAVENOUS ONCE AS NEEDED
Status: DISCONTINUED | OUTPATIENT
Start: 2024-10-16 | End: 2024-10-16 | Stop reason: HOSPADM

## 2024-10-16 RX ORDER — LABETALOL HYDROCHLORIDE 5 MG/ML
5 INJECTION, SOLUTION INTRAVENOUS ONCE AS NEEDED
Status: DISCONTINUED | OUTPATIENT
Start: 2024-10-16 | End: 2024-10-16 | Stop reason: HOSPADM

## 2024-10-16 RX ORDER — OXYCODONE HYDROCHLORIDE 5 MG/1
10 TABLET ORAL EVERY 4 HOURS PRN
Status: DISCONTINUED | OUTPATIENT
Start: 2024-10-16 | End: 2024-10-16 | Stop reason: HOSPADM

## 2024-10-16 RX ORDER — MIDAZOLAM HYDROCHLORIDE 1 MG/ML
INJECTION INTRAMUSCULAR; INTRAVENOUS CONTINUOUS PRN
Status: DISCONTINUED | OUTPATIENT
Start: 2024-10-16 | End: 2024-10-16

## 2024-10-16 RX ORDER — FENTANYL CITRATE 50 UG/ML
INJECTION, SOLUTION INTRAMUSCULAR; INTRAVENOUS AS NEEDED
Status: DISCONTINUED | OUTPATIENT
Start: 2024-10-16 | End: 2024-10-16

## 2024-10-16 RX ORDER — ACETIC ACID 5 %
LIQUID (ML) MISCELLANEOUS CONTINUOUS PRN
Status: COMPLETED | OUTPATIENT
Start: 2024-10-16 | End: 2024-10-16

## 2024-10-16 RX ORDER — OXYCODONE HYDROCHLORIDE 5 MG/1
5 TABLET ORAL EVERY 6 HOURS PRN
Qty: 7 TABLET | Refills: 0 | Status: SHIPPED | OUTPATIENT
Start: 2024-10-16

## 2024-10-16 RX ORDER — ACETAMINOPHEN 325 MG/1
975 TABLET ORAL ONCE
Status: COMPLETED | OUTPATIENT
Start: 2024-10-16 | End: 2024-10-16

## 2024-10-16 RX ORDER — HYDROMORPHONE HYDROCHLORIDE 1 MG/ML
0.2 INJECTION, SOLUTION INTRAMUSCULAR; INTRAVENOUS; SUBCUTANEOUS EVERY 5 MIN PRN
Status: DISCONTINUED | OUTPATIENT
Start: 2024-10-16 | End: 2024-10-16 | Stop reason: HOSPADM

## 2024-10-16 RX ORDER — DEXMEDETOMIDINE IN 0.9 % NACL 20 MCG/5ML
SYRINGE (ML) INTRAVENOUS AS NEEDED
Status: DISCONTINUED | OUTPATIENT
Start: 2024-10-16 | End: 2024-10-16

## 2024-10-16 RX ORDER — CEFAZOLIN 1 G/1
INJECTION, POWDER, FOR SOLUTION INTRAVENOUS AS NEEDED
Status: DISCONTINUED | OUTPATIENT
Start: 2024-10-16 | End: 2024-10-16

## 2024-10-16 RX ORDER — GABAPENTIN 600 MG/1
600 TABLET ORAL ONCE
Status: COMPLETED | OUTPATIENT
Start: 2024-10-16 | End: 2024-10-16

## 2024-10-16 RX ORDER — ALBUTEROL SULFATE 0.83 MG/ML
2.5 SOLUTION RESPIRATORY (INHALATION) ONCE
Status: DISCONTINUED | OUTPATIENT
Start: 2024-10-16 | End: 2024-10-16 | Stop reason: HOSPADM

## 2024-10-16 RX ORDER — SODIUM CHLORIDE, SODIUM LACTATE, POTASSIUM CHLORIDE, CALCIUM CHLORIDE 600; 310; 30; 20 MG/100ML; MG/100ML; MG/100ML; MG/100ML
20 INJECTION, SOLUTION INTRAVENOUS CONTINUOUS
Status: DISCONTINUED | OUTPATIENT
Start: 2024-10-16 | End: 2024-10-16 | Stop reason: HOSPADM

## 2024-10-16 RX ORDER — ACETAMINOPHEN 500 MG/1
1000 CAPSULE, LIQUID FILLED ORAL EVERY 6 HOURS PRN
Qty: 20 CAPSULE | Refills: 0 | Status: SHIPPED | OUTPATIENT
Start: 2024-10-16 | End: 2024-11-15

## 2024-10-16 RX ORDER — PROPOFOL 10 MG/ML
INJECTION, EMULSION INTRAVENOUS AS NEEDED
Status: DISCONTINUED | OUTPATIENT
Start: 2024-10-16 | End: 2024-10-16

## 2024-10-16 RX ORDER — SODIUM CHLORIDE 0.9 G/100ML
IRRIGANT IRRIGATION AS NEEDED
Status: DISCONTINUED | OUTPATIENT
Start: 2024-10-16 | End: 2024-10-16 | Stop reason: HOSPADM

## 2024-10-16 RX ORDER — NORETHINDRONE AND ETHINYL ESTRADIOL 0.5-0.035
KIT ORAL AS NEEDED
Status: DISCONTINUED | OUTPATIENT
Start: 2024-10-16 | End: 2024-10-16

## 2024-10-16 RX ORDER — HYDROMORPHONE HYDROCHLORIDE 1 MG/ML
0.5 INJECTION, SOLUTION INTRAMUSCULAR; INTRAVENOUS; SUBCUTANEOUS EVERY 5 MIN PRN
Status: DISCONTINUED | OUTPATIENT
Start: 2024-10-16 | End: 2024-10-16 | Stop reason: HOSPADM

## 2024-10-16 RX ORDER — LIDOCAINE HYDROCHLORIDE 10 MG/ML
0.1 INJECTION, SOLUTION EPIDURAL; INFILTRATION; INTRACAUDAL; PERINEURAL ONCE
Status: DISCONTINUED | OUTPATIENT
Start: 2024-10-16 | End: 2024-10-16 | Stop reason: HOSPADM

## 2024-10-16 RX ORDER — GABAPENTIN 300 MG/1
1 CAPSULE ORAL 2 TIMES DAILY
COMMUNITY
Start: 2024-09-20

## 2024-10-16 RX ORDER — OXYCODONE HYDROCHLORIDE 5 MG/1
5 TABLET ORAL EVERY 4 HOURS PRN
Status: DISCONTINUED | OUTPATIENT
Start: 2024-10-16 | End: 2024-10-16 | Stop reason: HOSPADM

## 2024-10-16 RX ORDER — IBUPROFEN 600 MG/1
600 TABLET ORAL EVERY 6 HOURS PRN
Qty: 15 TABLET | Refills: 0 | Status: SHIPPED | OUTPATIENT
Start: 2024-10-16

## 2024-10-16 RX ORDER — ONDANSETRON HYDROCHLORIDE 2 MG/ML
INJECTION, SOLUTION INTRAVENOUS AS NEEDED
Status: DISCONTINUED | OUTPATIENT
Start: 2024-10-16 | End: 2024-10-16

## 2024-10-16 RX ORDER — BUPIVACAINE HCL/EPINEPHRINE 0.25-.0005
VIAL (ML) INJECTION AS NEEDED
Status: DISCONTINUED | OUTPATIENT
Start: 2024-10-16 | End: 2024-10-16 | Stop reason: HOSPADM

## 2024-10-16 RX ORDER — ROCURONIUM BROMIDE 10 MG/ML
INJECTION, SOLUTION INTRAVENOUS AS NEEDED
Status: DISCONTINUED | OUTPATIENT
Start: 2024-10-16 | End: 2024-10-16

## 2024-10-16 RX ORDER — HYDROMORPHONE HYDROCHLORIDE 1 MG/ML
INJECTION, SOLUTION INTRAMUSCULAR; INTRAVENOUS; SUBCUTANEOUS CONTINUOUS PRN
Status: DISCONTINUED | OUTPATIENT
Start: 2024-10-16 | End: 2024-10-16

## 2024-10-16 RX ORDER — CELECOXIB 200 MG/1
400 CAPSULE ORAL ONCE
Status: COMPLETED | OUTPATIENT
Start: 2024-10-16 | End: 2024-10-16

## 2024-10-16 RX ORDER — LIDOCAINE HYDROCHLORIDE 20 MG/ML
INJECTION, SOLUTION INFILTRATION; PERINEURAL AS NEEDED
Status: DISCONTINUED | OUTPATIENT
Start: 2024-10-16 | End: 2024-10-16

## 2024-10-16 SDOH — HEALTH STABILITY: MENTAL HEALTH: CURRENT SMOKER: 1

## 2024-10-16 ASSESSMENT — PAIN - FUNCTIONAL ASSESSMENT
PAIN_FUNCTIONAL_ASSESSMENT: 0-10

## 2024-10-16 ASSESSMENT — PAIN SCALES - GENERAL
PAINLEVEL_OUTOF10: 7
PAIN_LEVEL: 0
PAINLEVEL_OUTOF10: 7
PAINLEVEL_OUTOF10: 9
PAINLEVEL_OUTOF10: 8
PAINLEVEL_OUTOF10: 6
PAINLEVEL_OUTOF10: 0 - NO PAIN
PAINLEVEL_OUTOF10: 6

## 2024-10-16 ASSESSMENT — PAIN DESCRIPTION - LOCATION: LOCATION: VAGINA

## 2024-10-16 ASSESSMENT — COLUMBIA-SUICIDE SEVERITY RATING SCALE - C-SSRS
2. HAVE YOU ACTUALLY HAD ANY THOUGHTS OF KILLING YOURSELF?: NO
6. HAVE YOU EVER DONE ANYTHING, STARTED TO DO ANYTHING, OR PREPARED TO DO ANYTHING TO END YOUR LIFE?: NO
1. IN THE PAST MONTH, HAVE YOU WISHED YOU WERE DEAD OR WISHED YOU COULD GO TO SLEEP AND NOT WAKE UP?: NO

## 2024-10-16 NOTE — OP NOTE
Vulvectomy simple, bilateral Vulvar biopsies, fulgeration of vulvar dysplasia (B) Operative Note     Date: 10/16/2024  OR Location: [facility] OR    Name: Amee You, : 1960, Age: 64 y.o., MRN: [id number], Sex: female    Diagnosis  Pre-op Diagnosis      * Vulvar dysplasia [N90.3] Post-op Diagnosis     * Vulvar dysplasia [N90.3]     Procedures  Vulvectomy simple, bilateral Vulvar biopsies, fulgeration of vulvar dysplasia  94657 - AR VULVECTOMY SIMPLE COMPLETE    Anoscopy  01697 - AR ANOSCOPY DX W/COLLJ SPEC BR/WA SPX WHEN PRFRMD      Surgeons   Panel 1:     * Reba Vanegas - Primary  Panel 2:     * Josie Lepe - Primary    Resident/Fellow/Other Assistant:  Surgeons and Role:  Panel 1:     * Anirudh Membreno MD - Fellow    Procedure Summary  Anesthesia: General  ASA: III  Anesthesia Staff: Anesthesiologist: Avani Adams MD  Anesthesia Resident: Edison Giles MD  Estimated Blood Loss: 0mL  Intra-op Medications: * Intraprocedure medication information is unavailable because the case start and end events have not been set *           Anesthesia Record               Intraprocedure I/O Totals          Intake    LR bolus 500.00 mL    Total Intake 500 mL       Output    Est. Blood Loss 50 mL    Total Output 50 mL       Net    Net Volume 450 mL          Specimen:   ID Type Source Tests Collected by Time   1 : Pap ThinPrep CERVIX, SCREENING GYNECOLOGIC CYTOLOGY CONSULTATION Reba Vanegas MD 10/16/2024 0844   2 : Periclitoral lesion, stitch marks 12 o'clock, ink marks periurethral margin Tissue SOFT TISSUE RESECTION SURGICAL PATHOLOGY EXAM Reba Vanegas MD 10/16/2024 0903   3 : Right vulvar biopsy- 7 o'clock Tissue VULVA BIOPSY SURGICAL PATHOLOGY EXAM Reba Vanegas MD 10/16/2024 0917   4 : Left vulvar biopsy- 4 o'clock Tissue VULVA BIOPSY SURGICAL PATHOLOGY EXAM Reba Vanegas MD 10/16/2024 0919        Staff:   Monicaulator: Tere Antunez Person: Luis E  Circulator: Amauri Antunez Person: Rosalva          Drains and/or Catheters: * None in log *    Tourniquet Times:         Implants:     Findings: small areas of dysplasia L posterior, anterior, R lateral (all 3mm or less, treated with fulguration)    Indications: Amee You is an 64 y.o. female who is having surgery for Vulvar dysplasia [N90.3] and anal dysplasia.     The patient was seen in the preoperative area. The risks, benefits, complications, treatment options, non-operative alternatives, expected recovery and outcomes were discussed with the patient. The possibilities of reaction to medication, pulmonary aspiration, injury to surrounding structures, bleeding, recurrent infection, the need for additional procedures, failure to diagnose a condition, and creating a complication requiring transfusion or operation were discussed with the patient. The patient concurred with the proposed plan, giving informed consent.  The site of surgery was properly noted/marked if necessary per policy. The patient has been actively warmed in preoperative area. Preoperative antibiotics are not indicated. Venous thrombosis prophylaxis have been ordered including bilateral sequential compression devices    Procedure Details:   Procedure:   Informed consent was obtained including discussion of risks, benefits, and alternatives. The patient was brought to the operating room and placed supine. Sequential compression devices were placed. Huddle was completed in accordance with hospital procedures. Anesthesia was induced and LMA was placed. The patient was placed in lithotomy with all pressure points padded. The area was prepped and draped in the usual fashion. Time out was completed. The gynecologic oncology team performed their portion of the procedure.     Digital rectal exam was performed. Intersphincteric block  was also performed. A total of 30cc of local anesthesia was utilized.    Externally, the perineal exam was notable for scarring from previous procedures. Lighted  Salbador-Enid anoscope was introduced. The anal canal was examined sequentially. The canal itself was divided into octants and in each octant, the distal rectum, dentate, anal canal, and anal verge was examined. Magnification, acetic acid, and Lugol's were utilized to improve visualization. Areas of abnormality were fulgurated including small areas of dysplasia L posterior, anterior, R lateral (all 3mm or less, treated with fulguration)    The area was checked for hemostasis and found to be adequate. All visible lesions were addressed.     The patient was then returned to supine. Anesthesia was weaned. The patient was transferred to PACU awake and in stable conditions. Counts were reported correct at the end of the procedure. I was present and scrubbed throughout.    Complications:  None; patient tolerated the procedure well.    Disposition: PACU - hemodynamically stable.  Condition: stable         Additional Details: none    Attending Attestation: I was present and scrubbed for the entire procedure.    Josie Santoro MD

## 2024-10-16 NOTE — OP NOTE
Vulvectomy simple, bilateral Vulvar biopsies, fulgeration of vulvar dysplasia (B) Operative Note     Date: 10/16/2024  OR Location: [facility] OR    Name: Amee You, : 1960, Age: 64 y.o., MRN: [id number], Sex: female    Diagnosis  Pre-op Diagnosis      * Vulvar dysplasia [N90.3] Post-op Diagnosis     * Vulvar dysplasia [N90.3]     Procedures  Vulvectomy simple, bilateral Vulvar biopsies, fulgeration of vulvar dysplasia  81158 - PA VULVECTOMY SIMPLE COMPLETE    Anoscopy  15204 - PA ANOSCOPY DX W/COLLJ SPEC BR/WA SPX WHEN PRFRMD      Surgeons   Panel 1:     * Reba Vanegas - Primary  Panel 2:     * Josie Lepe - Primary    Resident/Fellow/Other Assistant:  Surgeons and Role:  Panel 1:     * Anirudh Membreno MD - Fellow    Procedure Summary  Anesthesia: General  ASA: III  Anesthesia Staff: Anesthesiologist: Avani Adams MD  Anesthesia Resident: Edison Giles MD  Estimated Blood Loss: 25mL  Intra-op Medications: * Intraprocedure medication information is unavailable because the case start and end events have not been set *           Anesthesia Record               Intraprocedure I/O Totals          Intake    LR bolus 500.00 mL    Total Intake 500 mL       Output    Est. Blood Loss 50 mL    Total Output 50 mL       Net    Net Volume 450 mL          Specimen:   ID Type Source Tests Collected by Time   1 : Pap ThinPrep CERVIX, SCREENING GYNECOLOGIC CYTOLOGY CONSULTATION Reba Vanegas MD 10/16/2024 0844   2 : Periclitoral lesion, stitch marks 12 o'clock, ink marks periurethral margin Tissue SOFT TISSUE RESECTION SURGICAL PATHOLOGY EXAM Reba Vanegas MD 10/16/2024 0903   3 : Right vulvar biopsy- 7 o'clock Tissue VULVA BIOPSY SURGICAL PATHOLOGY EXAM Reba Vanegas MD 10/16/2024 0917   4 : Left vulvar biopsy- 4 o'clock Tissue VULVA BIOPSY SURGICAL PATHOLOGY EXAM Reba Vanegas MD 10/16/2024 0919        Staff:   Monicaulator: Tere Antunez Person: Luis E  Circulator: Amauri Antunez Person:  Rosalva         Drains and/or Catheters: * None in log *        Findings:  3x3.5cm lesion with thickened irregular appearance at the superior vulva crossing the midline primarily involving the labia minora bilaterally and the epithelium overlying the clitoris.  Lesion within 3mm of the urethral meatus.  1cm surgical margin obtained anteriorly but not possible posteriorly given proximity to urethral meatus.  Two 1x1 cm areas of whitish discoloration and mildly thickened epithelium at mid-inferior labia majora bilaterally 4oclock and 7oclock, punch biopsies obtained.      Indications: Amee You is an 64 y.o. female who is having surgery for Vulvar dysplasia [N90.3].     The patient was seen in the preoperative area. The risks, benefits, complications, treatment options, non-operative alternatives, expected recovery and outcomes were discussed with the patient. The possibilities of reaction to medication, pulmonary aspiration, injury to surrounding structures, bleeding, recurrent infection, the need for additional procedures, failure to diagnose a condition, and creating a complication requiring transfusion or operation were discussed with the patient. The patient concurred with the proposed plan, giving informed consent.  The site of surgery was properly noted/marked if necessary per policy. The patient has been actively warmed in preoperative area. Preoperative antibiotics are not indicated. Venous thrombosis prophylaxis have been ordered including bilateral sequential compression devices    Procedure Details:   The patient was taken to the operating room where she had sequential compression devices placed. A safety check confirming patient's name and MR number was performed. General anesthesia was administered and found to be adequate. The patient was positioned in low lithotomy with use of León stirrups. The perineum and vagina were prepped and draped in normal sterile fashion.  Perineal lesion was marked and  infiltrated with local anesthetic until adequate blanching noted.    Vulvar lesion was marked and excised circumferentially using scapel and bovie.  1cm margin obtained at anterior margin but not possible posteriorly where lesion came close to urethral meatus.  Electrocautery was used to divide the specimen from underlying subcutaneous tissue. Total defect measured 3x3.5 cm. Hemostasis was achieved with the use of electrocautery.  2-0 vicryl used to oversew clitoral vessels.  We then proceeded with closure of the perineal incision. Subcutaneous tissue was reapproximated using 2-0 Vicryl in an interrupted fashion. Skin was reapproximated with a 4-0 monocryl in an interrupted vertical fashion to try to minimize narrowing of the introitus.  We used 6mm punch biopsies to obtain biopsy of suspected dysplastic lesions at 4 and 7oclock.    Dr. Santoro then performed her portion of the procedure.    All sponge, lap and needle counts were correct at the end of the procedure. Overall the patient tolerated procedure well and was taken to PACU in stable condition.        Complications:  None; patient tolerated the procedure well.    Disposition: PACU - hemodynamically stable.  Condition: stable       Anirudh Membreno MD      Attending Attestation:     Reba Vanegas  Phone Number: [phone]

## 2024-10-16 NOTE — ANESTHESIA PROCEDURE NOTES
Peripheral IV  Date/Time: 10/16/2024 8:35 AM      Placement  Needle size: 18 G  Laterality: right  Location: hand  Site prep: alcohol  Technique: anatomical landmarks  Attempts: 1

## 2024-10-16 NOTE — ANESTHESIA PROCEDURE NOTES
Airway  Date/Time: 10/16/2024 8:35 AM  Urgency: elective    Airway not difficult    Staffing  Performed: resident   Authorized by: Avani Adams MD    Performed by: Edison Giles MD  Patient location during procedure: OR    Indications and Patient Condition  Indications for airway management: anesthesia  Spontaneous Ventilation: absent  Sedation level: deep  Preoxygenated: yes  Patient position: sniffing  Mask difficulty assessment: 1 - vent by mask  Planned trial extubation    Final Airway Details  Final airway type: endotracheal airway      Successful airway: ETT     Successful intubation technique: direct laryngoscopy  Facilitating devices/methods: intubating stylet  Endotracheal tube insertion site: oral  Blade: Nidhi  Blade size: #3  ETT size (mm): 7.0  Cormack-Lehane Classification: grade IIa - partial view of glottis  Placement verified by: capnometry   Measured from: lips  ETT to lips (cm): 22  Number of attempts at approach: 1

## 2024-10-16 NOTE — H&P
History Of Present Illness  Amee You is a 64 y.o. female presenting with VIN3, history of moderately differentiated anal SCC. No interval changes since in-office assessment with myself 9/9/24.      Past Medical History  She has a past medical history of Anal dysplasia, Anxiety, Arthritis, Depression, GERD (gastroesophageal reflux disease), thoracic outlet syndrome, Hyperlipidemia, Hypertension, PTSD (post-traumatic stress disorder), Subclavian artery stenosis, left, and Vulvar dysplasia.      Surgical History  She has a past surgical history that includes Other surgical history (2012); Flexible sigmoidoscopy; Knee Arthroplasty (Right); Tubal ligation; and Colonoscopy.    Oncology History    No history exists.       Social History  She reports that she has been smoking cigarettes. She has a 15 pack-year smoking history. She has never used smokeless tobacco. She reports that she does not currently use alcohol. She reports that she does not use drugs.     Allergies  Pregabalin, Sulfa (sulfonamide antibiotics), and Sulfamethoxazole    A comprehensive review of systems was performed and otherwise negative.     Physical Exam  Vitals and nursing note reviewed.   Constitutional:       General: She is not in acute distress.     Appearance: Normal appearance.   HENT:      Head: Normocephalic and atraumatic.      Mouth/Throat:      Mouth: Mucous membranes are moist.      Pharynx: Oropharynx is clear.   Eyes:      Extraocular Movements: Extraocular movements intact.      Conjunctiva/sclera: Conjunctivae normal.      Pupils: Pupils are equal, round, and reactive to light.   Cardiovascular:      Rate and Rhythm: Normal rate and regular rhythm.      Pulses: Normal pulses.   Pulmonary:      Effort: Pulmonary effort is normal.   Abdominal:      General: There is no distension.      Palpations: Abdomen is soft. There is no mass.      Tenderness: There is no abdominal tenderness.   Genitourinary:     Comments:  Deferred  Musculoskeletal:         General: No swelling or tenderness. Normal range of motion.      Cervical back: Normal range of motion and neck supple.   Skin:     General: Skin is warm.      Findings: No rash.   Neurological:      General: No focal deficit present.      Mental Status: She is alert and oriented to person, place, and time.   Psychiatric:         Mood and Affect: Mood normal.         Behavior: Behavior normal.            Assessment/Plan   Assessment & Plan  Vulvar dysplasia      - To OR for WLE, vulvar biopsies  - screening Pap to be collected  - Concurrent HRA/anoscopy with biopsies with Dr Yvan Vanegas MD

## 2024-10-16 NOTE — DISCHARGE INSTRUCTIONS
Vulvar Care after Vulvectomy  1. AVOID TENSION ON THE SUTURES (stretching and pulling) as much as possible until your postoperative visit in the clinic.  This will mean limiting your activities during the healing process.  Get in and out of bed with care.  Avoid sitting as much as is feasible.  2. Perform sitz baths or rinse the area with a hand held shower device two to three times per day using lukewarm water.  Avoid soap and do not rub.  3. IMPORTANT - KEEP THE INCISIONS AS DRY AS POSSIBLE in between sitz baths.  After each sitz bath, blot the area with a towel (do not rub).  You may even use a warm (not hot) blow dryer to further dry the area.    4. Your vulvar/vaginal area will tend to be moist with a light drainage.  Excess moisture may increase risk for infection.  To reduce excess moisture buildup, keep a dry wash cloth between your legs.  Replace as necessary to keep dry. Take measures to keep the inguinal (groin) area dry as well.  5. Avoid constipation and straining with bowel movements.  You may take 100 mg of Colace twice daily as a stool softener (over the counter).  Stronger laxatives can be used as necessary.  Perform your sitz baths or shower rinses preferentially after bowel movements.  6.  If you have questions or experience fevers greater than 101 F degrees, foul-smelling drainage, excessive pain not relieved by prescribed medications, problems with your drain, new leg swelling or leg pain or have any questions after your discharge, call # [phone].  After hours your providers will be contacted via an answering service.

## 2024-10-16 NOTE — ANESTHESIA POSTPROCEDURE EVALUATION
Patient: Amee You    Procedure Summary       Date: 10/16/24 Room / Location: [facility] OR 03 / [facility]    Anesthesia Start: 0818 Anesthesia Stop: 1004    Procedures:       Vulvectomy simple, bilateral Vulvar biopsies, fulgeration of vulvar dysplasia (Bilateral: Vulva)      Anoscopy (Bilateral) Diagnosis:       Vulvar dysplasia      (Vulvar dysplasia [N90.3])    Surgeons: Reba Vanegas MD; Josie Lepe MD Responsible Provider: Avani Adams MD    Anesthesia Type: general ASA Status: 3            Anesthesia Type: general    Vitals Value Taken Time   /72 10/16/24 1000   Temp 36.1 10/16/24 1004   Pulse 61 10/16/24 1001   Resp 25 10/16/24 1001   SpO2 100 % 10/16/24 1001   Vitals shown include unfiled device data.    Anesthesia Post Evaluation    Patient location during evaluation: PACU  Patient participation: complete - patient participated  Level of consciousness: awake  Pain score: 0  Pain management: adequate  Multimodal analgesia pain management approach  Airway patency: patent  Two or more strategies used to mitigate risk of obstructive sleep apnea  Cardiovascular status: acceptable, blood pressure returned to baseline and hemodynamically stable  Respiratory status: acceptable  Hydration status: acceptable  Postoperative Nausea and Vomiting: none        No notable events documented.

## 2024-10-16 NOTE — ANESTHESIA PREPROCEDURE EVALUATION
Patient: Amee You    Procedure Information       Date/Time: 10/16/24 0745    Procedures:       Vulvectomy Simple (Bilateral)      Anoscopy (Bilateral)      Biopsy Anus (Bilateral)    Location: [facility] OR 03 / [facility]    Surgeons: Reba Vanegas MD; Josie Lepe MD            Relevant Problems   Anesthesia (within normal limits)      Cardiac   (+) Hyperlipidemia      Pulmonary (within normal limits)      Neuro   (+) Depression   (+) Moderate episode of recurrent major depressive disorder      GI   (+) Gastroesophageal reflux disease      /Renal (within normal limits)      Liver (within normal limits)      Hematology (within normal limits)      Musculoskeletal   (+) Arthritis of knee, degenerative       Clinical information reviewed:   Tobacco  Allergies  Meds   Med Hx  Surg Hx   Fam Hx  Soc Hx        NPO Detail:  NPO/Void Status  Date of Last Liquid: 10/16/24  Time of Last Liquid: 0400 (sip of water with meds)  Date of Last Solid: 10/15/24  Time of Last Solid: 2030  Last Intake Type: Clear fluids         Physical Exam    Airway  Mallampati: II  TM distance: >3 FB  Neck ROM: limited  Comments: Can bite upper lip   Cardiovascular   Rhythm: regular  Rate: normal     Dental   (+) upper dentures     Pulmonary - normal exam     Abdominal          Anesthesia Plan    History of general anesthesia?: yes  History of complications of general anesthesia?: no    ASA 3     general     The patient is a current smoker.  Patient was previously instructed to abstain from smoking on day of procedure.  Patient smoked on day of procedure.    intravenous and inhalational induction   Postoperative administration of opioids is intended.  Trial extubation is planned.  Anesthetic plan and risks discussed with patient.  Use of blood products discussed with patient who consented to blood products.    Plan discussed with resident and attending.

## 2024-10-21 LAB
CYTOLOGY CMNT CVX/VAG CYTO-IMP: NORMAL
LABORATORY COMMENT REPORT: NORMAL
PATH REPORT.TOTAL CANCER: NORMAL

## 2024-10-23 ENCOUNTER — APPOINTMENT (OUTPATIENT)
Dept: PRIMARY CARE | Facility: CLINIC | Age: 64
End: 2024-10-23
Payer: COMMERCIAL

## 2024-10-28 ENCOUNTER — TELEPHONE (OUTPATIENT)
Dept: GYNECOLOGIC ONCOLOGY | Facility: HOSPITAL | Age: 64
End: 2024-10-28

## 2024-10-28 DIAGNOSIS — K21.9 GASTROESOPHAGEAL REFLUX DISEASE, UNSPECIFIED WHETHER ESOPHAGITIS PRESENT: ICD-10-CM

## 2024-10-28 LAB
LABORATORY COMMENT REPORT: NORMAL
PATH REPORT.FINAL DX SPEC: NORMAL
PATH REPORT.GROSS SPEC: NORMAL
PATH REPORT.RELEVANT HX SPEC: NORMAL
PATH REPORT.TOTAL CANCER: NORMAL
RESIDENT REVIEW: NORMAL

## 2024-10-28 NOTE — TELEPHONE ENCOUNTER
Amee called and asked if someone can go over her pathology results. I sent her results to her physician to review. Amee also has complaints of itching and burning with urination. I advised her that she may need to have a urine culture done.

## 2024-10-29 RX ORDER — PANTOPRAZOLE SODIUM 40 MG/1
TABLET, DELAYED RELEASE ORAL
Qty: 30 TABLET | Refills: 1 | Status: SHIPPED | OUTPATIENT
Start: 2024-10-29

## 2024-10-30 ENCOUNTER — DOCUMENTATION (OUTPATIENT)
Dept: PRIMARY CARE | Facility: CLINIC | Age: 64
End: 2024-10-30

## 2024-10-30 ENCOUNTER — APPOINTMENT (OUTPATIENT)
Dept: PRIMARY CARE | Facility: CLINIC | Age: 64
End: 2024-10-30
Payer: COMMERCIAL

## 2024-10-30 DIAGNOSIS — F43.10 PTSD (POST-TRAUMATIC STRESS DISORDER): Primary | ICD-10-CM

## 2024-10-30 PROCEDURE — 99493 SBSQ PSYC COLLAB CARE MGMT: CPT | Performed by: FAMILY MEDICINE

## 2024-10-30 PROCEDURE — 99494 1ST/SBSQ PSYC COLLAB CARE: CPT | Performed by: FAMILY MEDICINE

## 2024-11-04 ENCOUNTER — TELEPHONE (OUTPATIENT)
Dept: GYNECOLOGIC ONCOLOGY | Facility: HOSPITAL | Age: 64
End: 2024-11-04
Payer: COMMERCIAL

## 2024-11-04 DIAGNOSIS — R30.0 DYSURIA: ICD-10-CM

## 2024-11-04 NOTE — TELEPHONE ENCOUNTER
Patient called to report vaginal pressure that started on 11/1/24. Pressure worsens with sitting and standing. S/p simple vulvectomy on 10/16/24.   Patient denies vaginal drainage, discharge bleeding.   Denies increase pain, bleeding/drainage from vulvar incision.     + urinary frequency, dysuria, sharp vaginal pain with voids.    + BM.   Afebrile.    Message routed to Dr. Vanegas and Nancy Patricio CNP.     1604  Phoned patient to notify that Nancy recommends UA/culture to rule out infection.   Urine order pended to Nancy Patricio CNP.   Patient states she will go to  lab tomorrow.   Advised patient to call office back or proceed to ER with worsening pain/symptoms.   Patient verbalized her understanding of information given.    11/7/24  1417   Phoned patient in follow up to [employer] message sent requesting results of UA/culture and updating regarding continued symptoms.   Patient states she continues with vaginal and perineal pressure, denies incision drainage/pain, denies fever.  Denies dysuria.   Message routed to Dr. Vanegas and NP s  to update.  Advised patient to continue to call office back with worsening symptoms and to keep appointment as scheduled with Dr. Vanegas on 11/11/24.

## 2024-11-05 ENCOUNTER — LAB (OUTPATIENT)
Dept: LAB | Facility: LAB | Age: 64
End: 2024-11-05
Payer: COMMERCIAL

## 2024-11-05 DIAGNOSIS — R30.0 DYSURIA: ICD-10-CM

## 2024-11-05 LAB
APPEARANCE UR: CLEAR
BILIRUB UR STRIP.AUTO-MCNC: NEGATIVE MG/DL
COLOR UR: ABNORMAL
GLUCOSE UR STRIP.AUTO-MCNC: NORMAL MG/DL
KETONES UR STRIP.AUTO-MCNC: NEGATIVE MG/DL
LEUKOCYTE ESTERASE UR QL STRIP.AUTO: ABNORMAL
NITRITE UR QL STRIP.AUTO: NEGATIVE
PH UR STRIP.AUTO: 5.5 [PH]
PROT UR STRIP.AUTO-MCNC: NEGATIVE MG/DL
RBC # UR STRIP.AUTO: ABNORMAL /UL
RBC #/AREA URNS AUTO: ABNORMAL /HPF
SP GR UR STRIP.AUTO: 1.02
SQUAMOUS #/AREA URNS AUTO: ABNORMAL /HPF
UROBILINOGEN UR STRIP.AUTO-MCNC: NORMAL MG/DL
WBC #/AREA URNS AUTO: ABNORMAL /HPF

## 2024-11-05 PROCEDURE — 81001 URINALYSIS AUTO W/SCOPE: CPT

## 2024-11-05 PROCEDURE — 87086 URINE CULTURE/COLONY COUNT: CPT

## 2024-11-06 LAB
BACTERIA UR CULT: NORMAL
HOLD SPECIMEN: NORMAL

## 2024-11-11 ENCOUNTER — OFFICE VISIT (OUTPATIENT)
Dept: GYNECOLOGIC ONCOLOGY | Facility: HOSPITAL | Age: 64
End: 2024-11-11
Payer: COMMERCIAL

## 2024-11-11 VITALS
SYSTOLIC BLOOD PRESSURE: 129 MMHG | WEIGHT: 139.77 LBS | HEART RATE: 62 BPM | TEMPERATURE: 97 F | DIASTOLIC BLOOD PRESSURE: 85 MMHG | BODY MASS INDEX: 28.23 KG/M2

## 2024-11-11 DIAGNOSIS — N90.3 VULVAR DYSPLASIA: Primary | ICD-10-CM

## 2024-11-11 PROCEDURE — 99211 OFF/OP EST MAY X REQ PHY/QHP: CPT | Performed by: STUDENT IN AN ORGANIZED HEALTH CARE EDUCATION/TRAINING PROGRAM

## 2024-11-11 PROCEDURE — 4004F PT TOBACCO SCREEN RCVD TLK: CPT | Performed by: STUDENT IN AN ORGANIZED HEALTH CARE EDUCATION/TRAINING PROGRAM

## 2024-11-11 RX ORDER — ACETAMINOPHEN 325 MG/1
975 TABLET ORAL ONCE
OUTPATIENT
Start: 2024-11-11 | End: 2024-11-11

## 2024-11-11 RX ORDER — GABAPENTIN 600 MG/1
600 TABLET ORAL ONCE
OUTPATIENT
Start: 2024-11-11 | End: 2024-11-11

## 2024-11-11 RX ORDER — CELECOXIB 400 MG/1
400 CAPSULE ORAL ONCE
OUTPATIENT
Start: 2024-11-11 | End: 2024-11-11

## 2024-11-11 ASSESSMENT — PAIN SCALES - GENERAL: PAINLEVEL_OUTOF10: 0-NO PAIN

## 2024-11-11 NOTE — PROGRESS NOTES
Patient ID: Amee You is a 64 y.o. female.  Referring Physician: No referring provider defined for this encounter.  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 Reports overall doing well since surgery however increasingly bothersome vaginal bulge x1-2 weeks. No abnormal vaginal bleeding/discharge. Urinary frequency with leakage that is not new per patient. Vulvar irritation improved since surgery with topical emollients, not using Sitz baths. Regular stools without pain or blood. 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 Denies nausea/vomiting, fevers, chills, chest pain or shortness of breath. Occasional left groin pain as well as chronic lower back pain for which she is receiving steroid injections with some improvement. Continues to smoke 1PPD. No other concerns; denies recent weight changes.      A comprehensive review of systems was performed and otherwise negative.       Objective    BSA: 1.62 meters squared  /85 (BP Location: Right arm, Patient Position: Sitting, BP Cuff Size: Adult)   Pulse 62   Temp 36.1 °C (97 °F)   Wt 63.4 kg (139 lb 12.4 oz)   BMI 28.23 kg/m²      Physical Exam  Vitals and nursing note reviewed. Exam conducted with a chaperone present.   Constitutional:       General: She is not in acute distress.     Appearance: Normal appearance.   HENT:      Head: Normocephalic and atraumatic.      Mouth/Throat:      Mouth: Mucous membranes are moist.      Pharynx: No oropharyngeal exudate or posterior oropharyngeal erythema.   Eyes:      Extraocular Movements: Extraocular movements intact.      Conjunctiva/sclera: Conjunctivae normal.      Pupils: Pupils are equal, round, and reactive to light.   Neck:      Thyroid: No thyroid  mass or thyromegaly.   Cardiovascular:      Rate and Rhythm: Normal rate and regular rhythm.      Pulses: Normal pulses.      Heart sounds: Normal heart sounds.   Pulmonary:      Effort: Pulmonary effort is normal.      Breath sounds: Wheezing and rhonchi present. No rales.   Abdominal:      General: Bowel sounds are normal. There is no distension.      Palpations: Abdomen is soft. There is no mass.      Tenderness: There is no abdominal tenderness.      Hernia: No hernia is present.   Genitourinary:     Labia:         Right: Lesion present.         Left: Lesion present.       Vagina: Normal.          Comments: Superior periclitoral incision healing with exposed suture noted. Biopsy site changes on posterior vulva with perianal condyloma. Grossly normal vaginal mucosa without lesions noted. Posterior aspect of cervix without grossly visible lesions. No vaginal masses on examination.   Musculoskeletal:         General: No tenderness. Normal range of motion.      Cervical back: Normal range of motion and neck supple. No tenderness.      Right lower leg: No edema.      Left lower leg: No edema.   Lymphadenopathy:      Lower Body: No right inguinal adenopathy. No left inguinal adenopathy.   Skin:     General: Skin is warm.      Findings: No lesion or rash.   Neurological:      General: No focal deficit present.      Mental Status: She is alert and oriented to person, place, and time.   Psychiatric:         Mood and Affect: Mood normal.         Behavior: Behavior normal.     Pathology: 10/16/24  A. PERICLITORAL LESION, STITCH AT 12:00, RESECTION:   -- HIGH GRADE SQUAMOUS INTRAEPITHELIAL LESION (HATTIE 3, USUAL AND WARTY TYPES)  --MARGIN OF RESECTION FROM 4-9 O'CLOCK IS POSITIVE  --PERIURETHRAL MARGIN NEGATIVE  B.  RIGHT VULVAR BIOPSY 7:00:  -- HIGH GRADE SQUAMOUS INTRAEPITHELIAL LESION (HATTIE 3, USUAL AND WARTY TYPE)   C.  LEFT VULVAR BIOPSY 4:00:   -- HIGH GRADE SQUAMOUS INTRAEPITHELIAL LESION (HATTIE 3, USUAL TYPE).     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 Medical comorbidities: COPD with 1PPD tobacco use, cardiomyopathy with thoracic outlet syndrome. ECOG 1.     Diagnoses and all orders for this visit:  Vulvar dysplasia  - s/p WLE +margins, grossly residual dysplasia on posterior fourchette with short perineal body. Recommend management with CO2 laser ablation of the vulva, concurrent cervical conization due to LSIL Pap obtained with last procedure.   - She is aware of risk of bleeding, infection, scarring, thromboembolic complications, incisional complications and injury to adjacent organs and structures, including the urethra, blood vessels, sexual structures and nerves, need for additional procedures and risks inherent to anesthesia including possible death. Postoperative recovery was reviewed, including the risk of venous thrombosis, bowel adhesions, ileus and incisional hernia formation.   - The patient was given time to ask pertinent questions and would like to proceed with procedures as indicated. Other alternatives including nonsurgical options reviewed with patients. All questions were answered to patient's satisfaction.  - Surgical consents reviewed and signed in office.  [ ] PAT  [ ] OR date pending with Dr Santoro; tentatively 10/16  -     Case Request Operating Room: Vulvectomy Simple, Anoscopy, Biopsy Anus; Standing  -     Request for Pre-Admission Testing Visit; Future  LSIL Pap  - Concurrent LEEP/CKC with examination under anesthesia      Reba Vanegas MD

## 2024-11-20 ENCOUNTER — APPOINTMENT (OUTPATIENT)
Dept: PRIMARY CARE | Facility: CLINIC | Age: 64
End: 2024-11-20
Payer: COMMERCIAL

## 2024-11-20 NOTE — PROGRESS NOTES
Collaborative Care ([facility])  Progress Note    Type of Interaction: In Office    Start Time: 1p    End Time: 2p        Appointment: Scheduled    Reason for Visit:   Chief Complaint   Patient presents with    PTSD (Post-Traumatic Stress Disorder)          Interval History / Patient Symptoms: Patient has been active with volunteering and enjoys getting the time out of the home.  Patient and BHM reviewed the strengths list. Patient reports she has seen an increase her self esteem. Patient states she is paying more attention to her outward appearance. Patient has noticed she is moving and walking more. Patient is feeling better being out of the  house. Patient states when she gets home she feels overwhelmed by everything that still needs to be taken care of. Patient recognizes that her communication with her spouse can be negative and has decided not to engage if she needs to. Patient works on practicing gratitude. Patient is working on getting more independent and making connections with the people she volunteers with. Patient is working on organizing at her home. Patient would like to focus on more craft and being more artistic which she enjoys doing.    No data recorded      Interventions Provided: [place] Setting, Behavioral Activation, Develop Coping Strategies, and Review Progress/Goals Stress Management      Progress Made: Mixed    Response to Intervention: Patient has gotten out of the home more. Patient enjoys slowly regaining her independence and will look for more opportunities.               Follow Up / Next Appointment:    12/11/2024@1p

## 2024-11-22 ENCOUNTER — APPOINTMENT (OUTPATIENT)
Dept: SURGERY | Facility: CLINIC | Age: 64
End: 2024-11-22
Payer: COMMERCIAL

## 2024-11-22 ENCOUNTER — DOCUMENTATION (OUTPATIENT)
Dept: PRIMARY CARE | Facility: CLINIC | Age: 64
End: 2024-11-22
Payer: COMMERCIAL

## 2024-11-22 DIAGNOSIS — F43.10 PTSD (POST-TRAUMATIC STRESS DISORDER): Primary | ICD-10-CM

## 2024-12-05 DIAGNOSIS — E78.5 HYPERLIPIDEMIA, UNSPECIFIED HYPERLIPIDEMIA TYPE: ICD-10-CM

## 2024-12-05 RX ORDER — ATORVASTATIN CALCIUM 40 MG/1
40 TABLET, FILM COATED ORAL NIGHTLY
Qty: 90 TABLET | Refills: 0 | Status: SHIPPED | OUTPATIENT
Start: 2024-12-05

## 2024-12-06 ENCOUNTER — APPOINTMENT (OUTPATIENT)
Dept: GASTROENTEROLOGY | Facility: HOSPITAL | Age: 64
End: 2024-12-06
Payer: COMMERCIAL

## 2024-12-11 ENCOUNTER — APPOINTMENT (OUTPATIENT)
Dept: PRIMARY CARE | Facility: CLINIC | Age: 64
End: 2024-12-11
Payer: COMMERCIAL

## 2024-12-11 NOTE — PROGRESS NOTES
[facility]  Progress Note    Type of Interaction: Phone    Start Time: 1:10p    End Time: 2p        Appointment: Scheduled, by phone was to be in person    Reason for Visit:   Chief Complaint   Patient presents with    PTSD (Post-Traumatic Stress Disorder)          Interval History / Patient Symptoms: Patient overslept and appt switched to a phone session. Patient reports not having a good week recently. Patient and BHM processed her feelings together.  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 Patient and M processed the incident together. [facility] worked with patient to discuss how could the situation have been handled differently on her end.  Patient has been sad since she has not been able to get out of the house and go to volunteer. Patient is not sleeping well. Patient reports she takes her meds and has no desire to much, patient lacks motivation to do anything. Patient lacks energy to do things. Patient doesn't feel like her 'old' self. Patient is taking Lexapro 20mg and wellbutrin 150 mg and trazadone. [facility] sent message to patients [facility]. Patient has been looking around at other activities to get her out of the home. Patient reports that her home environment and caring for the dogs keeps her awake and she is not falling asleep until 2a or 3a.     No data recorded      Interventions Provided: Behavioral Activation, Develop Coping Strategies, and Review Progress/Goals Stress Management      Progress Made: Mixed    Response to Intervention: Patient is open and engaging. Patient to look to find friends on a friend keisha [employer] to find friends with interests like she has. Patient to find activities that she can engage in outside of her home, reach out to other volunteers that she recently met.              Follow Up / Next Appointment:    1/8/2024@12:15p

## 2024-12-18 ENCOUNTER — TELEPHONE (OUTPATIENT)
Dept: PRIMARY CARE | Facility: CLINIC | Age: 64
End: 2024-12-18
Payer: COMMERCIAL

## 2024-12-18 NOTE — TELEPHONE ENCOUNTER
Spoke with pt, informed of message below. Verbalized understanding. Transferred her to front staff to set up appt.    ----- Message from Dari Negro sent at 12/17/2024  2:20 PM EST -----  Can you let this pt know that I recommend an appointment (can be virtual) to discuss her medications as her psychologist reports that her symptoms are not improving.  JL  ----- Message -----  From: ABELINO Salomon-S  Sent: 12/11/2024   1:42 PM EST  To: Dari Negro MD    Hi,    I am speaking with this patient.  She reports no relief of her depression on the Lexapro 20mg and she went back to the Wellbutrin 150mg, patient did not feel the Wellbutrin XL 300mg was working for her. I went back to Dr. Berry's note and see he recommended adding topiramate if she did not see any relief of her symptoms of depression.    Thank you    Misty

## 2024-12-27 ENCOUNTER — DOCUMENTATION (OUTPATIENT)
Dept: PRIMARY CARE | Facility: CLINIC | Age: 64
End: 2024-12-27
Payer: COMMERCIAL

## 2024-12-27 DIAGNOSIS — F43.10 PTSD (POST-TRAUMATIC STRESS DISORDER): Primary | ICD-10-CM

## 2025-01-06 PROBLEM — M54.9 CHRONIC MID BACK PAIN: Status: ACTIVE | Noted: 2024-03-06

## 2025-01-06 PROBLEM — G89.4 CHRONIC PAIN SYNDROME: Status: ACTIVE | Noted: 2024-03-06

## 2025-01-06 PROBLEM — I10 ESSENTIAL HYPERTENSION: Status: ACTIVE | Noted: 2019-06-20

## 2025-01-06 PROBLEM — M47.817 LUMBOSACRAL SPONDYLOSIS: Status: ACTIVE | Noted: 2024-03-06

## 2025-01-06 PROBLEM — G89.29 CHRONIC MID BACK PAIN: Status: ACTIVE | Noted: 2024-03-06

## 2025-01-06 PROBLEM — G47.00 INSOMNIA: Status: ACTIVE | Noted: 2024-10-01

## 2025-01-06 PROBLEM — J44.1 ACUTE EXACERBATION OF COPD WITH ASTHMA: Status: ACTIVE | Noted: 2019-05-25

## 2025-01-06 PROBLEM — M47.814 THORACIC SPONDYLOSIS: Status: ACTIVE | Noted: 2024-03-06

## 2025-01-06 PROBLEM — Z86.59 HISTORY OF DEPRESSION: Status: ACTIVE | Noted: 2025-01-06

## 2025-01-06 PROBLEM — L98.9 INFLAMMATORY DERMATOSIS: Status: ACTIVE | Noted: 2025-01-06

## 2025-01-06 PROBLEM — K62.9 ANAL LESION: Status: ACTIVE | Noted: 2019-05-25

## 2025-01-06 PROBLEM — I50.9 CONGESTIVE HEART FAILURE: Status: ACTIVE | Noted: 2019-05-25

## 2025-01-06 PROBLEM — F45.42 PAIN DISORDER ASSOCIATED WITH PSYCHOLOGICAL AND PHYSICAL FACTORS: Status: RESOLVED | Noted: 2023-06-30 | Resolved: 2025-01-06

## 2025-01-06 PROBLEM — R91.1 INCIDENTAL PULMONARY NODULE: Status: ACTIVE | Noted: 2019-05-25

## 2025-01-06 RX ORDER — CLONIDINE HYDROCHLORIDE 0.1 MG/1
1 TABLET ORAL AS NEEDED
COMMUNITY
Start: 2024-02-26

## 2025-01-06 RX ORDER — ACETAMINOPHEN 500 MG
TABLET ORAL
COMMUNITY
Start: 2024-10-16 | End: 2025-01-08 | Stop reason: WASHOUT

## 2025-01-06 RX ORDER — FLUOXETINE 10 MG/1
10 CAPSULE ORAL DAILY
COMMUNITY
Start: 2024-02-13 | End: 2025-01-08 | Stop reason: WASHOUT

## 2025-01-06 RX ORDER — GABAPENTIN 100 MG/1
1 CAPSULE ORAL
COMMUNITY
Start: 2024-02-13 | End: 2025-01-08 | Stop reason: WASHOUT

## 2025-01-06 RX ORDER — SENNOSIDES 25 MG/1
TABLET, FILM COATED ORAL
COMMUNITY
Start: 2023-02-02 | End: 2025-01-08 | Stop reason: WASHOUT

## 2025-01-06 RX ORDER — POLYETHYLENE GLYCOL 3350 17 G/17G
POWDER, FOR SOLUTION ORAL
COMMUNITY
Start: 2024-08-21 | End: 2025-01-08 | Stop reason: WASHOUT

## 2025-01-06 RX ORDER — BUPROPION HYDROCHLORIDE 150 MG/1
1 TABLET, EXTENDED RELEASE ORAL
COMMUNITY
Start: 2024-11-27 | End: 2025-01-08 | Stop reason: SDUPTHER

## 2025-01-07 ENCOUNTER — APPOINTMENT (OUTPATIENT)
Dept: PREADMISSION TESTING | Facility: HOSPITAL | Age: 65
End: 2025-01-07
Payer: COMMERCIAL

## 2025-01-08 ENCOUNTER — OFFICE VISIT (OUTPATIENT)
Dept: PRIMARY CARE | Facility: CLINIC | Age: 65
End: 2025-01-08
Payer: COMMERCIAL

## 2025-01-08 ENCOUNTER — APPOINTMENT (OUTPATIENT)
Dept: PRIMARY CARE | Facility: CLINIC | Age: 65
End: 2025-01-08
Payer: COMMERCIAL

## 2025-01-08 VITALS
DIASTOLIC BLOOD PRESSURE: 62 MMHG | SYSTOLIC BLOOD PRESSURE: 94 MMHG | BODY MASS INDEX: 28.67 KG/M2 | HEIGHT: 59 IN | WEIGHT: 142.2 LBS | HEART RATE: 70 BPM | TEMPERATURE: 97.9 F | OXYGEN SATURATION: 96 %

## 2025-01-08 DIAGNOSIS — F32.A DEPRESSIVE DISORDER: Primary | ICD-10-CM

## 2025-01-08 DIAGNOSIS — F33.1 MODERATE EPISODE OF RECURRENT MAJOR DEPRESSIVE DISORDER: ICD-10-CM

## 2025-01-08 PROBLEM — J44.1 ACUTE EXACERBATION OF COPD WITH ASTHMA: Status: RESOLVED | Noted: 2019-05-25 | Resolved: 2025-01-08

## 2025-01-08 PROBLEM — Z00.00 HEALTH MAINTENANCE EXAMINATION: Status: RESOLVED | Noted: 2024-04-04 | Resolved: 2025-01-08

## 2025-01-08 PROCEDURE — 3008F BODY MASS INDEX DOCD: CPT | Performed by: FAMILY MEDICINE

## 2025-01-08 PROCEDURE — 3078F DIAST BP <80 MM HG: CPT | Performed by: FAMILY MEDICINE

## 2025-01-08 PROCEDURE — 3074F SYST BP LT 130 MM HG: CPT | Performed by: FAMILY MEDICINE

## 2025-01-08 PROCEDURE — 99214 OFFICE O/P EST MOD 30 MIN: CPT | Performed by: FAMILY MEDICINE

## 2025-01-08 RX ORDER — TOPIRAMATE 25 MG/1
25 TABLET ORAL 2 TIMES DAILY
Qty: 60 TABLET | Refills: 5 | Status: SHIPPED | OUTPATIENT
Start: 2025-01-08 | End: 2025-07-07

## 2025-01-08 RX ORDER — BUPROPION HYDROCHLORIDE 150 MG/1
150 TABLET, EXTENDED RELEASE ORAL 3 TIMES DAILY
Qty: 160 TABLET | Refills: 1 | Status: SHIPPED | OUTPATIENT
Start: 2025-01-08

## 2025-01-08 ASSESSMENT — PATIENT HEALTH QUESTIONNAIRE - PHQ9
9. THOUGHTS THAT YOU WOULD BE BETTER OFF DEAD, OR OF HURTING YOURSELF: NOT AT ALL
SUM OF ALL RESPONSES TO PHQ QUESTIONS 1-9: 18
SUM OF ALL RESPONSES TO PHQ9 QUESTIONS 1 AND 2: 6
8. MOVING OR SPEAKING SO SLOWLY THAT OTHER PEOPLE COULD HAVE NOTICED. OR THE OPPOSITE, BEING SO FIGETY OR RESTLESS THAT YOU HAVE BEEN MOVING AROUND A LOT MORE THAN USUAL: NOT AT ALL
1. LITTLE INTEREST OR PLEASURE IN DOING THINGS: NEARLY EVERY DAY
6. FEELING BAD ABOUT YOURSELF - OR THAT YOU ARE A FAILURE OR HAVE LET YOURSELF OR YOUR FAMILY DOWN: NOT AT ALL
3. TROUBLE FALLING OR STAYING ASLEEP OR SLEEPING TOO MUCH: NEARLY EVERY DAY
7. TROUBLE CONCENTRATING ON THINGS, SUCH AS READING THE NEWSPAPER OR WATCHING TELEVISION: NEARLY EVERY DAY
2. FEELING DOWN, DEPRESSED OR HOPELESS: NEARLY EVERY DAY
5. POOR APPETITE OR OVEREATING: NEARLY EVERY DAY
10. IF YOU CHECKED OFF ANY PROBLEMS, HOW DIFFICULT HAVE THESE PROBLEMS MADE IT FOR YOU TO DO YOUR WORK, TAKE CARE OF THINGS AT HOME, OR GET ALONG WITH OTHER PEOPLE: EXTREMELY DIFFICULT
4. FEELING TIRED OR HAVING LITTLE ENERGY: NEARLY EVERY DAY

## 2025-01-08 ASSESSMENT — ENCOUNTER SYMPTOMS
OCCASIONAL FEELINGS OF UNSTEADINESS: 1
DEPRESSION: 1
LOSS OF SENSATION IN FEET: 0

## 2025-01-08 NOTE — PROGRESS NOTES
"Subjective   Patient ID: Amee You is a 64 y.o. female who presents for Discuss meds.    HPI   Presents to discuss anxiety and depression.  Feels that symptoms are not controlled with current regimen.  Hard to focus, tearful.  Does have some improvement in symptoms.  No SI/HI.      Review of Systems  Negative unless noted in HPI    Objective   BP 94/62 (BP Location: Right arm, Patient Position: Sitting, BP Cuff Size: Adult)   Pulse 70   Temp 36.6 °C (97.9 °F) (Temporal)   Ht 1.499 m (4' 11\")   Wt 64.5 kg (142 lb 3.2 oz)   SpO2 96%   BMI 28.72 kg/m²     Physical Exam  Constitutional:       Appearance: Normal appearance.   Neurological:      Mental Status: She is alert and oriented to person, place, and time. Mental status is at baseline.   Psychiatric:         Mood and Affect: Mood normal.         Behavior: Behavior normal.         Thought Content: Thought content normal.         Judgment: Judgment normal.         Assessment/Plan   Problem List Items Addressed This Visit             ICD-10-CM    Depressive disorder - Primary F32.A    Relevant Medications    buPROPion SR (Wellbutrin SR) 150 mg 12 hr tablet    topiramate (Topamax) 25 mg tablet    Moderate episode of recurrent major depressive disorder F33.1     Recommend changing Lexapro to nighttime dosing  Increase Wellbutrin to TID (pt reports this used to be previous regimen that was more effective)  Will add Topamax based psychiatry recommendation for augmentation  Start with 25mg BID, can increase by 50mg weekly with max/target of 200-400mg  Did address that Seroquel is another option  Continue with counseling as well to help with symptoms               "

## 2025-01-08 NOTE — ASSESSMENT & PLAN NOTE
Recommend changing Lexapro to nighttime dosing  Increase Wellbutrin to TID (pt reports this used to be previous regimen that was more effective)  Will add Topamax based psychiatry recommendation for augmentation  Start with 25mg BID, can increase by 50mg weekly with max/target of 200-400mg  Did address that Seroquel is another option  Continue with counseling as well to help with symptoms

## 2025-01-08 NOTE — PROGRESS NOTES
Collaborative Care ([facility])  Progress Note    Type of Interaction: In Office    Start Time: 12:10p    End Time: 1:10p        Appointment: Scheduled    Reason for Visit:   Chief Complaint   Patient presents with    PTSD (Post-Traumatic Stress Disorder)          Interval History / Patient Symptoms: Patient reports her holiday was not what she would have liked it to be. Patient has discord within her family and this makes it difficult to enjoy the holidays. Patient saw her PCP today and her meds will be increased for her depressed symptoms. Patient is working towards taking care of her physical needs, she is working on stopping smoking.  Patient has kept in contact with several staff she volunteered with recently.  Patient has been looking for other volunteer activities or part-time job opportunities. Patient has been thinking about going back to AA meetings and attending [employer].  Patient is having a hard time focusing and being centered. Patient would like to work somewhere where she can give back. Patient has been trying to work on doing     Patient Health Questionnaire-9 Score: 18 (1/8/2025 11:32 AM)        Interventions Provided: [place] Setting, Behavioral Activation, Develop Coping Strategies, Review Progress/Goals Stress Management, and Mindfulness      Progress Made: Mixed    Response to Intervention: patient is open and engaging. Patient is reflective. Patient recognizes that the lack of support she receives from family members                  Follow Up / Next Appointment:    1/30/2025@[facility]

## 2025-01-09 ENCOUNTER — APPOINTMENT (OUTPATIENT)
Dept: SURGERY | Facility: CLINIC | Age: 65
End: 2025-01-09
Payer: COMMERCIAL

## 2025-01-09 ENCOUNTER — CLINICAL SUPPORT (OUTPATIENT)
Dept: PREADMISSION TESTING | Facility: HOSPITAL | Age: 65
End: 2025-01-09
Payer: COMMERCIAL

## 2025-01-09 NOTE — CPM/PAT H&P
CPM/PAT Evaluation       Name: Amee You (Amee You)  /Age: 1960/64 y.o.     {[facility] Visit Type:24713}      Chief Complaint: ***    HPI    Past Medical History:   Diagnosis Date    Anal dysplasia     Seen by Dr. Josie Lepe on 24    Anxiety     Arthritis     Cardiomyopathy 06/15/2015    Depression     GERD (gastroesophageal reflux disease)     History of substance abuse (Multi)     Hx of thoracic outlet syndrome     Hyperlipidemia 2012    Hypertension     Insomnia     Lumbosacral spondylosis 2024    Moderate episode of recurrent major depressive disorder     Obesity     PTSD (post-traumatic stress disorder)     Snoring     Subclavian artery stenosis, left     s/p Carotid subclavian bypass in     Thoracic outlet syndrome     Vulvar dysplasia     Seen by Dr. Reba Vanegas on 24       Past Surgical History:   Procedure Laterality Date    ANOSCOPY  2010    BIOPSY  2006    BIOPSY VULVA    COLONOSCOPY      FLEXIBLE SIGMOIDOSCOPY  2016    FLEXIBLE SIGMOIDOSCOPY  2014    KNEE ARTHROPLASTY Right     OTHER SURGICAL HISTORY  2012    Carotid subclavian bypass    OTHER SURGICAL HISTORY  10/10/2011    Examination under anesthesia, high-resolution anoscopy, biopsy of four condyloma acuminata from the anal canal and destruction of circumferential anal and perianal condyloma acuminata using a combination of infrared coagulation and electrocautery    TUBAL LIGATION         Patient  reports never being sexually active.    Family History   Problem Relation Name Age of Onset    Ovarian cancer Mother Mom     Stroke Mother Mom     Arthritis Mother Mom     Cancer Mother Mom     Heart attack Father Dad     Hypertension Father Dad     Cancer Father Dad     Heart disease Father Dad     Brain cancer Brother      Breast cancer Maternal Grandmother Grandpa     Diabetes Paternal Grandmother Grandma     Hearing loss Paternal Grandfather Grandpa      "Alcohol abuse Brother Ronald     Cancer Brother Ronald     Cancer Mother's Brother Reagan     COPD Sister Vitaly        Allergies   Allergen Reactions    Pregabalin Rash     open skin leisions    Sulfamethoxazole Fever, Rash and Swelling       Prior to Admission medications    Medication Sig Start Date End Date Taking? Authorizing Provider   aspirin 81 mg EC tablet Take 1 tablet (81 mg) by mouth once daily. 6/29/21   Historical Provider, MD   atorvastatin (Lipitor) 40 mg tablet Take 1 tablet (40 mg) by mouth once daily at bedtime. 12/5/24   Dari Negro MD   buPROPion SR (Wellbutrin SR) 150 mg 12 hr tablet Take 1 tablet (150 mg) by mouth 3 times a day. 1/8/25   Dari Negro MD   carvedilol (Coreg) 6.25 mg tablet TAKE 2 TABLETS(12.5 MG) BY MOUTH TWICE DAILY 9/3/24   Dari Negro MD   cloNIDine (Catapres) 0.1 mg tablet Take 1 tablet (0.1 mg) by mouth if needed. 2/26/24   Historical Provider, MD   escitalopram (Lexapro) 20 mg tablet TAKE 1 TABLET(20 MG) BY MOUTH DAILY 9/4/24   Dari Negro MD   furosemide (Lasix) 40 mg tablet Take 1 tablet (40 mg) by mouth if needed (swelling and \"water retention\"). 10/16/19   Historical Provider, MD   gabapentin (Neurontin) 300 mg capsule Take 1 capsule (300 mg) by mouth 2 times a day. 9/20/24   Historical Provider, MD   hydrOXYzine HCL (Atarax) 10 mg tablet Take by mouth if needed for itching.    Historical Provider, MD   ibuprofen 600 mg tablet Take 1 tablet (600 mg) by mouth every 6 hours if needed for mild pain (1 - 3). 10/16/24   Anirudh Membreno MD   meloxicam (Mobic) 15 mg tablet if needed. 2/13/24   Historical Provider, MD   pantoprazole (ProtoNix) 40 mg EC tablet TAKE 1 TABLET(40 MG) BY MOUTH DAILY. DO NOT CRUSH, CHEW, OR SPLIT 10/29/24   Dari Negro MD   ramipril (Altace) 10 mg capsule TAKE 1 CAPSULE(10 MG) BY MOUTH DAILY 7/29/24   Dari Negro MD   topiramate (Topamax) 25 mg tablet Take 1 tablet (25 mg) by mouth 2 times a day. 1/8/25 7/7/25  Dari Negro MD   traZODone (Desyrel) 50 mg " tablet Take 1 tablet (50 mg) by mouth once daily at bedtime.    Historical Provider, MD   acetaminophen (Tylenol) 325 mg tablet if needed. 3/10/23 1/6/25  Historical Provider, MD   acetaminophen (Tylenol) 500 mg tablet  10/16/24 1/8/25  Historical Provider, MD   buPROPion SR (Wellbutrin SR) 150 mg 12 hr tablet Take 1 tablet (150 mg) by mouth every 12 hours. 11/27/24 1/8/25  Historical Provider, MD   buPROPion XL (Wellbutrin XL) 300 mg 24 hr tablet TAKE 1 TABLET(300 MG) BY MOUTH DAILY IN THE MORNING. DO NOT CRUSH, CHEW, OR SPLIT  Patient not taking: Reported on 1/8/2025 9/23/24 1/8/25  Dari Negro MD   FLUoxetine (PROzac) 10 mg capsule Take 1 capsule (10 mg) by mouth once daily.  Patient not taking: Reported on 1/8/2025 2/13/24 1/8/25  Historical Provider, MD   gabapentin (Neurontin) 100 mg capsule Take 1 capsule (100 mg) by mouth every 12 hours.  Patient not taking: Reported on 1/8/2025 2/13/24 1/8/25  Historical Provider, MD   lidocaine (Xylocaine) 5 % cream cream Lidocaine (Anorectal) 5 % External Cream USE AS DIRECTED. Quantity: 1 Refills: 2 Ordered: 2-Feb-2023 Josie Lepe MD Start : 2-Feb-2023 Active  Patient not taking: Reported on 1/8/2025 2/2/23 1/8/25  Historical Provider, MD   lidocaine-hydrocortisone-aloe 3-2.5 % (7 gram) kit Insert into the rectum. Takes as needed after anal surgeries  Patient not taking: Reported on 1/8/2025 7/16/21 1/8/25  Historical Provider, MD   oxyCODONE (Roxicodone) 5 mg immediate release tablet Take 1 tablet (5 mg) by mouth every 6 hours if needed for moderate pain (4 - 6) or severe pain (7 - 10).  Patient not taking: Reported on 1/8/2025 10/16/24 1/8/25  Anirudh Membreno MD   polyethylene glycol (Glycolax, Miralax) 17 gram/dose powder TAKE 238 GRAMS BY MOUTH 1 TIME FOR 1 DOSE. USE AS DIRECTED BY  ENDOSCOPY DEPARTMENT FOR COLONOSCOPY  Patient not taking: Mix of powder and drink. Reported on 1/8/2025 8/21/24 1/8/25  Historical Provider, MD BLAS ODONNELL  Physical Exam     Airway    Testing/Diagnostic:   Nuclear Stress 10/11/24  IMPRESSION:  1. No evidence of inducible myocardial ischemia or prior infarction.  2. The left ventricle is normal in size.  3. Normal LV wall motion with a post-stress LV EF estimated at  greater than 65%.    ECG 10/8/24  Normal sinus rhythm  Low voltage QRS  Cannot rule out Anteroseptal infarct , age undetermined  Abnormal ECG  When compared with ECG of 08-FEB-2023 14:08,  Nonspecific T wave abnormality now evident in Anterior leads  Nonspecific T wave abnormality no longer evident in Lateral leads  Confirmed by Lisha Velazco ([id number]) on 10/10/2024 8:23:23 AM     Echo 10/11/24  CONCLUSIONS:  1. Left ventricular ejection fraction is normal, calculated by Kumar's biplane at 69%.  2. There is normal right ventricular global systolic function.  3. Right ventricular systolic pressure is within normal limits.     Carotid 02/21/13 (media)  IMPRESSION   RIGHT SIDE   Common carotid artery: Patent.   Internal carotid artery: 0-19% stenosis.   External carotid artery: Patent.   Vertebral artery: Patent and antegrade flow noted.   Innominate artery: Patent.   Subclavian artery: Patent.     LEFT SIDE   Common carotid artery: Patent.   Internal carotid artery: 20-39% stenosis.   External carotid artery: Patent.   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   Subclavian artery:   Widely patent CCA to subclavian artery bypass graft.        Patient Specialist/PCP:   Data only, no medication, providers, or history verified. Information updated based solely on chart review.      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 Preop clearance eval.    Update 10/14/24 -   Patient had no significant CV symptoms except dyspnea on exertion which may be related to her smoking  -Her echo and stress test are normal  -Low CV risk from surgery from cardiac standpoint     Gastrointestinal  8/21/24 - Neftali HUANG WESLEY Barker-CNP   GERD, Chronic diarrhea, bloating, esophageal dysphagia, poor appetite         Visit Vitals  OB Status Unknown   Smoking Status Every Day       DASI Risk Score      Flowsheet Row Pre-Admission Testing from 10/4/2024 in [facility]   Can you take care of yourself (eat, dress, bathe, or use toilet)?  2.75 filed at 10/04/2024 1414   Can you walk indoors, such as around your house? 1.75 filed at 10/04/2024 1414   Can you walk a block or two on level ground?  2.75 filed at 10/04/2024 1414   Can you climb a flight of stairs or walk up a hill? 5.5 filed at 10/04/2024 1414   Can you run a short distance? 8 filed at 10/04/2024 1414   Can you do light work around the house like dusting or washing dishes? 2.7 filed at 10/04/2024 1414   Can you do moderate work around the house like vacuuming, sweeping floors or carrying groceries? 3.5 filed at 10/04/2024 1414   Can you do heavy work around the house like scrubbing floors or lifting and moving heavy furniture?  8 filed at 10/04/2024 1414   Can you do yard work like raking leaves, weeding or pushing a mower? 4.5 filed at 10/04/2024 1414   Can you have sexual relations? 5.25 filed at 10/04/2024 1414   Can you participate in moderate recreational activities like golf, bowling, dancing, doubles tennis or throwing a baseball or football? 6 filed at 10/04/2024 1414   Can you participate in strenous sports like swimming, singles tennis, football, basketball, or skiing? 7.5 filed at 10/04/2024 1414   DASI SCORE 58.2 filed at 10/04/2024 1414   METS Score (Will be calculated only when all the questions  are answered) 9.9 filed at 10/04/2024 1414          Caprini DVT Assessment      Flowsheet Row Pre-Admission Testing from 10/4/2024 in [facility]   DVT Score (IF A SCORE IS NOT CALCULATING, MUST SELECT A BMI TO COMPLETE) 7 filed at 10/04/2024 1429   Medical Factors COPD filed at 10/04/2024 1429   Surgical Factors Major surgery planned, lasting 2-3 hours filed at 10/04/2024 1429   BMI (BMI MUST BE CHOSEN) 30 or less filed at 10/04/2024 1429          Modified Frailty Index      Flowsheet Row Pre-Admission Testing from 10/4/2024 in [facility]   Non-independent functional status (problems with dressing, bathing, personal grooming, or cooking) 0 filed at 10/04/2024 1430   History of diabetes mellitus  0 filed at 10/04/2024 1430   History of COPD 0.0909 filed at 10/04/2024 1430   History of CHF 0.0909 filed at 10/04/2024 1430   History of MI 0 filed at 10/04/2024 1430   History of Percutaneous Coronary Intervention, Cardiac Surgery, or Angina No filed at 10/04/2024 1430   Hypertension requiring the use of medication  0.0909 filed at 10/04/2024 1430   Peripheral vascular disease 0.0909 filed at 10/04/2024 1430   Impaired sensorium (cognitive impairement or loss, clouding, or delirium) 0 filed at 10/04/2024 1430   TIA or CVA withouy residual deficit 0 filed at 10/04/2024 1430   Cerebrovascular accident with deficit 0 filed at 10/04/2024 1430   Modified Frailty Index Calculator .3636 filed at 10/04/2024 1430          CHADS2 Stroke Risk  Current as of 2 hours ago        N/A 3 to 100%: High Risk   2 to < 3%: Medium Risk   0 to < 2%: Low Risk     Last Change: N/A          This score determines the patient's risk of having a stroke if the patient has atrial fibrillation.        This score is not applicable to this patient. Components are not calculated.          Revised Cardiac Risk Index      Flowsheet Row Pre-Admission Testing from 10/4/2024 in [facility]   High-Risk Surgery  (Intraperitoneal, Intrathoracic,Suprainguinal vascular) 0 filed at 10/04/2024 1429   History of ischemic heart disease (History of MI, History of positive exercuse test, Current chest paint considered due to myocardial ischemia, Use of nitrate therapy, ECG with pathological Q Waves) 1 filed at 10/04/2024 1429   History of congestive heart failure (pulmonary edemia, bilateral rales or S3 gallop, Paroxysmal nocturnal dyspnea, CXR showing pulmonary vascular redistribution) 0 filed at 10/04/2024 1429   History of cerebrovascular disease (Prior TIA or stroke) 0 filed at 10/04/2024 1429   Pre-operative insulin treatment 0 filed at 10/04/2024 1429   Pre-operative creatinine>2 mg/dl 0 filed at 10/04/2024 1429   Revised Cardiac Risk Calculator 1 filed at 10/04/2024 1429          Apfel Simplified Score      Flowsheet Row Pre-Admission Testing from 10/4/2024 in [facility]   Smoking status 0 filed at 10/04/2024 1430   History of motion sickness or PONV  0 filed at 10/04/2024 1430   Use of postoperative opioids 1 filed at 10/04/2024 1430   Gender - Female 1=Yes filed at 10/04/2024 1430   Apfel Simplified Score Calculator 2 filed at 10/04/2024 1430          Risk Analysis Index Results This Encounter    No data found in the last 10 encounters.       Stop Bang Score      Flowsheet Row Pre-Admission Testing from 10/4/2024 in [facility]   Do you snore loudly? 0 filed at 10/04/2024 1414   Do you often feel tired or fatigued after your sleep? 0 filed at 10/04/2024 1414   Has anyone ever observed you stop breathing in your sleep? 0 filed at 10/04/2024 1414   Do you have or are you being treated for high blood pressure? 1 filed at 10/04/2024 1414   Recent BMI (Calculated) 28.9 filed at 10/04/2024 1414   Is BMI greater than 35 kg/m2? 0=No filed at 10/04/2024 1414   Age older than 50 years old? 1=Yes filed at 10/04/2024 1414   Is your neck circumference greater than 17 inches (Male) or 16 inches (Female)?  0 filed at 10/04/2024 1414   Gender - Male 0=No filed at 10/04/2024 1414   STOP-BANG Total Score 2 filed at 10/04/2024 1414          Prodigy: High Risk  Total Score: 15              Prodigy Age Score      Prodigy CHF score          ARISCAT Score for Postoperative Pulmonary Complications    No data to display       [place] Perioperative Risk for Myocardial Infarction or Cardiac Arrest (LYNETTE)    No data to display         Assessment and Plan:     {[facility] EMBEDDED ASSESSMENT AND PLAN:11133}

## 2025-01-13 ENCOUNTER — PRE-ADMISSION TESTING (OUTPATIENT)
Dept: PREADMISSION TESTING | Facility: HOSPITAL | Age: 65
End: 2025-01-13
Payer: COMMERCIAL

## 2025-01-13 VITALS
OXYGEN SATURATION: 98 % | DIASTOLIC BLOOD PRESSURE: 66 MMHG | SYSTOLIC BLOOD PRESSURE: 103 MMHG | HEIGHT: 59 IN | WEIGHT: 143 LBS | TEMPERATURE: 97.3 F | BODY MASS INDEX: 28.83 KG/M2 | HEART RATE: 66 BPM

## 2025-01-13 DIAGNOSIS — Z01.818 PREOPERATIVE EXAMINATION: Primary | ICD-10-CM

## 2025-01-13 DIAGNOSIS — N90.3 VULVAR DYSPLASIA: ICD-10-CM

## 2025-01-13 LAB
ANION GAP SERPL CALC-SCNC: 13 MMOL/L (ref 10–20)
BUN SERPL-MCNC: 13 MG/DL (ref 6–23)
CALCIUM SERPL-MCNC: 9.5 MG/DL (ref 8.6–10.6)
CHLORIDE SERPL-SCNC: 102 MMOL/L (ref 98–107)
CO2 SERPL-SCNC: 26 MMOL/L (ref 21–32)
CREAT SERPL-MCNC: 0.73 MG/DL (ref 0.5–1.05)
EGFRCR SERPLBLD CKD-EPI 2021: >90 ML/MIN/1.73M*2
ERYTHROCYTE [DISTWIDTH] IN BLOOD BY AUTOMATED COUNT: 13.4 % (ref 11.5–14.5)
GLUCOSE SERPL-MCNC: 73 MG/DL (ref 74–99)
HCT VFR BLD AUTO: 44.3 % (ref 36–46)
HGB BLD-MCNC: 14.9 G/DL (ref 12–16)
MCH RBC QN AUTO: 31.1 PG (ref 26–34)
MCHC RBC AUTO-ENTMCNC: 33.6 G/DL (ref 32–36)
MCV RBC AUTO: 93 FL (ref 80–100)
NRBC BLD-RTO: 0 /100 WBCS (ref 0–0)
PLATELET # BLD AUTO: 226 X10*3/UL (ref 150–450)
POTASSIUM SERPL-SCNC: 4.4 MMOL/L (ref 3.5–5.3)
RBC # BLD AUTO: 4.79 X10*6/UL (ref 4–5.2)
SODIUM SERPL-SCNC: 137 MMOL/L (ref 136–145)
WBC # BLD AUTO: 9.1 X10*3/UL (ref 4.4–11.3)

## 2025-01-13 PROCEDURE — 85027 COMPLETE CBC AUTOMATED: CPT

## 2025-01-13 PROCEDURE — 36415 COLL VENOUS BLD VENIPUNCTURE: CPT

## 2025-01-13 PROCEDURE — 82374 ASSAY BLOOD CARBON DIOXIDE: CPT

## 2025-01-13 PROCEDURE — 99203 OFFICE O/P NEW LOW 30 MIN: CPT

## 2025-01-13 ASSESSMENT — ENCOUNTER SYMPTOMS
LIGHT-HEADEDNESS: 0
ABDOMINAL PAIN: 0
CONFUSION: 0
DOUBLE VISION: 0
EXCESSIVE BLEEDING: 0
WEAKNESS: 0
UNEXPECTED WEIGHT CHANGE: 0
PALPITATIONS: 0
CONSTIPATION: 0
SINUS CONGESTION: 0
POLYDIPSIA: 0
FEVER: 0
VISUAL CHANGE: 0
TREMORS: 0
DYSPNEA AT REST: 0
HEMOPTYSIS: 0
NUMBNESS: 0
DIFFICULTY URINATING: 0
NECK SWELLING: 0
NERVOUS/ANXIOUS: 0
LIMITED RANGE OF MOTION: 0
VOICE CHANGE: 0
WHEEZING: 0
DYSPNEA WITH EXERTION: 0
WOUND: 0
RHINORRHEA: 0
EYE DISCHARGE: 0
DYSURIA: 0
CHILLS: 0
SKIN CHANGES: 0
NECK PAIN: 0
VERTIGO: 0
NECK MASS: 0
DIARRHEA: 0
JOINT SWELLING: 0
NAUSEA: 0
SHORTNESS OF BREATH: 0
COUGH: 0
MYALGIAS: 0
ARTHRALGIAS: 0
BLOOD IN STOOL: 0
NECK STIFFNESS: 0
BRUISES/BLEEDS EASILY: 0
TROUBLE SWALLOWING: 0
ABDOMINAL DISTENTION: 0
VOMITING: 0
PND: 0

## 2025-01-13 ASSESSMENT — DUKE ACTIVITY SCORE INDEX (DASI)
CAN YOU DO YARD WORK LIKE RAKING LEAVES, WEEDING OR PUSHING A MOWER: NO
CAN YOU WALK A BLOCK OR TWO ON LEVEL GROUND: YES
CAN YOU DO HEAVY WORK AROUND THE HOUSE LIKE SCRUBBING FLOORS OR LIFTING AND MOVING HEAVY FURNITURE: NO
CAN YOU PARTICIPATE IN STRENOUS SPORTS LIKE SWIMMING, SINGLES TENNIS, FOOTBALL, BASKETBALL, OR SKIING: NO
TOTAL_SCORE: 32.95
DASI METS SCORE: 6.8
CAN YOU TAKE CARE OF YOURSELF (EAT, DRESS, BATHE, OR USE TOILET): YES
CAN YOU PARTICIPATE IN MODERATE RECREATIONAL ACTIVITIES LIKE GOLF, BOWLING, DANCING, DOUBLES TENNIS OR THROWING A BASEBALL OR FOOTBALL: YES
CAN YOU CLIMB A FLIGHT OF STAIRS OR WALK UP A HILL: YES
CAN YOU DO LIGHT WORK AROUND THE HOUSE LIKE DUSTING OR WASHING DISHES: YES
CAN YOU HAVE SEXUAL RELATIONS: NO
CAN YOU WALK INDOORS, SUCH AS AROUND YOUR HOUSE: YES
CAN YOU DO MODERATE WORK AROUND THE HOUSE LIKE VACUUMING, SWEEPING FLOORS OR CARRYING GROCERIES: YES
CAN YOU RUN A SHORT DISTANCE: YES

## 2025-01-13 ASSESSMENT — LIFESTYLE VARIABLES: SMOKING_STATUS: SMOKER

## 2025-01-13 NOTE — PREPROCEDURE INSTRUCTIONS
Thank you for visiting [facility] ([facility]) today for your pre-procedure evaluation, you were seen by     Maile Piedra PA-C   Department of Anesthesiology and Perioperative Medicine  Main phone [phone]  Fax [phone]      This summary includes instructions and information to aid you during your perioperative period.  Please read carefully. If you have any questions about your visit today, please call the number listed above.  If you become ill or have any changes to your health before your surgery, please contact your primary care provider and alert your surgeon.    Preparing for Surgery       Preoperative Fasting Guidelines    Why must I stop eating and drinking near surgery time?  With sedation, food or liquid in your stomach can enter your lungs causing serious complications  Food can increase nausea and vomiting  When do I need to stop eating and drinking before my surgery?  Do not eat any food after midnight the night before your surgery/procedure.  You may have up to 13.5 ounces of clear liquid until TWO hours before your instructed arrival time to the hospital.  This includes water, black tea/coffee, (no milk or cream) apple juice, and electrolyte drinks (Gatorade)  You may chew gum until TWO hours before your surgery/procedure    Tobacco and Alcohol;  Do not drink alcohol or smoke within 24 hours of surgery.  It is best to quit smoking for as long as possible before any surgery or procedure.      The Week before Surgery        Seven days before Surgery  Check your CPM medication instructions  Do the exercises provided to you by [facility]   Arrange for a responsible, adult licensed  to take you home after surgery and stay with you for 24 hours.  You will not be permitted to drive yourself home if you have received any anesthetic/sedation  Five days before surgery  Check your CPM medication instructions  Do the exercises provided to you by [facility]   Start using Chlorhexidene  (CHG) body wash if prescribed (Continue till day of surgery)      The Day before Surgery       Check your CPM medication and all other CPM instructions including when to stop eating and drinking  You will be called with your arrival time for surgery in the late afternoon.  If you do not receive a call please reach out to your surgeon's office.  Do not smoke or drink 24 hours before surgery  Prepare items to bring with you to the hospital  Shower with your chlorhexidine wash if prescribed  Brush your teeth and use your chlorhexidine dental rinse if prescribed    The Day of Surgery       Check your CPM medication instructions  Ensure you follow the instructions for when to stop eating and drinking  Shower, if prescribed use CHG.  Do not apply any lotions, creams, moisturizers, perfume or deodorant  Brush your teeth and use your CHG dental rinse if prescribed  Wear loose comfortable clothing  Avoid make-up  Remove  jewelry and piercings, consider professional piercing removal with a plastic spacer if needed  Bring photo ID and Insurance card  Bring an accurate medication list that includes medication dose, frequency and allergies  Bring a copy of your advanced directives (will, health care power of )  Bring any devices and controllers as well as medical devices you have been provided with for surgery (CPAP, slings, braces, etc.)  Dentures, eyeglasses, and contacts will be removed before surgery, please bring cases for contacts or glasses    Preoperative Deep Breathing Exercises    Why it is important to do deep breathing exercises before my surgery?  Deep breathing exercises strengthen your breathing muscles.  This helps you to recover after your surgery and decreases the chance of breathing complications.    How are the deep breathing exercises done?  Sit straight with your back supported.  Breathe in deeply and slowly through your nose. Your lower rib cage should expand and your abdomen may move  forward.  Hold that breath for 3 to 5 seconds.  Breathe out through pursed lips, slowly and completely.  Rest and repeat 10 times every hour while awake.  Rest longer if you become dizzy or lightheaded.      Preoperative Brain Exercises    What are brain exercises?  A brain exercise is any activity that engages your thinking (cognitive) skills.    What types of activities are considered brain exercises?  Jigsaw puzzles, crossword puzzles, word jumble, memory games, word search, and many more.  Many can be found free online or on your phone via a mobile keisha.    Why should I do brain exercises before my surgery?  More recent research has shown brain exercise before surgery can lower the risk of postoperative delirium (confusion) which can be especially important for older adults.  Patients who did brain exercises for 5 to 10 hours the days before surgery, cut their risk of postoperative delirium in half up to 1 week after surgery.    Sit-to-Stand Exercise    What is the sit-to-stand exercise?  The sit-to-stand exercise strengthens the muscles of your lower body and muscles in the center of your body (core muscles for stability) helping to maintain and improve your strength and mobility.  How do I do the sit-to-stand exercise?  The goal is to do this exercise without using your arms or hands.  If this is too difficult, use your arms and hands or a chair with armrests to help slowly push yourself to the standing position and lower yourself back to the sitting position. As the movement becomes easier use your arms and hands less.    Steps to the sit-to-stand exercise  Sit up tall in a sturdy chair, knees bent, feet flat on the floor shoulder-width apart.  Shift your hips/pelvis forward in the chair to correctly position yourself for the next movement.  Lean forward at your hips.  Stand up straight putting equal weight on both feet.  Check to be sure you are properly aligned with the chair, in a slow controlled movement  sit back down.  Repeat this exercise 10-15 times.  If needed you can do it fewer times until your strength improves.  Rest for 1 minute.  Do another 10-15 sit-to-stand exercises.  Try to do this in the morning and evening.       Simple things you can do to help prevent blood clots     Blood clots are blockages that can form in the body's veins. When a blood clot forms in your deep veins, it may be called a deep vein thrombosis, or DVT for short. Blood clots can happen in any part of the body where blood flows, but they are most common in the arms and legs. If a piece of a blood clot breaks free and travels to the lungs, it is called a pulmonary embolus (PE). A PE can be a very serious problem.      Being in the hospital or having surgery can raise your chances of getting a blood clot because you may not be well enough to move around as much as you normally do.         Ways you can help prevent blood clots in the hospital       Wearing SCDs  SCDs stands for Sequential Compression Devices.   SCDs are special sleeves that wrap around your legs. They attach to a pump that fills them with air to gently squeeze your legs every few minutes.  This helps return the blood in your legs to your heart.   SCDs should only be taken off when walking or bathing. SCDs may not be comfortable, but they can help save your life.              Pump SCD leg sleeves  Wearing compression stockings - if your doctor orders them. These special snug-fitting stockings gently squeeze your legs to help blood flow.       Walking. Walking helps move the blood in your legs.   If your doctor says it is ok, try walking the halls at least   5 times a day. Ask us to help you get up, so you don't fall.      Taking any blood-thinning medicines your doctor orders.              Ways you can help prevent blood clots at home         Wearing compression stockings - if your doctor orders them.   Walking - to help move the blood in your legs.    Taking any  blood-thinning medicines your doctor orders.      Signs of a blood clot or PE    Tell your doctor or nurse right away if you have any of the problems listed below.         If you are at home, seek medical care right away. Call 911 for chest pain or problems breathing.            Signs of a blood clot (DVT) - such as pain, swelling, redness, or warmth in your arm or legs.  Signs of a pulmonary embolism (PE) - such as chest pain or feeling short of breath

## 2025-01-13 NOTE — CPM/PAT H&P
CPM/PAT Evaluation       Name: Amee You (Amee You)  /Age: 1960/64 y.o.     Visit Type:   In-Person       Chief Complaint: Perioperative Evaluation    HPI HPI: 65 y/o female scheduled for Examination under anesthesia, CO2 laser ablation of vulvar lesions, cervical conization with endocervical curettage(psb) on 2025  with Dr. Reba Vanegas secondary to Vulvar dysplasia.   Presents to CPM today for perioperative risk stratification and optimization. PMHX includes Depression, H/o Substance Use Disorder, Insomnia, PTSD, HLD, CHF, HTN, cardiomyopathy, Incidental pulmonary nodule, Tobacco Use Disorder, GERD, IBS, Vulvar dysplasia, SCC of anus (AIN II), Chronic Pain Syndrome, Lumbosacral sponylosis.    PCP: Dari Negro MD  Specialists: Cardiologist - [facility] (last seen ** who is retired)  Pain management - [facility] (for chronic back pain)   Oncology - Reba Vanegas MD         Past Medical History:   Diagnosis Date    Anal dysplasia     Seen by Dr. Josie Lepe on 24    Anxiety     Arthritis     hands, knees    Cardiomyopathy 06/15/2015    CHF (congestive heart failure)     Depression     GERD (gastroesophageal reflux disease)     controlled    History of substance abuse (Multi)     Hx of thoracic outlet syndrome     Hyperlipidemia 2012    Hypertension     Insomnia     Lumbosacral spondylosis 2024    Moderate episode of recurrent major depressive disorder     Obesity     PTSD (post-traumatic stress disorder)     Snoring     Subclavian artery stenosis, left     s/p Carotid subclavian bypass in     Thoracic outlet syndrome     Vulvar dysplasia     Seen by Dr. Reba Vanegas on 24       Past Surgical History:   Procedure Laterality Date    ANOSCOPY  2010    BIOPSY  2006    BIOPSY VULVA    COLONOSCOPY      FLEXIBLE SIGMOIDOSCOPY  2016    FLEXIBLE SIGMOIDOSCOPY  2014    KNEE ARTHROPLASTY Right     OTHER SURGICAL HISTORY   "11/17/2012    Carotid subclavian bypass    OTHER SURGICAL HISTORY  10/10/2011    Examination under anesthesia, high-resolution anoscopy, biopsy of four condyloma acuminata from the anal canal and destruction of circumferential anal and perianal condyloma acuminata using a combination of infrared coagulation and electrocautery    TUBAL LIGATION  1994       Patient  reports never being sexually active.    Family History   Problem Relation Name Age of Onset    Ovarian cancer Mother Mom     Stroke Mother Mom     Arthritis Mother Mom     Cancer Mother Mom     Heart attack Father Dad     Hypertension Father Dad     Cancer Father Dad     Heart disease Father Dad     Brain cancer Brother      Breast cancer Maternal Grandmother Grandpa     Diabetes Paternal Grandmother Grandma     Hearing loss Paternal Grandfather Grandpa     Alcohol abuse Brother Ronald     Cancer Brother Ronald     Cancer Mother's Brother Reagan     COPD Sister Vitaly        Allergies   Allergen Reactions    Pregabalin Rash     open skin leisions    Sulfamethoxazole Fever, Rash and Swelling       Prior to Admission medications    Medication Sig Start Date End Date Taking? Authorizing Provider   aspirin 81 mg EC tablet Take 1 tablet (81 mg) by mouth once daily. 6/29/21   Historical Provider, MD   atorvastatin (Lipitor) 40 mg tablet Take 1 tablet (40 mg) by mouth once daily at bedtime. 12/5/24   Dari Negro MD   buPROPion SR (Wellbutrin SR) 150 mg 12 hr tablet Take 1 tablet (150 mg) by mouth 3 times a day. 1/8/25   Dari Negro MD   carvedilol (Coreg) 6.25 mg tablet TAKE 2 TABLETS(12.5 MG) BY MOUTH TWICE DAILY 9/3/24   Dari Negro MD   cloNIDine (Catapres) 0.1 mg tablet Take 1 tablet (0.1 mg) by mouth if needed. 2/26/24   Historical Provider, MD   escitalopram (Lexapro) 20 mg tablet TAKE 1 TABLET(20 MG) BY MOUTH DAILY 9/4/24   Dari Negro MD   furosemide (Lasix) 40 mg tablet Take 1 tablet (40 mg) by mouth if needed (swelling and \"water retention\"). 10/16/19   " Historical Provider, MD   gabapentin (Neurontin) 300 mg capsule Take 1 capsule (300 mg) by mouth 2 times a day. 9/20/24   Historical Provider, MD   hydrOXYzine HCL (Atarax) 10 mg tablet Take by mouth if needed for itching.    Historical Provider, MD   ibuprofen 600 mg tablet Take 1 tablet (600 mg) by mouth every 6 hours if needed for mild pain (1 - 3). 10/16/24   Anirudh Membreno MD   meloxicam (Mobic) 15 mg tablet if needed. 2/13/24   Historical Provider, MD   pantoprazole (ProtoNix) 40 mg EC tablet TAKE 1 TABLET(40 MG) BY MOUTH DAILY. DO NOT CRUSH, CHEW, OR SPLIT 10/29/24   Dari Negro MD   ramipril (Altace) 10 mg capsule TAKE 1 CAPSULE(10 MG) BY MOUTH DAILY 7/29/24   Dari Negro MD   topiramate (Topamax) 25 mg tablet Take 1 tablet (25 mg) by mouth 2 times a day. 1/8/25 7/7/25  Dari Negro MD   traZODone (Desyrel) 50 mg tablet Take 1 tablet (50 mg) by mouth once daily at bedtime.    Historical Provider, MD   acetaminophen (Tylenol) 500 mg tablet  10/16/24 1/8/25  Historical Provider, MD   buPROPion SR (Wellbutrin SR) 150 mg 12 hr tablet Take 1 tablet (150 mg) by mouth every 12 hours. 11/27/24 1/8/25  Historical Provider, MD   buPROPion XL (Wellbutrin XL) 300 mg 24 hr tablet TAKE 1 TABLET(300 MG) BY MOUTH DAILY IN THE MORNING. DO NOT CRUSH, CHEW, OR SPLIT  Patient not taking: Reported on 1/8/2025 9/23/24 1/8/25  Dari Negro MD   FLUoxetine (PROzac) 10 mg capsule Take 1 capsule (10 mg) by mouth once daily.  Patient not taking: Reported on 1/8/2025 2/13/24 1/8/25  Historical Provider, MD   gabapentin (Neurontin) 100 mg capsule Take 1 capsule (100 mg) by mouth every 12 hours.  Patient not taking: Reported on 1/8/2025 2/13/24 1/8/25  Historical Provider, MD   lidocaine (Xylocaine) 5 % cream cream Lidocaine (Anorectal) 5 % External Cream USE AS DIRECTED. Quantity: 1 Refills: 2 Ordered: 2-Feb-2023 Josie Lepe MD Start : 2-Feb-2023 Active  Patient not taking: Reported on 1/8/2025 2/2/23 1/8/25  Historical  Provider, MD   lidocaine-hydrocortisone-aloe 3-2.5 % (7 gram) kit Insert into the rectum. Takes as needed after anal surgeries  Patient not taking: Reported on 1/8/2025 7/16/21 1/8/25  Historical Provider, MD   oxyCODONE (Roxicodone) 5 mg immediate release tablet Take 1 tablet (5 mg) by mouth every 6 hours if needed for moderate pain (4 - 6) or severe pain (7 - 10).  Patient not taking: Reported on 1/8/2025 10/16/24 1/8/25  Anirudh Membreno MD   polyethylene glycol (Glycolax, Miralax) 17 gram/dose powder TAKE 238 GRAMS BY MOUTH 1 TIME FOR 1 DOSE. USE AS DIRECTED BY  ENDOSCOPY DEPARTMENT FOR COLONOSCOPY  Patient not taking: Mix of powder and drink. Reported on 1/8/2025 8/21/24 1/8/25  Historical Provider, MD ODONNELL ROS:   Constitutional:    no fever   no chills   no unexpected weight change  Neuro/Psych:    no numbness   no weakness   no light-headedness   no tremors   no confusion   not nervous/anxious   no self-injury   no suicidal ideation  Eyes:    no discharge   no vision loss   no diplopia   no visual disturbance   use of corrective lenses (contact lens wearer.)  Ears:    no ear pain   no hearing loss   no vertigo   no tinnitus   no hearing aides  Nose:    no nasal discharge   no sinus congestion   no epistaxis  Mouth:    no dental issues   no mouth pain   no oral bleeding   no mouth lesions  Throat:    no throat pain   no dysphagia   no voice change  Neck:    no neck pain   neck swelling   no neck stiffness   no neck masses  Cardio:    no chest pain   no palpitations   no peripheral edema   no dyspnea   no RING   no paroxysmal nocturnal dyspnea  Respiratory:    no cough   no wheezing   no hemoptysis   no shortness of breath  Endocrine:    no cold intolerance   no heat intolerance   no polydipsia  GI:    no abdominal distention   no abdominal pain   no constipation   no diarrhea   no nausea   no vomiting   no blood in stool  :    no difficulty urinating   no dysuria   no oliguria   no  polyuria  Musculoskeletal:    no arthralgias   no myalgias   no decreased ROM   no swelling  Hematologic:    does not bruise/bleed easily   no excessive bleeding   no history of blood transfusion   no blood clots  Skin:   no skin changes   no sores/wound   no rash      Physical Exam  Vitals reviewed. Physical exam within normal limits.   Constitutional:       General: She is not in acute distress.     Appearance: Normal appearance. She is normal weight. She is not ill-appearing, toxic-appearing or diaphoretic.   HENT:      Head: Normocephalic.      Nose: Nose normal. No congestion or rhinorrhea.      Mouth/Throat:      Mouth: Mucous membranes are moist.      Pharynx: Oropharynx is clear. No oropharyngeal exudate or posterior oropharyngeal erythema.   Eyes:      General: No scleral icterus.        Right eye: No discharge.         Left eye: No discharge.      Conjunctiva/sclera: Conjunctivae normal.   Neck:      Vascular: No carotid bruit.   Cardiovascular:      Rate and Rhythm: Normal rate and regular rhythm.      Pulses: Normal pulses.      Heart sounds: Normal heart sounds. No murmur heard.     No friction rub. No gallop.   Pulmonary:      Effort: Pulmonary effort is normal. No respiratory distress.      Breath sounds: Normal breath sounds. No stridor. No wheezing, rhonchi or rales.   Chest:      Chest wall: No tenderness.   Musculoskeletal:         General: No swelling or tenderness. Normal range of motion.      Cervical back: Normal range of motion and neck supple. No rigidity or tenderness.   Skin:     General: Skin is warm and dry.   Neurological:      General: No focal deficit present.      Mental Status: She is alert.      Motor: No weakness.   Psychiatric:         Mood and Affect: Mood normal.         Behavior: Behavior normal.         Thought Content: Thought content normal.         Judgment: Judgment normal.          PAT AIRWAY:   Airway:     Mallampati::  III    TM distance::  >3 FB    Neck ROM::  Full    "Lower implants, upper dentures   upper dentures and implants        Visit Vitals  /66   Pulse 66   Temp 36.3 °C (97.3 °F)   Ht 1.499 m (4' 11\")   Wt 64.9 kg (143 lb)   SpO2 98%   BMI 28.88 kg/m²   OB Status Unknown   Smoking Status Former   BSA 1.64 m²       DASI Risk Score      Flowsheet Row Pre-Admission Testing from 1/13/2025 in [facility] Pre-Admission Testing from 10/4/2024 in [facility]   Can you take care of yourself (eat, dress, bathe, or use toilet)?  2.75 filed at 01/13/2025 1449 2.75 filed at 10/04/2024 1414   Can you walk indoors, such as around your house? 1.75 filed at 01/13/2025 1449 1.75 filed at 10/04/2024 1414   Can you walk a block or two on level ground?  2.75 filed at 01/13/2025 1449 2.75 filed at 10/04/2024 1414   Can you climb a flight of stairs or walk up a hill? 5.5 filed at 01/13/2025 1449 5.5 filed at 10/04/2024 1414   Can you run a short distance? 8 filed at 01/13/2025 1449 8 filed at 10/04/2024 1414   Can you do light work around the house like dusting or washing dishes? 2.7 filed at 01/13/2025 1449 2.7 filed at 10/04/2024 1414   Can you do moderate work around the house like vacuuming, sweeping floors or carrying groceries? 3.5 filed at 01/13/2025 1449 3.5 filed at 10/04/2024 1414   Can you do heavy work around the house like scrubbing floors or lifting and moving heavy furniture?  0 filed at 01/13/2025 1449 8 filed at 10/04/2024 1414   Can you do yard work like raking leaves, weeding or pushing a mower? 0 filed at 01/13/2025 1449 4.5 filed at 10/04/2024 1414   Can you have sexual relations? 0 filed at 01/13/2025 1449 5.25 filed at 10/04/2024 1414   Can you participate in moderate recreational activities like golf, bowling, dancing, doubles tennis or throwing a baseball or football? 6 filed at 01/13/2025 1449 6 filed at 10/04/2024 1414   Can you participate in strenous sports like swimming, singles tennis, football, basketball, or skiing? 0 filed " at 01/13/2025 1449 7.5 filed at 10/04/2024 1414   DASI SCORE 32.95 filed at 01/13/2025 1449 58.2 filed at 10/04/2024 1414   METS Score (Will be calculated only when all the questions are answered) 6.8 filed at 01/13/2025 1449 9.9 filed at 10/04/2024 1414          Caprini DVT Assessment      Flowsheet Row Pre-Admission Testing from 1/13/2025 in [facility] Pre-Admission Testing from 10/4/2024 in [facility]   DVT Score (IF A SCORE IS NOT CALCULATING, MUST SELECT A BMI TO COMPLETE) 7 filed at 01/13/2025 1532 7 filed at 10/04/2024 1429   Medical Factors Present cancer, chemotherapy, or previous malignancy filed at 01/13/2025 1532 COPD filed at 10/04/2024 1429   Surgical Factors Major surgery planned, including arthroscopic and laproscopic (1-2 hours) filed at 01/13/2025 1532 Major surgery planned, lasting 2-3 hours filed at 10/04/2024 1429   BMI (BMI MUST BE CHOSEN) 30 or less filed at 01/13/2025 1532 30 or less filed at 10/04/2024 1429          Modified Frailty Index      Flowsheet Row Pre-Admission Testing from 10/4/2024 in [facility]   Non-independent functional status (problems with dressing, bathing, personal grooming, or cooking) 0 filed at 10/04/2024 1430   History of diabetes mellitus  0 filed at 10/04/2024 1430   History of COPD 0.0909 filed at 10/04/2024 1430   History of CHF 0.0909 filed at 10/04/2024 1430   History of MI 0 filed at 10/04/2024 1430   History of Percutaneous Coronary Intervention, Cardiac Surgery, or Angina No filed at 10/04/2024 1430   Hypertension requiring the use of medication  0.0909 filed at 10/04/2024 1430   Peripheral vascular disease 0.0909 filed at 10/04/2024 1430   Impaired sensorium (cognitive impairement or loss, clouding, or delirium) 0 filed at 10/04/2024 1430   TIA or CVA withouy residual deficit 0 filed at 10/04/2024 1430   Cerebrovascular accident with deficit 0 filed at 10/04/2024 1430   Modified Frailty Index Calculator  .[id number] filed at 10/04/2024 1430          CHADS2 Stroke Risk  Current as of 8 minutes ago        N/A 3 to 100%: High Risk   2 to < 3%: Medium Risk   0 to < 2%: Low Risk     Last Change: N/A          This score determines the patient's risk of having a stroke if the patient has atrial fibrillation.        This score is not applicable to this patient. Components are not calculated.          Revised Cardiac Risk Index      Flowsheet Row Pre-Admission Testing from 1/13/2025 in [facility] Pre-Admission Testing from 10/4/2024 in [facility]   High-Risk Surgery (Intraperitoneal, Intrathoracic,Suprainguinal vascular) 0 filed at 01/13/2025 1528 0 filed at 10/04/2024 1429   History of ischemic heart disease (History of MI, History of positive exercuse test, Current chest paint considered due to myocardial ischemia, Use of nitrate therapy, ECG with pathological Q Waves) 0 filed at 01/13/2025 1528 1 filed at 10/04/2024 1429   History of congestive heart failure (pulmonary edemia, bilateral rales or S3 gallop, Paroxysmal nocturnal dyspnea, CXR showing pulmonary vascular redistribution) 1 filed at 01/13/2025 1528 0 filed at 10/04/2024 1429   History of cerebrovascular disease (Prior TIA or stroke) 0 filed at 01/13/2025 1528 0 filed at 10/04/2024 1429   Pre-operative insulin treatment 0 filed at 01/13/2025 1528 0 filed at 10/04/2024 1429   Pre-operative creatinine>2 mg/dl 0 filed at 01/13/2025 1528 0 filed at 10/04/2024 1429   Revised Cardiac Risk Calculator 1 filed at 01/13/2025 1528 1 filed at 10/04/2024 1429          Apfel Simplified Score      Flowsheet Row Pre-Admission Testing from 1/13/2025 in [facility] Pre-Admission Testing from 10/4/2024 in [facility]   Smoking status 0 filed at 01/13/2025 1527 0 filed at 10/04/2024 1430   History of motion sickness or PONV  0 filed at 01/13/2025 1527 0 filed at 10/04/2024 1430   Use of postoperative opioids 1 filed at  01/13/2025 1527 1 filed at 10/04/2024 1430   Gender - Female 1=Yes filed at 01/13/2025 1527 1=Yes filed at 10/04/2024 1430   Apfel Simplified Score Calculator 2 filed at 01/13/2025 1527 2 filed at 10/04/2024 1430          Risk Analysis Index Results This Encounter    No data found in the last 10 encounters.       Stop Bang Score      Flowsheet Row Pre-Admission Testing from 1/13/2025 in [facility] Pre-Admission Testing from 10/4/2024 in [facility]   Do you snore loudly? 1 filed at 01/13/2025 1449 0 filed at 10/04/2024 1414   Do you often feel tired or fatigued after your sleep? 1 filed at 01/13/2025 1449 0 filed at 10/04/2024 1414   Has anyone ever observed you stop breathing in your sleep? 0 filed at 01/13/2025 1449 0 filed at 10/04/2024 1414   Do you have or are you being treated for high blood pressure? 0 filed at 01/13/2025 1449 1 filed at 10/04/2024 1414   Recent BMI (Calculated) 28.9 filed at 01/13/2025 1449 28.9 filed at 10/04/2024 1414   Is BMI greater than 35 kg/m2? 0=No filed at 01/13/2025 1449 0=No filed at 10/04/2024 1414   Age older than 50 years old? 1=Yes filed at 01/13/2025 1449 1=Yes filed at 10/04/2024 1414   Is your neck circumference greater than 17 inches (Male) or 16 inches (Female)? 0 filed at 01/13/2025 1449 0 filed at 10/04/2024 1414   Gender - Male 0=No filed at 01/13/2025 1449 0=No filed at 10/04/2024 1414   STOP-BANG Total Score 3 filed at 01/13/2025 1449 2 filed at 10/04/2024 1414          Prodigy: High Risk  Total Score: 15              Prodigy Age Score      Prodigy CHF score          ARISCAT Score for Postoperative Pulmonary Complications      Flowsheet Row Pre-Admission Testing from 1/13/2025 in [facility]   Age Calculated Score 3 filed at 01/13/2025 1531   Preoperative SpO2 0 filed at 01/13/2025 1531   Respiratory infection in the last month Either upper or lower (i.e., URI, bronchitis, pneumonia), with fever and antibiotic  "treatment 0 filed at 01/13/2025 1531   Preoperative anemia (Hgb less than 10 g/dl) 0 filed at 01/13/2025 1531   Surgical incision  0 filed at 01/13/2025 1531   Duration of surgery  0 filed at 01/13/2025 1531   Emergency Procedure  0 filed at 01/13/2025 1531   ARISCAT Total Score  3 filed at 01/13/2025 1531          Robby Perioperative Risk for Myocardial Infarction or Cardiac Arrest (LYNETTE)    No data to display         Assessment and Plan:   Anesthesia/Airway:  No anesthesia complications      Neuro:    #Depression, H/o Substance Use Disorder, Insomnia, PTSD, - alcohol, cocaine, and opiate use disorders in sustained remission. Patient is current medicated for Depression/Insomnia/PTSD with Wellbutrin (continue), clonidine (continue), Lexapro (continue), Atarax (cotniue), trazadone(continue), topamax (continue). Patient reports PCP manages closely. No further perioperative interventions    Patient is at an increased risk for post operative delirium secondary to age >/= 65 and depression.  Preoperative brain exercise educational handout provided to patient.    The patient is at an increased risk for perioperative stroke secondary to increased age, HTN, HLD, female sex , general anesthesia, and hypercoagulable state (hx CA).       HEENT/Airway:    No HEENT diagnosis or significant findings on chart review or clinical presentation and evaluation. No further preoperative testing/intervention indicated at this time.      Cardiovascular:    #HLD, CHF, HTN, cardiomyopathy- medicated with ramipril (last dose 1/20), furosemide (continue), carvedilol (continue), atorvastatin (continue), aspirin (hold 5 days prior) and follows with PCP. Patient recently seen cardiology for preoperative evalaution prior to last surgery. Patient cardiovascular wrokup below, stress test and echocardiogram negative. Dr. Velazco noted patient is a \"low risk\" for surgeyr with her negative findings, which occurred recently in 10/2024. At this time, " patient has remained asymptomatic and physical exam is benign. She has not had any changes/new symptoms. Patient is on aspirin for preventative reasons, no history of CAD, therefore aspirin okay to hold for 5 days prior to procedure and resume after. No additional preoperative testing is currently indicated.    METS are 6.8    RCRI  1 which is 6% 30 day risk of MACE (risk for cardiac death, nonfatal myocardial infarction, and nonfactal cardiac arrest    LYNETTE score which indicates a   0.1 % risk of intraoperative or 30-day postoperative MACE    Echo TTE 10/11/24  CONCLUSIONS:   1. Left ventricular ejection fraction is normal, calculated by Kumar's biplane at 69%.   2. There is normal right ventricular global systolic function.   3. Right ventricular systolic pressure is within normal limits.    Stress Test 10/11/24  IMPRESSION:  1. No evidence of inducible myocardial ischemia or prior infarction.  2. The left ventricle is normal in size.  3. Normal LV wall motion with a post-stress LV EF estimated at  greater than 65%.  Cardiology Interpretation   Impression   1. No evidence of inducible myocardial ischemia or prior infarction.  2. The left ventricle is normal in size.  3. Normal LV wall motion with a post-stress LV EF estimated at  greater than 65%.     EKG 10/8/24  Normal sinus rhythm  Low voltage QRS  Cannot rule out Anteroseptal infarct , age undetermined  Abnormal ECG  When compared with ECG of 08-FEB-2023 14:08,  Nonspecific T wave abnormality now evident in Anterior leads  Nonspecific T wave abnormality no longer evident in Lateral leads  Confirmed by Lisha Velazco ([id number]) on 10/10/2024 8:23:23 AM      Pulmonary:    #Incidental pulmonary nodule, Tobacco Use Disorder - Patient recently stopped current tobacco us, reports she almost a week clear. Patient follows with PCP for lung nodule monitoring and is due for next CT imaging. No current respiratory symptoms at this time. See ROS. No perioperative  intervention at this time, patient is to follow with PCP for routine CT lung screening.   Preoperative deep breathing educational handout provided to patient.    Patient is at increased risk of perioperative complications secondary to age > 60, Tobacco abuse hx, types of anesthetic    ARISCAT:    3  points which is a low (1.6%) risk of in-hospital post-op pulmonary complications     PRODIGY:  15  points which is a high risk of post op opioid induced respiratory depression episodes    STOP BANG:  3   points which is a intermediate risk for moderate to severe STACIE    Pumonary toilet education discussed, patient also provided deep breathing exercises and incentive spirometry educational handout      Renal:   No renal diagnosis, however patient is at increase risk for perioperative renal complications secondary to  Age equal to or greater than 56, HTN, use of an ace, arb, or NSAID       Endocrine:   No endocrine diagnosis or significant findings on chart review or clinical presentation and evaluation. No further testing or intervention is indicated at this time.      Hematologic:      Preoperative DVT educational handout provided to patient.  No hematologic diagnosis, however patient is at an increased risk for DVT  Caprini Score:  7  points which is a highest risk of perioperative VTE      Gastrointestinal/:   #Vulvar dysplasia - seeking surgical intervention and follows with [facility].   #GERD, IBS, SCC of anus (AIN II), - medicated with pantoprazole (continue) and follows with PCP. Patient also follows with oncology/PCP. Patient underwent resection last year.  No further perioperative interventions at this time     EAT-10 score of    0  : self-perceived oropharyngeal dysphagia scale (0-40)     Apfel: 2 points 39% risk for post operative N/V      Infectious disease:     No infectious diagnosis or significant findings on chart review or clinical presentation and evaluation.       Musculoskeletal:    #Chronic Pain  Syndrome, Lumbosacral sponylosis - medicated with gabapentin (continue), ibuprofen (hold 7 days prior to surgery), Mobic (hold 7 days prior to surgery). Follows with pain management for spinal injections (last injection 10/2024). No further perioperative interventions .         Labs ordered  Recent Results (from the past 3 weeks)   CBC    Collection Time: 01/13/25  3:23 PM   Result Value Ref Range    WBC 9.1 4.4 - 11.3 x10*3/uL    nRBC 0.0 0.0 - 0.0 /100 WBCs    RBC 4.79 4.00 - 5.20 x10*6/uL    Hemoglobin 14.9 12.0 - 16.0 g/dL    Hematocrit 44.3 36.0 - 46.0 %    MCV 93 80 - 100 fL    MCH 31.1 26.0 - 34.0 pg    MCHC 33.6 32.0 - 36.0 g/dL    RDW 13.4 11.5 - 14.5 %    Platelets 226 150 - 450 x10*3/uL   Basic metabolic panel    Collection Time: 01/13/25  3:23 PM   Result Value Ref Range    Glucose 73 (L) 74 - 99 mg/dL    Sodium 137 136 - 145 mmol/L    Potassium 4.4 3.5 - 5.3 mmol/L    Chloride 102 98 - 107 mmol/L    Bicarbonate 26 21 - 32 mmol/L    Anion Gap 13 10 - 20 mmol/L    Urea Nitrogen 13 6 - 23 mg/dL    Creatinine 0.73 0.50 - 1.05 mg/dL    eGFR >90 >60 mL/min/1.73m*2    Calcium 9.5 8.6 - 10.6 mg/dL           Medication instructions and NPO guidelines reviewed with the patient.  All questions or concerns discussed and addressed.      Maile Piedra PA-C

## 2025-01-19 NOTE — HOSPITAL COURSE
[x] PAT- 1/13  [x] No plan for overnight obs  [ ] Consent NOT prepped  [x] Preop meds  [x] Add to list     Gynecologic Oncology Surgery History and Physical    Subjective   Amee You is a 64 y.o. 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    Dysplasia Hx  -hx of focal moderately invasive SCC of anus  -HATTIE 3 s/p WLE with pap and concurrent HRA w/ Dr. Santoro 10/16    PMH: COPD, CHF, thoracic outlet syndrome, depression, HTN, chronic pain    Past Surgical History   Past Surgical History:   Procedure Laterality Date    ANOSCOPY  09/20/2010    BIOPSY  2006    BIOPSY VULVA    COLONOSCOPY      FLEXIBLE SIGMOIDOSCOPY  01/22/2016    FLEXIBLE SIGMOIDOSCOPY  06/20/2014    KNEE ARTHROPLASTY Right     OTHER SURGICAL HISTORY  11/17/2012    Carotid subclavian bypass    OTHER SURGICAL HISTORY  10/10/2011    Examination under anesthesia, high-resolution anoscopy, biopsy of four condyloma acuminata from the anal canal and destruction of circumferential anal and perianal condyloma acuminata using a combination of infrared coagulation and electrocautery    TUBAL LIGATION  1994     Social History  She reports that she has been smoking cigarettes. She has a 15 pack-year smoking history. She has never used smokeless tobacco. She reports that she does not currently use alcohol. She reports that she does not use drugs.     Allergies  Pregabalin and Sulfamethoxazole     Medications  -atorvastatin 40mg   -wellbutrin 150mg TID, lexapro 20 mg, trazadone 50mg  -coreg 12.5 BID, clonidine 0.1mg prn  -lasix 40mg prn  -gabapentin 300mg BID  -protonix 40mg  -ramipril 10mg  -topiramate 25mg BID    ROS: negative except per HPI    Objective    Last Vitals  To be obtained. Will fu    Physical Examination  General: no acute distress  HEENT: normocephalic, atraumatic  Heart: warm and well perfused  Lungs: breathing  comfortably on room air  Abdomen: nondistended  Extremities: moving all extremities  Neuro: awake and conversant  Psych: appropriate mood and affect    Lab Review      Lab Results   Component Value Date    WBC 9.1 01/13/2025    HGB 14.9 01/13/2025    HCT 44.3 01/13/2025    MCV 93 01/13/2025     01/13/2025       Lab Results   Component Value Date    GLUCOSE 73 (L) 01/13/2025    CALCIUM 9.5 01/13/2025     01/13/2025    K 4.4 01/13/2025    CO2 26 01/13/2025     01/13/2025    BUN 13 01/13/2025    CREATININE 0.73 01/13/2025     Pertinent Path/Imaging  10/16 Biopsies and Pap Results  FINAL DIAGNOSIS   A. PERICLITORAL LESION, STITCH AT 12:00, RESECTION:   -- HIGH GRADE SQUAMOUS INTRAEPITHELIAL LESION (HATTIE 3, USUAL AND WARTY TYPES)  --MARGIN OF RESECTION FROM 4-9 O'CLOCK IS POSITIVE  --PERIURETHRAL MARGIN NEGATIVE     B.  RIGHT VULVAR BIOPSY 7:00:  -- HIGH GRADE SQUAMOUS INTRAEPITHELIAL LESION (HATTIE 3, USUAL AND WARTY TYPE)      C.  LEFT VULVAR BIOPSY 4:00:   -- HIGH GRADE SQUAMOUS INTRAEPITHELIAL LESION (HATTIE 3, USUAL TYPE).      Descriptive Interpretation  Low grade squamous intraepithelial lesion (LSIL) - Cervix  Atrophy with inflammation      Assessment/Plan     Amee You is a 64 y.o. with vulvar dysplasia (gross residual disease on posterior fourchette following previous WLE) and LSIL on recent pap presenting for scheduled surgery.    Plan to proceed with Exam Under Anesthesia, CO2 laser ablation of the vulva, and cervical conization  Surgical consent was reviewed.     To be d/w Dr. Vanegas     Gyn Onc, Pager [phone]

## 2025-01-22 ENCOUNTER — ANESTHESIA (OUTPATIENT)
Dept: OPERATING ROOM | Facility: HOSPITAL | Age: 65
End: 2025-01-22
Payer: COMMERCIAL

## 2025-01-22 ENCOUNTER — HOSPITAL ENCOUNTER (OUTPATIENT)
Facility: HOSPITAL | Age: 65
Setting detail: OUTPATIENT SURGERY
Discharge: HOME | End: 2025-01-22
Attending: STUDENT IN AN ORGANIZED HEALTH CARE EDUCATION/TRAINING PROGRAM | Admitting: STUDENT IN AN ORGANIZED HEALTH CARE EDUCATION/TRAINING PROGRAM
Payer: COMMERCIAL

## 2025-01-22 ENCOUNTER — ANESTHESIA EVENT (OUTPATIENT)
Dept: OPERATING ROOM | Facility: HOSPITAL | Age: 65
End: 2025-01-22
Payer: COMMERCIAL

## 2025-01-22 VITALS
SYSTOLIC BLOOD PRESSURE: 102 MMHG | TEMPERATURE: 97.2 F | DIASTOLIC BLOOD PRESSURE: 60 MMHG | OXYGEN SATURATION: 98 % | BODY MASS INDEX: 29.6 KG/M2 | WEIGHT: 146.83 LBS | RESPIRATION RATE: 11 BRPM | HEIGHT: 59 IN | HEART RATE: 66 BPM

## 2025-01-22 DIAGNOSIS — N90.3 VULVAR DYSPLASIA: ICD-10-CM

## 2025-01-22 DIAGNOSIS — Z98.890 POSTOPERATIVE STATE: Primary | ICD-10-CM

## 2025-01-22 PROCEDURE — 88307 TISSUE EXAM BY PATHOLOGIST: CPT | Performed by: PATHOLOGY

## 2025-01-22 PROCEDURE — 3600000003 HC OR TIME - INITIAL BASE CHARGE - PROCEDURE LEVEL THREE: Performed by: STUDENT IN AN ORGANIZED HEALTH CARE EDUCATION/TRAINING PROGRAM

## 2025-01-22 PROCEDURE — 2500000004 HC RX 250 GENERAL PHARMACY W/ HCPCS (ALT 636 FOR OP/ED): Performed by: NURSE ANESTHETIST, CERTIFIED REGISTERED

## 2025-01-22 PROCEDURE — 3600000008 HC OR TIME - EACH INCREMENTAL 1 MINUTE - PROCEDURE LEVEL THREE: Performed by: STUDENT IN AN ORGANIZED HEALTH CARE EDUCATION/TRAINING PROGRAM

## 2025-01-22 PROCEDURE — 3700000001 HC GENERAL ANESTHESIA TIME - INITIAL BASE CHARGE: Performed by: STUDENT IN AN ORGANIZED HEALTH CARE EDUCATION/TRAINING PROGRAM

## 2025-01-22 PROCEDURE — 7100000001 HC RECOVERY ROOM TIME - INITIAL BASE CHARGE: Performed by: STUDENT IN AN ORGANIZED HEALTH CARE EDUCATION/TRAINING PROGRAM

## 2025-01-22 PROCEDURE — 2500000005 HC RX 250 GENERAL PHARMACY W/O HCPCS: Performed by: STUDENT IN AN ORGANIZED HEALTH CARE EDUCATION/TRAINING PROGRAM

## 2025-01-22 PROCEDURE — 3600000005 HC OR TIME - INITIAL BASE CHARGE - PROCEDURE LEVEL FIVE: Performed by: STUDENT IN AN ORGANIZED HEALTH CARE EDUCATION/TRAINING PROGRAM

## 2025-01-22 PROCEDURE — 3700000002 HC GENERAL ANESTHESIA TIME - EACH INCREMENTAL 1 MINUTE: Performed by: STUDENT IN AN ORGANIZED HEALTH CARE EDUCATION/TRAINING PROGRAM

## 2025-01-22 PROCEDURE — 2500000004 HC RX 250 GENERAL PHARMACY W/ HCPCS (ALT 636 FOR OP/ED): Performed by: STUDENT IN AN ORGANIZED HEALTH CARE EDUCATION/TRAINING PROGRAM

## 2025-01-22 PROCEDURE — 7100000010 HC PHASE TWO TIME - EACH INCREMENTAL 1 MINUTE: Performed by: STUDENT IN AN ORGANIZED HEALTH CARE EDUCATION/TRAINING PROGRAM

## 2025-01-22 PROCEDURE — 7100000002 HC RECOVERY ROOM TIME - EACH INCREMENTAL 1 MINUTE: Performed by: STUDENT IN AN ORGANIZED HEALTH CARE EDUCATION/TRAINING PROGRAM

## 2025-01-22 PROCEDURE — 88305 TISSUE EXAM BY PATHOLOGIST: CPT | Performed by: PATHOLOGY

## 2025-01-22 PROCEDURE — 56501 DESTROY VULVA LESIONS SIM: CPT | Performed by: STUDENT IN AN ORGANIZED HEALTH CARE EDUCATION/TRAINING PROGRAM

## 2025-01-22 PROCEDURE — 2500000005 HC RX 250 GENERAL PHARMACY W/O HCPCS

## 2025-01-22 PROCEDURE — 7100000009 HC PHASE TWO TIME - INITIAL BASE CHARGE: Performed by: STUDENT IN AN ORGANIZED HEALTH CARE EDUCATION/TRAINING PROGRAM

## 2025-01-22 PROCEDURE — 3600000010 HC OR TIME - EACH INCREMENTAL 1 MINUTE - PROCEDURE LEVEL FIVE: Performed by: STUDENT IN AN ORGANIZED HEALTH CARE EDUCATION/TRAINING PROGRAM

## 2025-01-22 PROCEDURE — 88305 TISSUE EXAM BY PATHOLOGIST: CPT | Mod: TC,SUR,WESLAB | Performed by: SURGERY

## 2025-01-22 PROCEDURE — 46615 ANOSCOPY: CPT | Performed by: SURGERY

## 2025-01-22 PROCEDURE — 57520 CONIZATION OF CERVIX: CPT | Performed by: STUDENT IN AN ORGANIZED HEALTH CARE EDUCATION/TRAINING PROGRAM

## 2025-01-22 RX ORDER — ONDANSETRON HYDROCHLORIDE 2 MG/ML
4 INJECTION, SOLUTION INTRAVENOUS ONCE AS NEEDED
Status: DISCONTINUED | OUTPATIENT
Start: 2025-01-22 | End: 2025-01-22 | Stop reason: HOSPADM

## 2025-01-22 RX ORDER — DROPERIDOL 2.5 MG/ML
0.62 INJECTION, SOLUTION INTRAMUSCULAR; INTRAVENOUS ONCE AS NEEDED
Status: DISCONTINUED | OUTPATIENT
Start: 2025-01-22 | End: 2025-01-22 | Stop reason: HOSPADM

## 2025-01-22 RX ORDER — FENTANYL CITRATE 50 UG/ML
INJECTION, SOLUTION INTRAMUSCULAR; INTRAVENOUS AS NEEDED
Status: DISCONTINUED | OUTPATIENT
Start: 2025-01-22 | End: 2025-01-22

## 2025-01-22 RX ORDER — ACETAMINOPHEN 325 MG/1
650 TABLET ORAL EVERY 4 HOURS PRN
Status: DISCONTINUED | OUTPATIENT
Start: 2025-01-22 | End: 2025-01-22 | Stop reason: HOSPADM

## 2025-01-22 RX ORDER — LIDOCAINE HCL/PF 100 MG/5ML
SYRINGE (ML) INTRAVENOUS AS NEEDED
Status: DISCONTINUED | OUTPATIENT
Start: 2025-01-22 | End: 2025-01-22

## 2025-01-22 RX ORDER — BUPIVACAINE HYDROCHLORIDE AND EPINEPHRINE 5; 5 MG/ML; UG/ML
INJECTION, SOLUTION EPIDURAL; INTRACAUDAL; PERINEURAL AS NEEDED
Status: DISCONTINUED | OUTPATIENT
Start: 2025-01-22 | End: 2025-01-22 | Stop reason: HOSPADM

## 2025-01-22 RX ORDER — SODIUM CHLORIDE, SODIUM LACTATE, POTASSIUM CHLORIDE, CALCIUM CHLORIDE 600; 310; 30; 20 MG/100ML; MG/100ML; MG/100ML; MG/100ML
INJECTION, SOLUTION INTRAVENOUS CONTINUOUS PRN
Status: DISCONTINUED | OUTPATIENT
Start: 2025-01-22 | End: 2025-01-22

## 2025-01-22 RX ORDER — HYDROMORPHONE HYDROCHLORIDE 0.2 MG/ML
0.1 INJECTION INTRAMUSCULAR; INTRAVENOUS; SUBCUTANEOUS EVERY 5 MIN PRN
Status: DISCONTINUED | OUTPATIENT
Start: 2025-01-22 | End: 2025-01-22 | Stop reason: HOSPADM

## 2025-01-22 RX ORDER — MIDAZOLAM HYDROCHLORIDE 1 MG/ML
INJECTION INTRAMUSCULAR; INTRAVENOUS AS NEEDED
Status: DISCONTINUED | OUTPATIENT
Start: 2025-01-22 | End: 2025-01-22

## 2025-01-22 RX ORDER — PROCHLORPERAZINE EDISYLATE 5 MG/ML
5 INJECTION INTRAMUSCULAR; INTRAVENOUS ONCE AS NEEDED
Status: DISCONTINUED | OUTPATIENT
Start: 2025-01-22 | End: 2025-01-22 | Stop reason: HOSPADM

## 2025-01-22 RX ORDER — HYDROMORPHONE HYDROCHLORIDE 0.2 MG/ML
0.2 INJECTION INTRAMUSCULAR; INTRAVENOUS; SUBCUTANEOUS EVERY 5 MIN PRN
Status: DISCONTINUED | OUTPATIENT
Start: 2025-01-22 | End: 2025-01-22 | Stop reason: HOSPADM

## 2025-01-22 RX ORDER — OXYCODONE HYDROCHLORIDE 5 MG/1
5 TABLET ORAL EVERY 4 HOURS PRN
Status: DISCONTINUED | OUTPATIENT
Start: 2025-01-22 | End: 2025-01-22 | Stop reason: HOSPADM

## 2025-01-22 RX ORDER — ONDANSETRON HYDROCHLORIDE 2 MG/ML
INJECTION, SOLUTION INTRAVENOUS AS NEEDED
Status: DISCONTINUED | OUTPATIENT
Start: 2025-01-22 | End: 2025-01-22

## 2025-01-22 RX ORDER — ACETAMINOPHEN 500 MG
1000 TABLET ORAL EVERY 6 HOURS PRN
Qty: 30 TABLET | Refills: 0 | Status: SHIPPED | OUTPATIENT
Start: 2025-01-22 | End: 2025-02-06 | Stop reason: SDUPTHER

## 2025-01-22 RX ORDER — PROPOFOL 10 MG/ML
INJECTION, EMULSION INTRAVENOUS AS NEEDED
Status: DISCONTINUED | OUTPATIENT
Start: 2025-01-22 | End: 2025-01-22

## 2025-01-22 RX ORDER — IBUPROFEN 600 MG/1
600 TABLET ORAL EVERY 6 HOURS PRN
Qty: 30 TABLET | Refills: 1 | Status: SHIPPED | OUTPATIENT
Start: 2025-01-22 | End: 2025-01-23 | Stop reason: SDUPTHER

## 2025-01-22 RX ORDER — OXYCODONE HYDROCHLORIDE 5 MG/1
10 TABLET ORAL EVERY 4 HOURS PRN
Status: DISCONTINUED | OUTPATIENT
Start: 2025-01-22 | End: 2025-01-22 | Stop reason: HOSPADM

## 2025-01-22 RX ORDER — TRAMADOL HYDROCHLORIDE 50 MG/1
50 TABLET ORAL EVERY 6 HOURS PRN
Qty: 15 EACH | Refills: 0 | Status: SHIPPED | OUTPATIENT
Start: 2025-01-22 | End: 2025-01-23 | Stop reason: SDUPTHER

## 2025-01-22 RX ORDER — DOCUSATE SODIUM 100 MG/1
100 CAPSULE, LIQUID FILLED ORAL 2 TIMES DAILY PRN
Qty: 60 CAPSULE | Refills: 1 | Status: SHIPPED | OUTPATIENT
Start: 2025-01-22

## 2025-01-22 RX ADMIN — PROPOFOL 150 MG: 10 INJECTION, EMULSION INTRAVENOUS at 14:07

## 2025-01-22 RX ADMIN — SODIUM CHLORIDE, POTASSIUM CHLORIDE, SODIUM LACTATE AND CALCIUM CHLORIDE: 600; 310; 30; 20 INJECTION, SOLUTION INTRAVENOUS at 14:01

## 2025-01-22 RX ADMIN — ONDANSETRON 4 MG: 2 INJECTION INTRAMUSCULAR; INTRAVENOUS at 14:29

## 2025-01-22 RX ADMIN — FENTANYL CITRATE 50 MCG: 50 INJECTION, SOLUTION INTRAMUSCULAR; INTRAVENOUS at 14:07

## 2025-01-22 RX ADMIN — HYDROMORPHONE HYDROCHLORIDE 0.5 MG: 0.5 INJECTION, SOLUTION INTRAMUSCULAR; INTRAVENOUS; SUBCUTANEOUS at 16:20

## 2025-01-22 RX ADMIN — FENTANYL CITRATE 25 MCG: 50 INJECTION, SOLUTION INTRAMUSCULAR; INTRAVENOUS at 14:20

## 2025-01-22 RX ADMIN — MIDAZOLAM HYDROCHLORIDE 2 MG: 1 INJECTION, SOLUTION INTRAMUSCULAR; INTRAVENOUS at 14:01

## 2025-01-22 RX ADMIN — HYDROMORPHONE HYDROCHLORIDE 0.2 MG: 0.2 INJECTION, SOLUTION INTRAMUSCULAR; INTRAVENOUS; SUBCUTANEOUS at 15:30

## 2025-01-22 RX ADMIN — LIDOCAINE HYDROCHLORIDE 100 MG: 20 INJECTION INTRAVENOUS at 14:07

## 2025-01-22 RX ADMIN — SODIUM CHLORIDE, SODIUM LACTATE, POTASSIUM CHLORIDE, AND CALCIUM CHLORIDE 500 ML: 600; 310; 30; 20 INJECTION, SOLUTION INTRAVENOUS at 16:08

## 2025-01-22 RX ADMIN — FENTANYL CITRATE 25 MCG: 50 INJECTION, SOLUTION INTRAMUSCULAR; INTRAVENOUS at 14:46

## 2025-01-22 SDOH — HEALTH STABILITY: MENTAL HEALTH: CURRENT SMOKER: 0

## 2025-01-22 ASSESSMENT — PAIN - FUNCTIONAL ASSESSMENT
PAIN_FUNCTIONAL_ASSESSMENT: 0-10

## 2025-01-22 ASSESSMENT — PAIN SCALES - GENERAL
PAINLEVEL_OUTOF10: 5 - MODERATE PAIN
PAINLEVEL_OUTOF10: 7
PAINLEVEL_OUTOF10: 5 - MODERATE PAIN
PAINLEVEL_OUTOF10: 7
PAINLEVEL_OUTOF10: 5 - MODERATE PAIN
PAINLEVEL_OUTOF10: 7
PAINLEVEL_OUTOF10: 5 - MODERATE PAIN
PAINLEVEL_OUTOF10: 7
PAINLEVEL_OUTOF10: 5 - MODERATE PAIN

## 2025-01-22 NOTE — H&P
History Of Present Illness  Amee You is a 64 y.o. female presenting with VIN3, LSIL pap, and recurrent anal dysplasia in the setting of a h/o SCC of the anus.      Past Medical History  Past Medical History:   Diagnosis Date    Anal dysplasia     Seen by Dr. Josie Lepe on 09/03/24    Anxiety     Arthritis     hands, knees    Cardiomyopathy 06/15/2015    CHF (congestive heart failure)     Depression 2011    GERD (gastroesophageal reflux disease)     controlled    History of substance abuse (Multi)     Hx of thoracic outlet syndrome     Hyperlipidemia 07/02/2012    Hypertension     Insomnia     Lumbosacral spondylosis 11/06/2024    Moderate episode of recurrent major depressive disorder     Obesity 2011    PTSD (post-traumatic stress disorder)     Snoring     Subclavian artery stenosis, left     s/p Carotid subclavian bypass in 2012    Thoracic outlet syndrome     Vulvar dysplasia     Seen by Dr. Reba Vanegas on 09/09/24       Surgical History  Past Surgical History:   Procedure Laterality Date    ANOSCOPY  09/20/2010    BIOPSY  2006    BIOPSY VULVA    COLONOSCOPY      FLEXIBLE SIGMOIDOSCOPY  01/22/2016    FLEXIBLE SIGMOIDOSCOPY  06/20/2014    KNEE ARTHROPLASTY Right     OTHER SURGICAL HISTORY  11/17/2012    Carotid subclavian bypass    OTHER SURGICAL HISTORY  10/10/2011    Examination under anesthesia, high-resolution anoscopy, biopsy of four condyloma acuminata from the anal canal and destruction of circumferential anal and perianal condyloma acuminata using a combination of infrared coagulation and electrocautery    TUBAL LIGATION  1994        Social History  She reports that she quit smoking about 2 weeks ago. Her smoking use included cigarettes. She has never used smokeless tobacco. She reports that she does not currently use alcohol. She reports that she does not currently use drugs after having used the following drugs: Cocaine.    Family History  Family History   Problem Relation Name Age of  Onset    Ovarian cancer Mother Mom     Stroke Mother Mom     Arthritis Mother Mom     Cancer Mother Mom     Heart attack Father Dad     Hypertension Father Dad     Cancer Father Dad     Heart disease Father Dad     Brain cancer Brother      Breast cancer Maternal Grandmother Grandpa     Diabetes Paternal Grandmother Grandma     Hearing loss Paternal Grandfather Grandpa     Alcohol abuse Brother Ronald     Cancer Brother Ronald     Cancer Mother's Brother Reagan     COPD Sister Vitaly         Allergies  Pregabalin and Sulfamethoxazole    Review of Systems   All other systems reviewed and are negative.       Physical Exam  Vitals reviewed.   Constitutional:       Appearance: Normal appearance.   Cardiovascular:      Rate and Rhythm: Normal rate.   Pulmonary:      Effort: Pulmonary effort is normal.   Abdominal:      General: Abdomen is flat.      Palpations: Abdomen is soft.   Skin:     General: Skin is warm and dry.   Neurological:      Mental Status: She is alert.          Last Recorded Vitals  There were no vitals taken for this visit.    Relevant Results  Scheduled medications    Continuous medications    PRN medications    No results found for this or any previous visit (from the past 24 hours).       Assessment/Plan   Assessment & Plan  Vulvar dysplasia  64 y.o. female presenting with VIN3, LSIL pap, and recurrent anal dysplasia in the setting of a h/o SCC of the anus.     H&P reviewed, no changes. Plan to proceed with EUA, CKCm CO2 laser of vulvar lesions, and anal biopsies.    Patient seen and d/w Dr. Vnaegas.    Meg Watts MD

## 2025-01-22 NOTE — DISCHARGE INSTRUCTIONS
Vulvar Care after vulvar surgery  1. You may experience a yellow-gray discharge for the first several days, this is from a medication that we used.   2. You can take tylenol/ibuprofen for pain, tramadol as needed for severe pain.   3. Please call if you have vaginal bleeding that saturates 1 maxi pad every hour for 2 hours.   4. Avoid constipation and straining with bowel movements.  You may take 100 mg of Colace twice daily as a stool softener (over the counter).  Stronger laxatives can be used as necessary.  Perform your sitz baths or shower rinses preferentially after bowel movements.  5. You can ice the area by applying an ice pack or frozen peas to the area. Do not ice for longer than 20 minutes  6.  If you have questions or experience fevers greater than 101 F degrees, foul-smelling drainage, excessive pain not relieved by prescribed medications, or have any questions after your discharge, call # [phone].  After hours your providers will be contacted via an answering service.

## 2025-01-22 NOTE — ANESTHESIA POSTPROCEDURE EVALUATION
Patient: Amee You    Procedure Summary       Date: 01/22/25 Room / Location: [facility] OR 01 / Virtual [facility] OR    Anesthesia Start: 1401 Anesthesia Stop: 1505    Procedures:       Examination under anesthesia, CO2 laser ablation of vulvar lesions, cervical conization with endocervical curettage (Bilateral)      ANOSCOPY, WITH LESION REMOVAL Diagnosis:       Vulvar dysplasia      (Vulvar dysplasia [N90.3])    Surgeons: Reba Vanegas MD; Josie Lepe MD Responsible Provider: Seth Deutsch MD    Anesthesia Type: general ASA Status: 3            Anesthesia Type: general    Vitals Value Taken Time   BP 98/58 01/22/25 1615   Temp 36.1 °C (97 °F) 01/22/25 1505   Pulse 63 01/22/25 1634   Resp 13 01/22/25 1634   SpO2 91 % 01/22/25 1634   Vitals shown include unfiled device data.    Anesthesia Post Evaluation    Patient location during evaluation: PACU  Patient participation: complete - patient participated  Level of consciousness: awake  Pain management: adequate  Airway patency: patent  Cardiovascular status: acceptable  Respiratory status: acceptable  Hydration status: acceptable  Postoperative Nausea and Vomiting: none    No notable events documented.

## 2025-01-22 NOTE — ASSESSMENT & PLAN NOTE
64 y.o. female presenting with VIN3, LSIL pap, and recurrent anal dysplasia in the setting of a h/o SCC of the anus.     H&P reviewed, no changes. Plan to proceed with EUA, CKCm CO2 laser of vulvar lesions, and anal biopsies.

## 2025-01-22 NOTE — OP NOTE
Examination under anesthesia, CO2 laser ablation of vulvar lesions, cervical conization with endocervical curettage (B) Operative Note     Date: 2025  OR Location: [facility] OR    Name: Amee You, : 1960, Age: 64 y.o., MRN: [id number], Sex: female    Diagnosis  Pre-op Diagnosis      * Vulvar dysplasia [N90.3] Post-op Diagnosis     * Vulvar dysplasia [N90.3]     Procedures  Examination under anesthesia, CO2 laser ablation of vulvar lesions, cervical conization with endocervical curettage  [id number] - [place] DESTRUCTION LESIONS VULVA SIMPLE    Examination under anesthesia, CO2 laser ablation of vulvar lesions, cervical conization with endocervical curettage  [id number] - [place] CONIZATION CERVIX,KNIFE/LASER    ANOSCOPY, WITH LESION REMOVAL      Surgeons   Panel 1:     * Reba Vanegas - Primary  Panel 2:     * Josie Lepe - Primary    Resident/Fellow/Other Assistant:  Surgeons and Role:  Panel 1:     * Josie Lepe MD - Assisting     * Meg Watts MD - Fellow  *Jules Bray MS3    Staff:   Circulator: Eligio Antunez Person: Mark    Anesthesia Staff: Anesthesiologist: Seth Deutsch MD  CRNA: WESLEY Huff-CRNA    Procedure Summary  Anesthesia: General  ASA: III  Estimated Blood Loss: 10mL  Intra-op Medications: Administrations occurring from 1315 to 1400 on 25:  * No intraprocedure medications in log *           Anesthesia Record               Intraprocedure I/O Totals          Intake    LR infusion 300.00 mL    Total Intake 300 mL       Output    Est. Blood Loss 5 mL    Total Output 5 mL       Net    Net Volume 295 mL          Specimen:   ID Type Source Tests Collected by Time   1 : Perianal lesions Tissue ANUS BIOPSY SURGICAL PATHOLOGY EXAM Josie Lepe MD 2025 1430   2 : ECC Tissue ENDOCERVIX CURETTINGS SURGICAL PATHOLOGY EXAM Reba Vanegas MD 2025 1438   3 : Cervical Cone, Stitch at 1200 Tissue CERVIX CONE SURGICAL PATHOLOGY EXAM Reba Vanegas  MD 1/22/2025 1444                 Drains and/or Catheters: * None in log *      Findings: Several lesions noted at the perineum as well as bilateral gluteal regions (gluteal lesions right and left about 1cm in size, at 5 o'clock on either side) concerning for dysplasia. Cervix normal in appearance. Vaginal introitus narrowed, tissue thinning and labial agglutination.      Indications: Amee You is an 64 y.o. female who is having surgery for Vulvar dysplasia [N90.3].     The patient was seen in the preoperative area. The risks, benefits, complications, treatment options, non-operative alternatives, expected recovery and outcomes were discussed with the patient. The possibilities of reaction to medication, pulmonary aspiration, injury to surrounding structures, bleeding, recurrent infection, the need for additional procedures, failure to diagnose a condition, and creating a complication requiring transfusion or operation were discussed with the patient. The patient concurred with the proposed plan, giving informed consent.  The site of surgery was properly noted/marked if necessary per policy. The patient has been actively warmed in preoperative area. Preoperative antibiotics are not indicated. Venous thrombosis prophylaxis have been ordered including bilateral sequential compression devices    Procedure Details:   Consent was completed in pre-op holding area after discussing all risks/benefits/alternatives.   Pt was taken to OR with IV in place. Team huddle and timeout were performed with all appropriate team members.  Anesthesia was induced without difficulty. [facility] and prepped and draped in the usual sterile fashion.      Dr. Santoro performed anoscopy, biopsy and fulguration of gluteal and perineal lesions, please see separately dictated operative report. We then proceeded with the Cold Knife Conization. The retractors were placed in the vagina and the  ant lip was grasped with a single tooth tenaculum.  The cervix was injected with 10cc of lidocaine with epinephrine. 2 stay sutures were placed with 0-vicryl suture, 1 at 3 oclock and one at 9 oclock.  a scalpel was used to incise the cervix in a typical cone fashion without difficulty,  The base was cut with scissors to detach from the remaining cervix.  Specimen was tagged at 12 oclock.  ECC was then performed.  Bovie rollerball was used to achieve hemostasis.  Monsels solution was applied to the bed of the cone.  Hemostasis was achieved.  The stay sutures were cut to approximately 2cm.     We then turned our attention to the CO2 laser portion of the procedure.     Wet sterile towels were placed around the perineum. CO2 laser was set to power setting of 10 mackey continuous and the bilateral perineal lesions were ablated carefully using laser in painting technique. Wet lap was used to slough off charred layer to ensure adequate depth of ablation. H&E ointment was applied to the areas at the end of the procedure. All sponge, lap and needle counts were correct at the end of the procedure. Overall the patient tolerated procedure well and was taken to PACU in stable condition. Dr. Vanegas was present for all portions of the procedure.       Complications:  None; patient tolerated the procedure well.    Disposition: PACU - hemodynamically stable.  Condition: stable     Meg Watts MD  Gynecologic Oncology Fellow    Additional Details: None    Attending Attestation:     Reba Vanegas  Phone Number: [phone]

## 2025-01-22 NOTE — ANESTHESIA PROCEDURE NOTES
Airway  Date/Time: 1/22/2025 2:09 PM  Urgency: elective    Airway not difficult    Staffing  Performed: CRNA   Authorized by: Josie Byers DO    Performed by: WESLEY Huff-LUZ MARINA  Patient location during procedure: OR    Indications and Patient Condition  Indications for airway management: anesthesia  Sedation level: deep  Preoxygenated: yes  Patient position: sniffing  Mask difficulty assessment: 0 - not attempted  Planned trial extubation    Final Airway Details  Final airway type: supraglottic airway      Successful airway: classic  Size 3     Number of attempts at approach: 1

## 2025-01-22 NOTE — ANESTHESIA PREPROCEDURE EVALUATION
Patient: Amee You    Procedure Information       Date/Time: 01/22/25 1315    Procedure: Examination under anesthesia, CO2 laser ablation of vulvar lesions, cervical conization with endocervical curettage (Bilateral)    Location: [facility] OR 01 / Virtual [facility] OR    Surgeons: Reba Vanegas MD            Relevant Problems   Anesthesia (within normal limits)      Cardiac   (+) Congestive heart failure   (+) Essential hypertension   (+) Hyperlipidemia      Pulmonary  History of tobacco use, recently quit January 2025      Neuro   (+) Depressive disorder   (+) Moderate episode of recurrent major depressive disorder      GI   (+) Gastroesophageal reflux disease   (+) IBS (irritable bowel syndrome)      /Renal (within normal limits)      Liver (within normal limits)      Endocrine   (+) Obesity      Hematology (within normal limits)      Musculoskeletal   (+) Chronic pain syndrome   (+) Lumbosacral spondylosis   (+) Osteoarthritis, knee   (+) Thoracic spondylosis      ID   (+) Vaginal condyloma       Clinical information reviewed:                   NPO Detail:  NPO/Void Status  Date of Last Liquid: 01/22/25  Time of Last Liquid: 0930 (black coffee with sugar)  Date of Last Solid: 01/21/25         Physical Exam    Airway  Mallampati: II  Neck ROM: full     Cardiovascular    Dental   (+) upper dentures     Pulmonary    Abdominal            Anesthesia Plan    History of general anesthesia?: yes  History of complications of general anesthesia?: no    ASA 3     general   (General LMA)  The patient is not a current smoker.  Education provided regarding risk of obstructive sleep apnea.  intravenous induction   Postoperative administration of opioids is intended.  Trial extubation is planned.  Anesthetic plan and risks discussed with patient.  Use of blood products discussed with patient who consented to blood products.    Plan discussed with attending and CRNA.

## 2025-01-23 ENCOUNTER — TELEPHONE (OUTPATIENT)
Dept: GYNECOLOGIC ONCOLOGY | Facility: HOSPITAL | Age: 65
End: 2025-01-23
Payer: COMMERCIAL

## 2025-01-23 DIAGNOSIS — Z98.890 POSTOPERATIVE STATE: ICD-10-CM

## 2025-01-23 RX ORDER — IBUPROFEN 600 MG/1
600 TABLET ORAL EVERY 6 HOURS PRN
Qty: 30 TABLET | Refills: 1 | Status: SHIPPED | OUTPATIENT
Start: 2025-01-23

## 2025-01-23 RX ORDER — TRAMADOL HYDROCHLORIDE 50 MG/1
50 TABLET ORAL EVERY 6 HOURS PRN
Qty: 15 TABLET | Refills: 0 | Status: SHIPPED | OUTPATIENT
Start: 2025-01-23

## 2025-01-23 NOTE — TELEPHONE ENCOUNTER
Patient called requesting postop medications prescribed yesterday be sent to [facility] and not [facility] as patient does not use [facility].     Message routed to Dr. Watts, Josie Loza, and Nancy Patricio, CNP    1311   Phoned patient to notify that Tramadol and Ibuprofen prescriptions were sent to [facility] pharmacy today.  Notified patient that Acetaminophen and Docusate were sent to [facility] on 1/22/25.    Message left on patient voice mail with prescription update.

## 2025-01-24 ENCOUNTER — APPOINTMENT (OUTPATIENT)
Dept: PRIMARY CARE | Facility: CLINIC | Age: 65
End: 2025-01-24
Payer: COMMERCIAL

## 2025-01-27 NOTE — OP NOTE
Examination under anesthesia, CO2 laser ablation of vulvar lesions, cervical conization with endocervical curettage (B) Operative Note     Date: 2025  OR Location: [facility] OR    Name: Amee You, : 1960, Age: 64 y.o., MRN: [id number], Sex: female    Diagnosis  Pre-op Diagnosis      * Vulvar dysplasia [N90.3] Post-op Diagnosis     * Vulvar dysplasia [N90.3]     Procedures  Examination under anesthesia, CO2 laser ablation of vulvar lesions, cervical conization with endocervical curettage  [id number] - [place] DESTRUCTION LESIONS VULVA SIMPLE    Examination under anesthesia, CO2 laser ablation of vulvar lesions, cervical conization with endocervical curettage  [id number] - [place] CONIZATION CERVIX,KNIFE/LASER    ANOSCOPY, WITH LESION REMOVAL      Surgeons   Panel 1:     * Reba Vanegas - Primary  Panel 2:     * Josie Lepe - Primary    Resident/Fellow/Other Assistant:  Surgeons and Role:  Panel 1:     * Josie Lepe MD - Assisting     * Meg Watts MD - Fellow    Staff:   Circulator: Eligio Antunez Person: Mark    Anesthesia Staff: Anesthesiologist: Seth Deutsch MD  CRNA: WESLEY Huff-CRNA    Procedure Summary  Anesthesia: General  ASA: III  Estimated Blood Loss: 0mL  Intra-op Medications: Administrations occurring from 1315 to 1400 on 25:  * No intraprocedure medications in log *           Anesthesia Record               Intraprocedure I/O Totals          Intake    LR infusion 300.00 mL    Total Intake 300 mL       Output    Est. Blood Loss 5 mL    Total Output 5 mL       Net    Net Volume 295 mL          Specimen:   ID Type Source Tests Collected by Time   1 : Perianal lesions Tissue ANUS BIOPSY SURGICAL PATHOLOGY EXAM Josie Lepe MD 2025 1430   2 : ECC Tissue ENDOCERVIX CURETTINGS SURGICAL PATHOLOGY EXAM Reba Vanegas MD 2025 1438   3 : Cervical Cone, Stitch at 1200 Tissue CERVIX CONE SURGICAL PATHOLOGY EXAM Reba Vanegas MD 2025 8619                  Drains and/or Catheters: * None in log *    Tourniquet Times:         Implants:     Findings: perianal skin lesions c/w dysplasia; HRA with one possible area anteriorly    Indications: Amee You is an 64 y.o. female who is having surgery for Vulvar dysplasia [N90.3]. And anal dysplsia    The patient was seen in the preoperative area. The risks, benefits, complications, treatment options, non-operative alternatives, expected recovery and outcomes were discussed with the patient. The possibilities of reaction to medication, pulmonary aspiration, injury to surrounding structures, bleeding, recurrent infection, the need for additional procedures, failure to diagnose a condition, and creating a complication requiring transfusion or operation were discussed with the patient. The patient concurred with the proposed plan, giving informed consent.  The site of surgery was properly noted/marked if necessary per policy. The patient has been actively warmed in preoperative area. Preoperative antibiotics are not indicated. Venous thrombosis prophylaxis have been ordered including bilateral sequential compression devices    Procedure Details: Procedure:   Informed consent was obtained including discussion of risks, benefits, and alternatives. The patient was brought to the operating room and placed supine. Sequential compression devices were placed. Huddle was completed in accordance with hospital procedures. Anesthesia was induced and LMA was placed. The patient was placed in lithotomy with all pressure points padded. The area was prepped and draped in the usual fashion. Time out was completed. Digital rectal exam was performed. Pudendal nerve block was performed with 1/4% marcaine with epinephrine. Intersphincteric block  was also performed. A total of 20cc of local anesthesia was utilized.    Externally, the perineal exam was notable for three 1.5-2cm hyperpigmented and dysplastic appearing lesions. These  were excised in the dermal plane. Hemostasis was assured.. Lighted Hill-Rossi anoscope was introduced. The anal canal was examined sequentially. The canal itself was divided into octants and in each octant, the distal rectum, dentate, anal canal, and anal verge was examined. Magnification, acetic acid, and Lugol's were utilized to improve visualization. Areas of abnormality were biopsied and destroyed. There was one small area in the anterior treated with fulguration.     The area was checked for hemostasis and found to be adequate. All visible lesions were addressed.     The procedure was then turned over to Dr Vanegas and her team.     Complications:  None; patient tolerated the procedure well.    Disposition: PACU - hemodynamically stable. - after Dr Edmondson's procedure  Condition: stable                 Additional Details: none    Attending Attestation: I was present and scrubbed for the entire procedure.    Reba Vanegas  Phone Number: [phone]

## 2025-01-28 NOTE — PROGRESS NOTES
Patient had a nuclear stress test and echo done both are normal  Patient can be considered as low risk from cardiovascular standpoint for the surgery

## 2025-01-30 ENCOUNTER — DOCUMENTATION (OUTPATIENT)
Dept: PRIMARY CARE | Facility: CLINIC | Age: 65
End: 2025-01-30

## 2025-01-30 ENCOUNTER — APPOINTMENT (OUTPATIENT)
Dept: PRIMARY CARE | Facility: CLINIC | Age: 65
End: 2025-01-30
Payer: COMMERCIAL

## 2025-01-30 DIAGNOSIS — F43.10 PTSD (POST-TRAUMATIC STRESS DISORDER): Primary | ICD-10-CM

## 2025-01-30 PROCEDURE — 99493 SBSQ PSYC COLLAB CARE MGMT: CPT | Performed by: FAMILY MEDICINE

## 2025-01-30 PROCEDURE — 99494 1ST/SBSQ PSYC COLLAB CARE: CPT | Performed by: FAMILY MEDICINE

## 2025-01-30 NOTE — PROGRESS NOTES
Collaborative Care (CoCM)  Progress Note    Type of Interaction: Phone    Start Time: 11a    End Time: 11:50a        Appointment: Scheduled    Reason for Visit:   Chief Complaint   Patient presents with    PTSD (Post-Traumatic Stress Disorder)          Interval History / Patient Symptoms: Patient has not smoked in 3 weeks. Patient reports her body is going through a lot of changes. Patient is proud of herself that she has stopped smoking. Patient states breathing is easier, smell and taste have been returning for her. Patient recently had surgery. Patient is still recovering. Patient processed how difficult it is to wait for her test results to see if the cells are cancerous. Patient discusses the distant within her relationship and how it makes her feel. Patient is learning to take care of her own needs and get out of the house and do what she enjoys and finds happiness in. Patient to consider getting out of the home to work or do some part-time work, this would get her out of the home to meet people and to make her own money.    Patient Health Questionnaire-9 Score: 5 (1/17/2025 11:00 AM)  PRASANNA-7 Total Score: 5 (1/17/2025 11:00 AM)        Interventions Provided: [place] Setting, Behavioral Activation, Develop Coping Strategies, Review Progress/Goals Stress Management, and Mindfulness      Progress Made: Mixed    Response to Intervention: patient to work on taking care of her own needs. Patient is reflective and recognizes that she can go out and create her own zackary and happiness. Patient recognizes that the environment she is can be toxic. Patient to work on ways to be healthy and take care of her own needs.                  Follow Up / Next Appointment:    2/19/2025@12:30p

## 2025-01-31 LAB
LABORATORY COMMENT REPORT: NORMAL
PATH REPORT.COMMENTS IMP SPEC: NORMAL
PATH REPORT.FINAL DX SPEC: NORMAL
PATH REPORT.GROSS SPEC: NORMAL
PATH REPORT.RELEVANT HX SPEC: NORMAL
PATH REPORT.TOTAL CANCER: NORMAL

## 2025-02-03 ENCOUNTER — TELEPHONE (OUTPATIENT)
Dept: GYNECOLOGIC ONCOLOGY | Facility: HOSPITAL | Age: 65
End: 2025-02-03
Payer: COMMERCIAL

## 2025-02-03 NOTE — TELEPHONE ENCOUNTER
Phoned patient in follow up to message received reporting increased foul smelling, gray vaginal discharge, pelvic cramping and lower back pain.    Patient denies urinary symptoms, fever, vaginal itching/irritation.  S/p CKC and laser of vulva on 1/22/25.     Appointment scheduled with Nancy Patricio CNP on 2/6/25 at 11:40 [facility] location.     Advised patient to proceed to ER if she develops increased vaginal drainage, pelvic/back pain or fever.   Patient verbalized her understanding of information given.

## 2025-02-06 ENCOUNTER — OFFICE VISIT (OUTPATIENT)
Dept: GYNECOLOGIC ONCOLOGY | Facility: CLINIC | Age: 65
End: 2025-02-06
Payer: COMMERCIAL

## 2025-02-06 VITALS
BODY MASS INDEX: 28.14 KG/M2 | OXYGEN SATURATION: 94 % | WEIGHT: 139.33 LBS | DIASTOLIC BLOOD PRESSURE: 64 MMHG | RESPIRATION RATE: 18 BRPM | TEMPERATURE: 96.8 F | SYSTOLIC BLOOD PRESSURE: 98 MMHG | HEART RATE: 63 BPM

## 2025-02-06 DIAGNOSIS — N76.2 CELLULITIS OF LABIA: ICD-10-CM

## 2025-02-06 DIAGNOSIS — N76.0 BV (BACTERIAL VAGINOSIS): ICD-10-CM

## 2025-02-06 DIAGNOSIS — Z98.890 POSTOPERATIVE STATE: ICD-10-CM

## 2025-02-06 DIAGNOSIS — B96.89 BV (BACTERIAL VAGINOSIS): ICD-10-CM

## 2025-02-06 DIAGNOSIS — N90.3 VULVAR DYSPLASIA: Primary | ICD-10-CM

## 2025-02-06 PROCEDURE — 3074F SYST BP LT 130 MM HG: CPT | Performed by: NURSE PRACTITIONER

## 2025-02-06 PROCEDURE — 3078F DIAST BP <80 MM HG: CPT | Performed by: NURSE PRACTITIONER

## 2025-02-06 PROCEDURE — 99211 OFF/OP EST MAY X REQ PHY/QHP: CPT | Performed by: NURSE PRACTITIONER

## 2025-02-06 RX ORDER — CEPHALEXIN 500 MG/1
500 CAPSULE ORAL 4 TIMES DAILY
Qty: 28 CAPSULE | Refills: 0 | Status: SHIPPED | OUTPATIENT
Start: 2025-02-06 | End: 2025-02-13

## 2025-02-06 RX ORDER — ACETAMINOPHEN 500 MG
1000 TABLET ORAL EVERY 6 HOURS PRN
Qty: 30 TABLET | Refills: 0 | Status: SHIPPED | OUTPATIENT
Start: 2025-02-06

## 2025-02-06 RX ORDER — IBUPROFEN 600 MG/1
600 TABLET ORAL EVERY 6 HOURS PRN
Qty: 30 TABLET | Refills: 1 | Status: SHIPPED | OUTPATIENT
Start: 2025-02-06

## 2025-02-06 RX ORDER — METRONIDAZOLE 500 MG/1
500 TABLET ORAL 2 TIMES DAILY
Qty: 14 TABLET | Refills: 0 | Status: SHIPPED | OUTPATIENT
Start: 2025-02-06 | End: 2025-02-13

## 2025-02-06 RX ORDER — SENNOSIDES 25 MG/1
TABLET, FILM COATED ORAL
Qty: 28 G | Refills: 1 | Status: SHIPPED | OUTPATIENT
Start: 2025-02-06

## 2025-02-06 ASSESSMENT — PAIN SCALES - GENERAL: PAINLEVEL_OUTOF10: 5

## 2025-02-06 NOTE — PROGRESS NOTES
Patient ID: Amee You is a 64 y.o. female.  Referring Physician: No referring provider defined for this encounter.  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 S/p examination under anesthesia, CO2 laser ablation of vulvar lesions, cervical conization with endocervical curettage and anoscopy on 1/22. Reports overall doing well since surgery however has been having moderate amount of grey vaginal discharge and pain near vulvar incisions. Denies any bladder changes.  Regular stools without pain or blood. Quit smoking one month ago. Denies nausea/vomiting, fevers, chills, chest pain or shortness of breath. Occasional pelvic cramping as well as chronic lower back pain for which she is receiving steroid injections with some improvement. No other concerns; denies recent weight changes.      A comprehensive review of systems was performed and otherwise negative.       Objective    BSA: 1.62 meters squared  BP 98/64   Pulse 63   Temp 36 °C (96.8 °F)   Resp 18   Wt 63.2 kg (139 lb 5.3 oz)   SpO2 94%   BMI 28.14 kg/m²      Physical Exam  Vitals and nursing note reviewed. Exam conducted with a chaperone present.   Constitutional:       General: She is not in acute distress.     Appearance: Normal appearance.   HENT:      Head: Normocephalic and atraumatic.      Mouth/Throat:      Mouth: Mucous membranes are moist.      Pharynx: No oropharyngeal exudate or posterior oropharyngeal erythema.   Eyes:      Extraocular Movements: Extraocular movements intact.      Conjunctiva/sclera: Conjunctivae normal.      Pupils: Pupils are equal, round, and reactive to light.   Neck:      Thyroid: No thyroid mass or thyromegaly.   Cardiovascular:      Rate and Rhythm: Normal rate and regular rhythm.      Pulses: Normal pulses.      Heart sounds: Normal heart sounds.    Pulmonary:      Effort: Pulmonary effort is normal.      Breath sounds: Wheezing present. No rhonchi or rales.   Abdominal:      General: Bowel sounds are normal. There is no distension.      Palpations: Abdomen is soft. There is no mass.      Tenderness: There is no abdominal tenderness.      Hernia: No hernia is present.   Genitourinary:     Labia:         Right: Lesion present.         Left: Lesion present.       Vagina: Normal.          Comments: Superior periclitoral incision healing with exposed suture noted. Posterior vulva excisions sites with surround erythema and purulent drainage. Cervix consistent with CKC, stitch intact. Moderate amount of green discharge present.   Musculoskeletal:         General: No tenderness. Normal range of motion.      Cervical back: Normal range of motion and neck supple. No tenderness.      Right lower leg: No edema.      Left lower leg: No edema.   Lymphadenopathy:      Lower Body: No right inguinal adenopathy. No left inguinal adenopathy.   Skin:     General: Skin is warm.      Findings: No lesion or rash.   Neurological:      General: No focal deficit present.      Mental Status: She is alert and oriented to person, place, and time.   Psychiatric:         Mood and Affect: Mood normal.         Behavior: Behavior normal.     Pathology: 10/16/24  A. PERICLITORAL LESION, STITCH AT 12:00, RESECTION:   -- HIGH GRADE SQUAMOUS INTRAEPITHELIAL LESION (HATTIE 3, USUAL AND WARTY TYPES)  --MARGIN OF RESECTION FROM 4-9 O'CLOCK IS POSITIVE  --PERIURETHRAL MARGIN NEGATIVE  B.  RIGHT VULVAR BIOPSY 7:00:  -- HIGH GRADE SQUAMOUS INTRAEPITHELIAL LESION (HATTIE 3, USUAL AND WARTY TYPE)   C.  LEFT VULVAR BIOPSY 4:00:   -- HIGH GRADE SQUAMOUS INTRAEPITHELIAL LESION (HATTIE 3, USUAL TYPE).     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 Medical comorbidities: COPD with 1PPD tobacco use, cardiomyopathy with thoracic outlet syndrome. ECOG 1.     Postoperative state  Continue post op vulvar hygiene, alternate Tylenol and Ibuprofen for pain management, Rx sent to pharmacy, can use topical lidocaine mixed with Aquaphor as well  - acetaminophen (Tylenol) 500 mg tablet; Take 2 tablets (1,000 mg) by mouth every 6 hours if needed for mild pain (1 - 3).  Dispense: 30 tablet; Refill: 0  - ibuprofen 600 mg tablet; Take 1 tablet (600 mg) by mouth every 6 hours if needed for mild pain (1 - 3).  Dispense: 30 tablet; Refill: 1  - lidocaine (Xylocaine) 5 % cream cream; Apply pea sized amount to area twice a day PRN pain  Dispense: 28 g; Refill: 1    BV (bacterial vaginosis)  - metroNIDAZOLE (Flagyl) 500 mg tablet; Take 1 tablet (500 mg) by mouth 2 times a day for 7 days.  Dispense: 14 tablet; Refill: 0    Cellulitis of labia  Unable to take Bactrim with Flagyl due to med interaction, start Keflex  - cephalexin (Keflex) 500 mg capsule; Take 1 capsule (500 mg) by mouth 4 times a day for 7 days.  Dispense: 28 capsule; Refill: 0    Follow up with Dr. Vanegas for post op visit as scheduled    Call office with any concerning symptoms    Nancy Patricio, APRN-CNP

## 2025-02-07 DIAGNOSIS — E78.5 HYPERLIPIDEMIA, UNSPECIFIED HYPERLIPIDEMIA TYPE: ICD-10-CM

## 2025-02-07 RX ORDER — ATORVASTATIN CALCIUM 40 MG/1
TABLET, FILM COATED ORAL
Qty: 90 TABLET | Refills: 0 | Status: SHIPPED | OUTPATIENT
Start: 2025-02-07

## 2025-02-10 DIAGNOSIS — K21.9 GASTROESOPHAGEAL REFLUX DISEASE, UNSPECIFIED WHETHER ESOPHAGITIS PRESENT: ICD-10-CM

## 2025-02-11 RX ORDER — PANTOPRAZOLE SODIUM 40 MG/1
40 TABLET, DELAYED RELEASE ORAL
Qty: 90 TABLET | Refills: 1 | Status: SHIPPED | OUTPATIENT
Start: 2025-02-11

## 2025-02-19 ENCOUNTER — APPOINTMENT (OUTPATIENT)
Dept: PRIMARY CARE | Facility: CLINIC | Age: 65
End: 2025-02-19
Payer: COMMERCIAL

## 2025-02-19 NOTE — PROGRESS NOTES
Collaborative Care (CoCM)  Progress Note    Type of Interaction: Phone    Start Time: 12:30p    End Time: 1:30p        Appointment: Scheduled    Reason for Visit:   Chief Complaint   Patient presents with    PTSD (Post-Traumatic Stress Disorder)          Interval History / Patient Symptoms: patient reports she is in a lot of pain after her surgery. Patient has been getting hot flashes since her surgery. Patient reports her mental status is feeling down because of the surgery she recently had. Patient is not sure if the med increase helped her.  Patient has been reaching out to her providers for support. Patient has been on an increased dose of wellbutrin. Patient has noticed a change in how she is feeling physically. Patient reports she is feeling worse than she did when she did in the past. Patient feels like she is not able to get up and moving so much sooner.  Patient has discomfort in her lower body and trying to get comfortable is frustrating to her.  Patient is unable to do the sitz bath she did in the past which is causing her healing process to be longer. Patient has not smoked in 6 weeks. Patient is feeling amazing, she notices her breathing is better and her cardiovascular.  Patient has been feeling dizzy, [facility] encouraged her to let her PCP know, patient to reach out and send her PCP a message. Patient feels at ease and comfortable in her own space at home. Patient works on not engaging with others that are toxic or negative.     No data recorded      Interventions Provided: [place] Setting, Behavioral Activation, Develop Coping Strategies, Review Progress/Goals Stress Management, and Homework F/U      Progress Made: Mixed    Response to Intervention:   Patient is open and engaging. Patient is self reflective and open to processing.          Follow Up / Next Appointment:    3/5/2025@12:30p

## 2025-02-24 DIAGNOSIS — I10 HTN (HYPERTENSION), BENIGN: ICD-10-CM

## 2025-02-24 RX ORDER — RAMIPRIL 10 MG/1
10 CAPSULE ORAL DAILY
Qty: 90 CAPSULE | Refills: 1 | Status: SHIPPED | OUTPATIENT
Start: 2025-02-24

## 2025-02-28 ENCOUNTER — DOCUMENTATION (OUTPATIENT)
Dept: PRIMARY CARE | Facility: CLINIC | Age: 65
End: 2025-02-28
Payer: COMMERCIAL

## 2025-02-28 DIAGNOSIS — F43.10 PTSD (POST-TRAUMATIC STRESS DISORDER): Primary | ICD-10-CM

## 2025-02-28 PROCEDURE — 99493 SBSQ PSYC COLLAB CARE MGMT: CPT | Performed by: FAMILY MEDICINE

## 2025-03-02 DIAGNOSIS — I42.9 CARDIOMYOPATHY, UNSPECIFIED TYPE (MULTI): ICD-10-CM

## 2025-03-03 RX ORDER — CARVEDILOL 6.25 MG/1
TABLET ORAL
Qty: 360 TABLET | Refills: 1 | Status: SHIPPED | OUTPATIENT
Start: 2025-03-03

## 2025-03-03 NOTE — PROGRESS NOTES
No chief complaint on file.      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 She has been treated by Dr. Enriquez in the past with botox injections. she has not noticed any new lesions or changes. Her back has been hurting more than her baseline since her surgery. She is having 6-7 loose watery BM's per day. She started taking Imodium and is now moving her bowels 1-2 times per day.     She is s/p HRA on 1/22/25.     HIV status: negative  Smoking status: current smoker- 1ppd    Previous High Resolution Anoscopies and Cytologies have shown:     Colonoscopy 8/25/21 ([facility])- Complete to ileocecal valve. No polyps or other abnormalities seen.   8/2021 HRA: LSIL & HSIL   10/2021 HRA: Focally invasive moderately differentiated scc arising from a background of HSIL   3/2023 HRA: HSIL  10/2024 HRA: No path from anus; Vulvar HSIL  1/2025 HRA: HSIL     Past Medical History  She  Past Medical History:   Diagnosis Date    Anal dysplasia     Seen by Dr. Josie Lepe on 09/03/24    Anxiety     Arthritis     hands, knees    Cardiomyopathy 06/15/2015    CHF (congestive heart failure)     Depression 2011    GERD (gastroesophageal reflux disease)     controlled    History of substance abuse (Multi)     Hx of thoracic outlet syndrome     Hyperlipidemia 07/02/2012    Hypertension     Insomnia     Lumbosacral spondylosis 11/06/2024    Moderate episode of recurrent major depressive disorder     Obesity 2011    PTSD (post-traumatic stress disorder)     Snoring     Subclavian artery stenosis, left     s/p Carotid subclavian bypass in 2012    Thoracic outlet syndrome     Vulvar dysplasia     Seen by Dr. Reba Vanegas on 09/09/24       Surgical History  She  Past Surgical History:   Procedure Laterality Date    ANOSCOPY  09/20/2010    BIOPSY  2006    BIOPSY VULVA    COLONOSCOPY      FLEXIBLE SIGMOIDOSCOPY  01/22/2016    FLEXIBLE SIGMOIDOSCOPY   06/20/2014    KNEE ARTHROPLASTY Right     OTHER SURGICAL HISTORY  11/17/2012    Carotid subclavian bypass    OTHER SURGICAL HISTORY  10/10/2011    Examination under anesthesia, high-resolution anoscopy, biopsy of four condyloma acuminata from the anal canal and destruction of circumferential anal and perianal condyloma acuminata using a combination of infrared coagulation and electrocautery    TUBAL LIGATION  1994        Social History  She reports that she quit smoking about 8 weeks ago. Her smoking use included cigarettes. She has never used smokeless tobacco. She reports that she does not currently use alcohol. She reports that she does not currently use drugs after having used the following drugs: Cocaine.    Family History  Family History   Problem Relation Name Age of Onset    Ovarian cancer Mother Mom     Stroke Mother Mom     Arthritis Mother Mom     Cancer Mother Mom     Heart attack Father Dad     Hypertension Father Dad     Cancer Father Dad     Heart disease Father Dad     Brain cancer Brother      Breast cancer Maternal Grandmother Grandpa     Diabetes Paternal Grandmother Grandma     Hearing loss Paternal Grandfather Grandpa     Alcohol abuse Brother Ronald     Cancer Brother Ronald     Cancer Mother's Brother Reagan     COPD Sister Vitaly         Allergies  Pregabalin and Sulfamethoxazole    Home Medications  Prior to Admission medications    Medication Sig Start Date End Date Taking? Authorizing Provider   acetaminophen (Tylenol) 325 mg tablet if needed. 3/10/23   Historical Provider, MD   aspirin 81 mg EC tablet Take 1 tablet (81 mg) by mouth once daily. 6/29/21   Historical Provider, MD   atorvastatin (Lipitor) 40 mg tablet Take 1 tablet (40 mg) by mouth once daily at bedtime. 8/27/24   Dari Negro MD   buPROPion XL (Wellbutrin XL) 300 mg 24 hr tablet Take 1 tablet (300 mg) by mouth once daily in the morning. Do not crush, chew, or split. 4/4/24   Dari Negro MD   carvedilol (Coreg) 6.25 mg tablet Take 2  "tablets (12.5 mg) by mouth 2 times a day. 3/5/24   Dari Negro MD   escitalopram (Lexapro) 20 mg tablet Take 1 tablet (20 mg) by mouth once daily. 4/4/24   Dari Negro MD   furosemide (Lasix) 40 mg tablet Take 1 tablet (40 mg) by mouth if needed (swelling and \"water retention\"). 10/16/19   Historical Provider, MD   hydrOXYzine HCL (Atarax) 10 mg tablet Take by mouth if needed for itching.    Historical Provider, MD   lidocaine-hydrocortisone-aloe 3-2.5 % (7 gram) kit Insert into the rectum. Takes as needed after anal surgeries 7/16/21   Historical Provider, MD   meloxicam (Mobic) 15 mg tablet  2/13/24   Historical Provider, MD   pantoprazole (ProtoNix) 40 mg EC tablet TAKE 1 TABLET(40 MG) BY MOUTH DAILY. DO NOT CRUSH, CHEW, OR SPLIT 7/26/24   Dari Negro MD   ramipril (Altace) 10 mg capsule TAKE 1 CAPSULE(10 MG) BY MOUTH DAILY 7/29/24   Dari Negro MD   atorvastatin (Lipitor) 40 mg tablet Take 1 tablet (40 mg) by mouth once daily at bedtime.  8/27/24  Historical Provider, MD       Review of Systems     Physical Exam    Perineal/UMAIR:  Perineum:   UMAIR: WNL  Tone: WNL     Procedures    Last Recorded Vitals  There were no vitals taken for this visit.      Assessment and Plan  64 y.o. female here for follow up of anal dysplasia. Healing well. Follow up 6 months   Great job quitting smoking!    "

## 2025-03-04 ENCOUNTER — OFFICE VISIT (OUTPATIENT)
Dept: SURGERY | Facility: CLINIC | Age: 65
End: 2025-03-04
Payer: COMMERCIAL

## 2025-03-04 VITALS
SYSTOLIC BLOOD PRESSURE: 99 MMHG | WEIGHT: 145 LBS | BODY MASS INDEX: 29.29 KG/M2 | DIASTOLIC BLOOD PRESSURE: 61 MMHG | HEART RATE: 82 BPM

## 2025-03-04 DIAGNOSIS — R19.7 DIARRHEA, UNSPECIFIED TYPE: ICD-10-CM

## 2025-03-04 PROCEDURE — 99213 OFFICE O/P EST LOW 20 MIN: CPT | Performed by: SURGERY

## 2025-03-04 PROCEDURE — 3078F DIAST BP <80 MM HG: CPT | Performed by: SURGERY

## 2025-03-04 PROCEDURE — 3074F SYST BP LT 130 MM HG: CPT | Performed by: SURGERY

## 2025-03-05 ENCOUNTER — APPOINTMENT (OUTPATIENT)
Dept: PRIMARY CARE | Facility: CLINIC | Age: 65
End: 2025-03-05
Payer: COMMERCIAL

## 2025-03-05 ENCOUNTER — OFFICE VISIT (OUTPATIENT)
Dept: PRIMARY CARE | Facility: CLINIC | Age: 65
End: 2025-03-05
Payer: COMMERCIAL

## 2025-03-05 VITALS
DIASTOLIC BLOOD PRESSURE: 62 MMHG | HEART RATE: 62 BPM | SYSTOLIC BLOOD PRESSURE: 100 MMHG | TEMPERATURE: 97.5 F | HEIGHT: 59 IN | BODY MASS INDEX: 29.15 KG/M2 | OXYGEN SATURATION: 97 % | WEIGHT: 144.6 LBS

## 2025-03-05 DIAGNOSIS — R14.0 BLOATING: ICD-10-CM

## 2025-03-05 DIAGNOSIS — Z80.41 FAMILY HISTORY OF OVARIAN CANCER: ICD-10-CM

## 2025-03-05 DIAGNOSIS — I95.89 OTHER SPECIFIED HYPOTENSION: Primary | ICD-10-CM

## 2025-03-05 PROBLEM — I95.9 ARTERIAL HYPOTENSION: Status: ACTIVE | Noted: 2025-03-05

## 2025-03-05 PROBLEM — L30.9 DERMATITIS: Status: RESOLVED | Noted: 2023-06-30 | Resolved: 2025-03-05

## 2025-03-05 PROCEDURE — 99214 OFFICE O/P EST MOD 30 MIN: CPT | Performed by: FAMILY MEDICINE

## 2025-03-05 PROCEDURE — 3078F DIAST BP <80 MM HG: CPT | Performed by: FAMILY MEDICINE

## 2025-03-05 PROCEDURE — 3008F BODY MASS INDEX DOCD: CPT | Performed by: FAMILY MEDICINE

## 2025-03-05 PROCEDURE — 3074F SYST BP LT 130 MM HG: CPT | Performed by: FAMILY MEDICINE

## 2025-03-05 RX ORDER — TOPIRAMATE 50 MG/1
1 TABLET, FILM COATED ORAL
COMMUNITY
Start: 2025-02-13

## 2025-03-05 ASSESSMENT — PATIENT HEALTH QUESTIONNAIRE - PHQ9
SUM OF ALL RESPONSES TO PHQ9 QUESTIONS 1 AND 2: 6
6. FEELING BAD ABOUT YOURSELF - OR THAT YOU ARE A FAILURE OR HAVE LET YOURSELF OR YOUR FAMILY DOWN: NOT AT ALL
3. TROUBLE FALLING OR STAYING ASLEEP OR SLEEPING TOO MUCH: NEARLY EVERY DAY
SUM OF ALL RESPONSES TO PHQ QUESTIONS 1-9: 19
1. LITTLE INTEREST OR PLEASURE IN DOING THINGS: NEARLY EVERY DAY
4. FEELING TIRED OR HAVING LITTLE ENERGY: NEARLY EVERY DAY
5. POOR APPETITE OR OVEREATING: NEARLY EVERY DAY
9. THOUGHTS THAT YOU WOULD BE BETTER OFF DEAD, OR OF HURTING YOURSELF: NOT AT ALL
10. IF YOU CHECKED OFF ANY PROBLEMS, HOW DIFFICULT HAVE THESE PROBLEMS MADE IT FOR YOU TO DO YOUR WORK, TAKE CARE OF THINGS AT HOME, OR GET ALONG WITH OTHER PEOPLE: EXTREMELY DIFFICULT
8. MOVING OR SPEAKING SO SLOWLY THAT OTHER PEOPLE COULD HAVE NOTICED. OR THE OPPOSITE, BEING SO FIGETY OR RESTLESS THAT YOU HAVE BEEN MOVING AROUND A LOT MORE THAN USUAL: SEVERAL DAYS
2. FEELING DOWN, DEPRESSED OR HOPELESS: NEARLY EVERY DAY
7. TROUBLE CONCENTRATING ON THINGS, SUCH AS READING THE NEWSPAPER OR WATCHING TELEVISION: NEARLY EVERY DAY

## 2025-03-05 NOTE — PROGRESS NOTES
"Subjective   Patient ID: Amee You is a 64 y.o. female who presents for Hypotension.    HPI   Has noticed low BP for last month or so.  Gets weak and lightheaded as well.  Does report that since procedure has been having watery stools, only stablize with use of Immodium.  Saw colorectal yesterday and recommended C diff testing as also was on antibiotics.     Denies any other changes in medications.  Had recent cardiac testing which was all normal.  Does report that maybe not drinking as much fluids.    Has ongoing bloating and discomfort in abdomen.  Concerned because of family history of ovarian cancer.  Review of Systems  Negative unless noted in HPI    Objective   /62 (BP Location: Right arm, Patient Position: Sitting, BP Cuff Size: Adult)   Pulse 62   Temp 36.4 °C (97.5 °F) (Temporal)   Ht 1.499 m (4' 11\")   Wt 65.6 kg (144 lb 9.6 oz)   SpO2 97%   BMI 29.21 kg/m²     Physical Exam  Constitutional:       Appearance: Normal appearance.   Cardiovascular:      Rate and Rhythm: Normal rate and regular rhythm.   Pulmonary:      Effort: Pulmonary effort is normal.   Abdominal:      Palpations: Abdomen is soft.   Neurological:      Mental Status: She is alert.   Psychiatric:         Mood and Affect: Mood normal.         Assessment/Plan   Problem List Items Addressed This Visit             ICD-10-CM    Arterial hypotension - Primary I95.9     Low BP noted  Discussed that diarrhea may be contributing  Get Cdiff study as ordered by GI  Obtain labs for risk assessment  Recommend holding Ramipril for now until BP able to stabilize  Increase hydration  Monitor for signs/symptoms of infection         Relevant Orders    CBC    Comprehensive Metabolic Panel     Other Visit Diagnoses         Codes    Bloating     R14.0    Relevant Orders    CT abdomen pelvis w IV contrast    Family history of ovarian cancer     Z80.41    Relevant Orders    CT abdomen pelvis w IV contrast               "

## 2025-03-05 NOTE — PROGRESS NOTES
Collaborative Care (CoCM)  Progress Note    Type of Interaction: In Office    Start Time: 12:30p    End Time: 1:30p        Appointment: Scheduled    Reason for Visit:   Chief Complaint   Patient presents with    PTSD (Post-Traumatic Stress Disorder)          Interval History / Patient Symptoms: Patient states all of the medical things she is dealing with is straining/draining for her and she has little support from family. Patient states she is only able to get small things taken care of at home. Patient becomes fatigued easily and her back is hurting her a lot. Patient made an appointment to see her PCP today. Patient is not sleeping well because of her pain, she is getting a nerve block on March 17th. Patient reports bloating and she is not eating well.  Patient enjoys doing crafts. Patient enjoyed painting in the past. Patient processed how the discord with her adult kids continues to impact her. Patient is tearful. Patient processes how her behaviors while drinking impacted her kids lives. Patient and BHM discussed how she tracey with the loss of connection with her kids.     No data recorded      Interventions Provided: [place] Setting, Behavioral Activation, Develop Coping Strategies, and Review Progress/Goals Stress Management      Progress Made: Mixed    Response to Intervention: patient is open and engaging. Patient is reflective. Patient to revisit doing arts and crafts, she is also looking for a part time job to get her out of the home as well              Follow Up / Next Appointment:    3/19/2025@12:30p

## 2025-03-06 LAB
ALBUMIN SERPL-MCNC: 4.1 G/DL (ref 3.6–5.1)
ALP SERPL-CCNC: 84 U/L (ref 37–153)
ALT SERPL-CCNC: 11 U/L (ref 6–29)
ANION GAP SERPL CALCULATED.4IONS-SCNC: 9 MMOL/L (CALC) (ref 7–17)
AST SERPL-CCNC: 15 U/L (ref 10–35)
BILIRUB SERPL-MCNC: 0.4 MG/DL (ref 0.2–1.2)
BUN SERPL-MCNC: 7 MG/DL (ref 7–25)
CALCIUM SERPL-MCNC: 9.3 MG/DL (ref 8.6–10.4)
CHLORIDE SERPL-SCNC: 109 MMOL/L (ref 98–110)
CO2 SERPL-SCNC: 27 MMOL/L (ref 20–32)
CREAT SERPL-MCNC: 0.73 MG/DL (ref 0.5–1.05)
EGFRCR SERPLBLD CKD-EPI 2021: 92 ML/MIN/1.73M2
ERYTHROCYTE [DISTWIDTH] IN BLOOD BY AUTOMATED COUNT: 13.1 % (ref 11–15)
GLUCOSE SERPL-MCNC: 82 MG/DL (ref 65–99)
HCT VFR BLD AUTO: 39.5 % (ref 35–45)
HGB BLD-MCNC: 12.9 G/DL (ref 11.7–15.5)
MCH RBC QN AUTO: 31.1 PG (ref 27–33)
MCHC RBC AUTO-ENTMCNC: 32.7 G/DL (ref 32–36)
MCV RBC AUTO: 95.2 FL (ref 80–100)
PLATELET # BLD AUTO: 208 THOUSAND/UL (ref 140–400)
PMV BLD REES-ECKER: 11.1 FL (ref 7.5–12.5)
POTASSIUM SERPL-SCNC: 4.5 MMOL/L (ref 3.5–5.3)
PROT SERPL-MCNC: 6.1 G/DL (ref 6.1–8.1)
RBC # BLD AUTO: 4.15 MILLION/UL (ref 3.8–5.1)
SODIUM SERPL-SCNC: 145 MMOL/L (ref 135–146)
WBC # BLD AUTO: 7.3 THOUSAND/UL (ref 3.8–10.8)

## 2025-03-06 NOTE — ASSESSMENT & PLAN NOTE
Low BP noted  Discussed that diarrhea may be contributing  Get Cdiff study as ordered by GI  Obtain labs for risk assessment  Recommend holding Ramipril for now until BP able to stabilize  Increase hydration  Monitor for signs/symptoms of infection

## 2025-03-19 ENCOUNTER — APPOINTMENT (OUTPATIENT)
Dept: PRIMARY CARE | Facility: CLINIC | Age: 65
End: 2025-03-19
Payer: COMMERCIAL

## 2025-03-19 ASSESSMENT — PATIENT HEALTH QUESTIONNAIRE - PHQ9
2. FEELING DOWN, DEPRESSED OR HOPELESS: SEVERAL DAYS
4. FEELING TIRED OR HAVING LITTLE ENERGY: NOT AT ALL
SUM OF ALL RESPONSES TO PHQ9 QUESTIONS 1 & 2: 2
5. POOR APPETITE OR OVEREATING: NOT AT ALL
9. THOUGHTS THAT YOU WOULD BE BETTER OFF DEAD, OR OF HURTING YOURSELF: NOT AT ALL
6. FEELING BAD ABOUT YOURSELF - OR THAT YOU ARE A FAILURE OR HAVE LET YOURSELF OR YOUR FAMILY DOWN: SEVERAL DAYS
8. MOVING OR SPEAKING SO SLOWLY THAT OTHER PEOPLE COULD HAVE NOTICED. OR THE OPPOSITE, BEING SO FIGETY OR RESTLESS THAT YOU HAVE BEEN MOVING AROUND A LOT MORE THAN USUAL: NOT AT ALL
7. TROUBLE CONCENTRATING ON THINGS, SUCH AS READING THE NEWSPAPER OR WATCHING TELEVISION: NOT AT ALL
3. TROUBLE FALLING OR STAYING ASLEEP: NOT AT ALL
SUM OF ALL RESPONSES TO PHQ QUESTIONS 1-9: 3
1. LITTLE INTEREST OR PLEASURE IN DOING THINGS: SEVERAL DAYS
10. IF YOU CHECKED OFF ANY PROBLEMS, HOW DIFFICULT HAVE THESE PROBLEMS MADE IT FOR YOU TO DO YOUR WORK, TAKE CARE OF THINGS AT HOME, OR GET ALONG WITH OTHER PEOPLE: SOMEWHAT DIFFICULT

## 2025-03-19 ASSESSMENT — ANXIETY QUESTIONNAIRES
1. FEELING NERVOUS, ANXIOUS, OR ON EDGE: SEVERAL DAYS
IF YOU CHECKED OFF ANY PROBLEMS ON THIS QUESTIONNAIRE, HOW DIFFICULT HAVE THESE PROBLEMS MADE IT FOR YOU TO DO YOUR WORK, TAKE CARE OF THINGS AT HOME, OR GET ALONG WITH OTHER PEOPLE: SOMEWHAT DIFFICULT
6. BECOMING EASILY ANNOYED OR IRRITABLE: SEVERAL DAYS
5. BEING SO RESTLESS THAT IT IS HARD TO SIT STILL: NOT AT ALL
3. WORRYING TOO MUCH ABOUT DIFFERENT THINGS: NOT AT ALL
7. FEELING AFRAID AS IF SOMETHING AWFUL MIGHT HAPPEN: NOT AT ALL
2. NOT BEING ABLE TO STOP OR CONTROL WORRYING: SEVERAL DAYS
4. TROUBLE RELAXING: SEVERAL DAYS
GAD7 TOTAL SCORE: 4

## 2025-03-19 NOTE — PROGRESS NOTES
Collaborative Care (CoCM)  Progress Note    Type of Interaction: In Office    Start Time: 12:20p    End Time: 1:20p        Appointment: Scheduled    Reason for Visit:   Chief Complaint   Patient presents with    PTSD (Post-Traumatic Stress Disorder)          Interval History / Patient Symptoms: patient is getting ready to go on vacation. Patient is getting to bed earlier and is on a routine to get on a schedule. Patient is feeling like her mood is better.  Patient is able to get more things accomplished during the day.  Patient does get tired during the day. Patient has been pushing herself to do more often. Patient is looking to do more outside walks, work part-time or [employer] and get back to doing some of her crafting. Patient to work on creating a space that is tranquil so that she can come outside of her room and begin crafting more often. Patient to complete strengths list.     Patient Health Questionnaire-9 Score: 19 (3/5/2025  1:44 PM)        Interventions Provided: [place] Setting, Behavioral Activation, Strengths Exploration, Develop Coping Strategies, Review Progress/Goals Stress Management, and Mindfulness      Progress Made: Mixed    Response to Intervention: patient is open engaging. Patient is self reflective. Patient to work on putting her needs first and to focus on what she finds zackary in.              Follow Up / Next Appointment:    4/9/2025@12:15p

## 2025-03-21 ENCOUNTER — HOSPITAL ENCOUNTER (OUTPATIENT)
Dept: RADIOLOGY | Facility: HOSPITAL | Age: 65
Discharge: HOME | End: 2025-03-21
Payer: COMMERCIAL

## 2025-03-21 DIAGNOSIS — Z80.41 FAMILY HISTORY OF OVARIAN CANCER: ICD-10-CM

## 2025-03-21 DIAGNOSIS — R14.0 BLOATING: ICD-10-CM

## 2025-03-21 PROCEDURE — 74177 CT ABD & PELVIS W/CONTRAST: CPT

## 2025-03-21 PROCEDURE — 2550000001 HC RX 255 CONTRASTS: Performed by: FAMILY MEDICINE

## 2025-03-21 RX ADMIN — IOHEXOL 75 ML: 350 INJECTION, SOLUTION INTRAVENOUS at 13:37

## 2025-03-26 ENCOUNTER — DOCUMENTATION (OUTPATIENT)
Dept: PRIMARY CARE | Facility: CLINIC | Age: 65
End: 2025-03-26
Payer: COMMERCIAL

## 2025-03-26 DIAGNOSIS — F43.10 PTSD (POST-TRAUMATIC STRESS DISORDER): Primary | ICD-10-CM

## 2025-03-26 PROCEDURE — 99494 1ST/SBSQ PSYC COLLAB CARE: CPT | Performed by: FAMILY MEDICINE

## 2025-03-26 PROCEDURE — 99493 SBSQ PSYC COLLAB CARE MGMT: CPT | Performed by: FAMILY MEDICINE

## 2025-04-09 ENCOUNTER — APPOINTMENT (OUTPATIENT)
Dept: PRIMARY CARE | Facility: CLINIC | Age: 65
End: 2025-04-09
Payer: COMMERCIAL

## 2025-04-09 NOTE — PROGRESS NOTES
Collaborative Care (CoCM)  Progress Note    Type of Interaction: In Office    Start Time: 12:45p    End Time: 1:45p        Appointment: Scheduled    Reason for Visit:   Chief Complaint   Patient presents with    PTSD (Post-Traumatic Stress Disorder)          Interval History / Patient Symptoms:   Patient just came back from vacation and had a great time. Patient reconnected with a friend. Patient was encouraged to initiate contact with friends. Patient talked at length with her friend about her marriage. Patient got home and said she felt like she didn't belong, she feels like an outsider.  Patient has a close friend that is so supportive of her. Patient feels 'stuck' in her marriage and she doesn't know what she wants to do , she isn't working and feels trapped. Patient and M processed together what she is in control of and what she doesn't have control over in her life.   No data recorded      Interventions Provided: [place] Setting, Behavioral Activation, Develop Coping Strategies, and Review Progress/Goals Stress Management      Progress Made: Mixed    Response to Intervention: patient is open and engaging. Patient processes and does a lot of self reflection.                      Follow Up / Next Appointment:    4/30/2025@12:30p

## 2025-04-30 ENCOUNTER — DOCUMENTATION (OUTPATIENT)
Dept: PRIMARY CARE | Facility: CLINIC | Age: 65
End: 2025-04-30

## 2025-04-30 ENCOUNTER — APPOINTMENT (OUTPATIENT)
Dept: PRIMARY CARE | Facility: CLINIC | Age: 65
End: 2025-04-30
Payer: COMMERCIAL

## 2025-04-30 ENCOUNTER — TELEPHONE (OUTPATIENT)
Dept: PRIMARY CARE | Facility: CLINIC | Age: 65
End: 2025-04-30

## 2025-04-30 DIAGNOSIS — F43.10 PTSD (POST-TRAUMATIC STRESS DISORDER): Primary | ICD-10-CM

## 2025-04-30 DIAGNOSIS — F32.A DEPRESSIVE DISORDER: ICD-10-CM

## 2025-04-30 PROCEDURE — 99494 1ST/SBSQ PSYC COLLAB CARE: CPT | Performed by: FAMILY MEDICINE

## 2025-04-30 PROCEDURE — 99493 SBSQ PSYC COLLAB CARE MGMT: CPT | Performed by: FAMILY MEDICINE

## 2025-04-30 RX ORDER — BUPROPION HYDROCHLORIDE 150 MG/1
150 TABLET, EXTENDED RELEASE ORAL 3 TIMES DAILY
Qty: 160 TABLET | Refills: 1 | Status: SHIPPED | OUTPATIENT
Start: 2025-04-30 | End: 2025-04-30

## 2025-04-30 RX ORDER — BUPROPION HYDROCHLORIDE 300 MG/1
300 TABLET ORAL EVERY MORNING
Qty: 90 TABLET | Refills: 1 | Status: SHIPPED | OUTPATIENT
Start: 2025-04-30

## 2025-04-30 RX ORDER — BUPROPION HYDROCHLORIDE 150 MG/1
150 TABLET ORAL EVERY MORNING
Qty: 90 TABLET | Refills: 1 | Status: SHIPPED | OUTPATIENT
Start: 2025-04-30

## 2025-04-30 NOTE — PROGRESS NOTES
Collaborative Care (CoCM)  Progress Note    Type of Interaction: Phone    Start Time: 12:35p    End Time: 1:35p        Appointment: Scheduled    Reason for Visit: No chief complaint on file.   PTSD      Interval History / Patient Symptoms: patient has been feeling more symptoms of depression. Patient feels like she isn't welcome in her own home. Patient is not feeling well physically, her stomach is upset more recently. Patient feels more isolated from family members within her home. Patient states she is having physical reactions to when she is on edge, her stomach is reacting negatively and she is not wanting to leave the house because of how she is feeling physically. Patient has been putting in applications for jobs close to her home at local [employer]. Patient is now a , she pursued this on her own. Patient is feeling hopeful about this. Patient has been more self reflective and going back to her past and thinking about how things from her past have impacted her. Patient encouraged to journal how she is feeling. Patient states she has no motivation since she has gotten home from vacation.    No data recorded      Interventions Provided: [place] Setting, Behavioral Activation, Develop Coping Strategies, Review Progress/Goals Stress Management, and Mindfulness      Progress Made: Mixed    Response to Intervention: patient is open and engaging. Patient is working on regaining her independence back.  Patient attended 2 AA meetings and feels supported, she enjoys the group setting. Patient to reach out to a friend to spend time with her this weekend. Patient to find a female AA group for herself. Patient to reach out to [facility].             Follow Up / Next Appointment:    5/21/2025@

## 2025-04-30 NOTE — TELEPHONE ENCOUNTER
[facility] pharmacy LVM on behalf of pt stating insurance will not cover 150 mg of bupropion 3x a day. They stated we could either start a PA for this, or you can prescribe 150 mg once a day and 300 mg once a day to equal 450 mg daily. Which would you prefer?

## 2025-05-06 ENCOUNTER — TELEPHONE (OUTPATIENT)
Dept: PRIMARY CARE | Facility: CLINIC | Age: 65
End: 2025-05-06
Payer: COMMERCIAL

## 2025-05-06 DIAGNOSIS — F32.A DEPRESSIVE DISORDER: Primary | ICD-10-CM

## 2025-05-06 RX ORDER — BUPROPION HYDROCHLORIDE 150 MG/1
150 TABLET, EXTENDED RELEASE ORAL 3 TIMES DAILY
Qty: 180 TABLET | Refills: 3 | Status: SHIPPED | OUTPATIENT
Start: 2025-05-06

## 2025-05-06 NOTE — TELEPHONE ENCOUNTER
Spoke with pt, informed of message below. Verbalized understanding.     ----- Message from Dari Negro sent at 5/6/2025 12:09 PM EDT -----  Ok, since not tolerating then we can try to get the other dosing approved as it was not covered by insurance.  I would recommend that she for the time being go to only taking 300mg once daily of the XR and see how she feels.  JL  ----- Message -----  From: Dea Angela Alcantara  Sent: 5/6/2025  11:57 AM EDT  To: Dari Negro MD    Called and spoke with pt. She stated that meds were changed from bupropion SR to XL when changed to the 150 and 300 mg due to insurance. Thinks the XL is making her sick.  ----- Message -----  From: Dari Negro MD  Sent: 5/6/2025   9:58 AM EDT  To: Dea Angela Alcantara    Can you call pt and see what is going on with the medications?  ----- Message -----  From: FAITH Salomon  Sent: 5/6/2025   9:20 AM EDT  To: Dari Negro MD    I spoke to this patient today. She received her medication for her Wellbutrin from the pharmacy. The refill is for /300 mg in the morning.She was recently increased to 150 mg SR 3 times per day.She has noticed that her stomach is upset, a 'pit' in her stomach, she feels like she wants to throw up.Can you contact the patient and advise how she should proceed?I appreciate your feedback.Misty

## 2025-05-21 ENCOUNTER — APPOINTMENT (OUTPATIENT)
Dept: PRIMARY CARE | Facility: CLINIC | Age: 65
End: 2025-05-21
Payer: COMMERCIAL

## 2025-05-21 ASSESSMENT — ANXIETY QUESTIONNAIRES
5. BEING SO RESTLESS THAT IT IS HARD TO SIT STILL: NOT AT ALL
GAD7 TOTAL SCORE: 4
1. FEELING NERVOUS, ANXIOUS, OR ON EDGE: SEVERAL DAYS
3. WORRYING TOO MUCH ABOUT DIFFERENT THINGS: NOT AT ALL
IF YOU CHECKED OFF ANY PROBLEMS ON THIS QUESTIONNAIRE, HOW DIFFICULT HAVE THESE PROBLEMS MADE IT FOR YOU TO DO YOUR WORK, TAKE CARE OF THINGS AT HOME, OR GET ALONG WITH OTHER PEOPLE: SOMEWHAT DIFFICULT
7. FEELING AFRAID AS IF SOMETHING AWFUL MIGHT HAPPEN: NOT AT ALL
4. TROUBLE RELAXING: SEVERAL DAYS
6. BECOMING EASILY ANNOYED OR IRRITABLE: SEVERAL DAYS
2. NOT BEING ABLE TO STOP OR CONTROL WORRYING: SEVERAL DAYS

## 2025-05-21 ASSESSMENT — PATIENT HEALTH QUESTIONNAIRE - PHQ9
2. FEELING DOWN, DEPRESSED OR HOPELESS: SEVERAL DAYS
1. LITTLE INTEREST OR PLEASURE IN DOING THINGS: SEVERAL DAYS
8. MOVING OR SPEAKING SO SLOWLY THAT OTHER PEOPLE COULD HAVE NOTICED. OR THE OPPOSITE, BEING SO FIGETY OR RESTLESS THAT YOU HAVE BEEN MOVING AROUND A LOT MORE THAN USUAL: NOT AT ALL
SUM OF ALL RESPONSES TO PHQ QUESTIONS 1-9: 5
3. TROUBLE FALLING OR STAYING ASLEEP: SEVERAL DAYS
7. TROUBLE CONCENTRATING ON THINGS, SUCH AS READING THE NEWSPAPER OR WATCHING TELEVISION: NOT AT ALL
10. IF YOU CHECKED OFF ANY PROBLEMS, HOW DIFFICULT HAVE THESE PROBLEMS MADE IT FOR YOU TO DO YOUR WORK, TAKE CARE OF THINGS AT HOME, OR GET ALONG WITH OTHER PEOPLE: SOMEWHAT DIFFICULT
6. FEELING BAD ABOUT YOURSELF - OR THAT YOU ARE A FAILURE OR HAVE LET YOURSELF OR YOUR FAMILY DOWN: SEVERAL DAYS
SUM OF ALL RESPONSES TO PHQ9 QUESTIONS 1 & 2: 2
9. THOUGHTS THAT YOU WOULD BE BETTER OFF DEAD, OR OF HURTING YOURSELF: NOT AT ALL
4. FEELING TIRED OR HAVING LITTLE ENERGY: SEVERAL DAYS
5. POOR APPETITE OR OVEREATING: NOT AT ALL

## 2025-05-21 NOTE — PROGRESS NOTES
Collaborative Care (CoCM)  Progress Note    Type of Interaction: Phone    Start Time: 10:30a    End Time: 11:30a        Appointment: Scheduled    Reason for Visit: No chief complaint on file.   PTSD      Interval History / Patient Symptoms:   Patient is back on her meds for about 1 week. Patient reports no stomach issues since changing her meds. Patient states she feels on edge, stress because of the issues going on at her home. Patient reports she sleeps a lot and drained. Patient reports she has no energy to do anything, loss of interest. Patient has been doing some research on narcissists and the situation that she is in with her . Patient is becoming more aware of her relationship with her spouse.  Patient feels her spouse has sabotaged a lot of the friendships in her life. Patient often second guesses herself when she makes a decision. Patient often has a lack of confidence.  Patient does not feel rested when she wakes up in the morning. Patient states she isolates herself, she doesn't connect with others. Patient reports she feels her stomach gets into knots because of her stress. Patient is worried about her physical ailments. Patient is frustrated with not being able to leave home because of her stomach being upset. Patient has been taking meds for depression for most of her life. Patient states when she took Effexor, she found that it helped her.  Patient feels reluctant to begin new ventures. Patient recognizes that her self confidence and self worth at times because of her spouse's negative comments.   No data recorded      Interventions Provided: [place] Setting, Behavioral Activation, Motivational Interviewing, Develop Coping Strategies, Review Progress/Goals Stress Management, and Homework F/U      Progress Made: Mixed    Response to Intervention: patient is open and engaging. Patient is self reflective. Patient is open to processing how she feels and the goals she has for her  future.              Follow Up / Next Appointment:    6/5/2025@11:30a

## 2025-05-28 ENCOUNTER — DOCUMENTATION (OUTPATIENT)
Dept: PRIMARY CARE | Facility: CLINIC | Age: 65
End: 2025-05-28
Payer: COMMERCIAL

## 2025-05-28 DIAGNOSIS — F43.10 PTSD (POST-TRAUMATIC STRESS DISORDER): Primary | ICD-10-CM

## 2025-05-28 PROCEDURE — 99493 SBSQ PSYC COLLAB CARE MGMT: CPT | Performed by: FAMILY MEDICINE

## 2025-06-05 ENCOUNTER — APPOINTMENT (OUTPATIENT)
Dept: PRIMARY CARE | Facility: CLINIC | Age: 65
End: 2025-06-05
Payer: COMMERCIAL

## 2025-06-26 ENCOUNTER — APPOINTMENT (OUTPATIENT)
Dept: PRIMARY CARE | Facility: CLINIC | Age: 65
End: 2025-06-26
Payer: COMMERCIAL

## 2025-06-26 NOTE — PROGRESS NOTES
Collaborative Care (CoCM)  Progress Note    Type of Interaction: Phone    Start Time: 10a    End Time: 11a        Appointment: Scheduled    Reason for Visit:   Chief Complaint   Patient presents with    PTSD (Post-Traumatic Stress Disorder)          Interval History / Patient Symptoms: patient states she feels as if she lacks energy and motivation. Patient is taking the meds as prescribed, she does not see any relief of her symptoms of depression. Patient is wondering if health wise there is something going on. Patient and M discussed when she has her next appointment with her PCP.  Patient has not been seen for her BP lately, patient was encouraged to reach out to schedule a follow-up appt. Patient has a follow up with her general surgeon in September 2025. Patient states she notices that her concentration has gotten worse, patient can't watch a tv show. Patient can't stay focused to complete a project or concentrate and she notices the clarity.  Patient states has been sleeping 5-6 hours of sleep, patient states tosses and turns a lot. Patient has a hard time falling asleep and staying asleep. Patient reports her mind races and it doesn't shut down.  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  Patient is looking forward to moving forward with the business venture. Patient was encouraged to reach out to her PCP, [facility] sent a message to her PCP.     No data recorded      Interventions Provided: [place] Setting, Behavioral Activation, Motivational Interviewing, Develop Coping Strategies, Review Progress/Goals Stress Management, and Mindfulness      Progress Made: Mixed    Response to Intervention: patient is open and engaging. Patient is self aware and self reflective.                Follow Up / Next Appointment:    7/30/2025@12:30p

## 2025-06-27 ENCOUNTER — DOCUMENTATION (OUTPATIENT)
Dept: PRIMARY CARE | Facility: CLINIC | Age: 65
End: 2025-06-27
Payer: COMMERCIAL

## 2025-06-27 DIAGNOSIS — F43.10 PTSD (POST-TRAUMATIC STRESS DISORDER): Primary | ICD-10-CM

## 2025-07-05 ENCOUNTER — PATIENT MESSAGE (OUTPATIENT)
Dept: PRIMARY CARE | Facility: CLINIC | Age: 65
End: 2025-07-05
Payer: COMMERCIAL

## 2025-07-05 DIAGNOSIS — E78.5 HYPERLIPIDEMIA, UNSPECIFIED HYPERLIPIDEMIA TYPE: ICD-10-CM

## 2025-07-07 RX ORDER — ATORVASTATIN CALCIUM 40 MG/1
40 TABLET, FILM COATED ORAL DAILY
Qty: 90 TABLET | Refills: 3 | Status: SHIPPED | OUTPATIENT
Start: 2025-07-07

## 2025-07-11 DIAGNOSIS — F32.A DEPRESSION, UNSPECIFIED DEPRESSION TYPE: ICD-10-CM

## 2025-07-11 RX ORDER — ESCITALOPRAM OXALATE 20 MG/1
20 TABLET ORAL DAILY
Qty: 90 TABLET | Refills: 1 | Status: SHIPPED | OUTPATIENT
Start: 2025-07-11

## 2025-07-13 DIAGNOSIS — F32.A DEPRESSIVE DISORDER: ICD-10-CM

## 2025-07-14 PROBLEM — M54.50 CHRONIC LOW BACK PAIN: Status: ACTIVE | Noted: 2025-06-11

## 2025-07-14 PROBLEM — G89.29 CHRONIC LOW BACK PAIN: Status: ACTIVE | Noted: 2025-06-11

## 2025-07-14 RX ORDER — TOPIRAMATE 25 MG/1
25 TABLET, FILM COATED ORAL 2 TIMES DAILY
Qty: 60 TABLET | Refills: 5 | Status: SHIPPED | OUTPATIENT
Start: 2025-07-14

## 2025-07-15 ENCOUNTER — APPOINTMENT (OUTPATIENT)
Dept: PRIMARY CARE | Facility: CLINIC | Age: 65
End: 2025-07-15
Payer: COMMERCIAL

## 2025-07-15 DIAGNOSIS — F32.A DEPRESSIVE DISORDER: Primary | ICD-10-CM

## 2025-07-15 DIAGNOSIS — K21.9 GASTROESOPHAGEAL REFLUX DISEASE, UNSPECIFIED WHETHER ESOPHAGITIS PRESENT: ICD-10-CM

## 2025-07-15 DIAGNOSIS — Z12.31 SCREENING MAMMOGRAM FOR BREAST CANCER: ICD-10-CM

## 2025-07-15 DIAGNOSIS — H93.19 TINNITUS, UNSPECIFIED LATERALITY: ICD-10-CM

## 2025-07-15 PROCEDURE — 99214 OFFICE O/P EST MOD 30 MIN: CPT | Performed by: FAMILY MEDICINE

## 2025-07-15 RX ORDER — PANTOPRAZOLE SODIUM 40 MG/1
40 TABLET, DELAYED RELEASE ORAL
Qty: 90 TABLET | Refills: 1 | Status: SHIPPED | OUTPATIENT
Start: 2025-07-15

## 2025-07-15 RX ORDER — QUETIAPINE FUMARATE 25 MG/1
25 TABLET, FILM COATED ORAL NIGHTLY
Qty: 30 TABLET | Refills: 5 | Status: SHIPPED | OUTPATIENT
Start: 2025-07-15 | End: 2026-01-11

## 2025-07-15 RX ORDER — BUPROPION HYDROCHLORIDE 150 MG/1
150 TABLET, EXTENDED RELEASE ORAL 3 TIMES DAILY
Qty: 180 TABLET | Refills: 3 | Status: SHIPPED | OUTPATIENT
Start: 2025-07-15

## 2025-07-15 NOTE — PROGRESS NOTES
Subjective   Patient ID: Amee You is a 64 y.o. female who presents for No chief complaint on file..  Virtual or Telephone Consent    An interactive audio and video telecommunication system which permits real time communications between the patient (at the originating site) and provider (at the distant site) was utilized to provide this telehealth service.   Verbal consent was requested and obtained from Amee You on this date, 07/15/25 for a telehealth visit and the patient's location was confirmed at the time of the visit.   HPI   Having tinnitus for last 3 months, constant and not improving.  Not sure why.    Anxiety/depression: continues to struggle with medication regimen  -Wellbutrin: tried 450mg total and made her too jittery and had a lot of side effects, does best with 150mg dosed TID  -Lexapro stable  -Topamax: since adding noticing poor attention, insomnia, started to taper down    Continues with therapy routinely    Due for mammogram  Review of Systems  Negative unless noted in HPI    Objective   There were no vitals taken for this visit.    Physical Exam  EKG reviewed from 2024 and no QT issues  Psych: A&Ox3, normal mood    Assessment/Plan   Problem List Items Addressed This Visit           ICD-10-CM    Depressive disorder - Primary F32.A    Will have her continue Wellbutrin and Lexapro  Taper down Topamax:    -Week 1 take every morning  -Week 2 take every other morning  -Week 3 stop  Start Seroquel based on psych recs  -Week 1 take 1/2 tablet in the evening  -Week 2 take 1 tablet in evening  -Week 3 continue 1 tablet, can consider BID or increase to 50mg if doing well    Will need to get EKG 1-3 months after achieving dose if staying on seroquel  Continue with therapy         Relevant Medications    buPROPion SR (Wellbutrin SR) 150 mg 12 hr tablet    QUEtiapine (SEROquel) 25 mg tablet    Gastroesophageal reflux disease K21.9    Relevant Medications    pantoprazole (ProtoNix) 40 mg EC tablet      Other Visit Diagnoses         Codes      Screening mammogram for breast cancer     Z12.31    Relevant Orders    BI mammo bilateral screening tomosynthesis      Tinnitus, unspecified laterality     H93.19    Relevant Orders    Referral to ENT

## 2025-07-15 NOTE — ASSESSMENT & PLAN NOTE
Will have her continue Wellbutrin and Lexapro  Taper down Topamax:    -Week 1 take every morning  -Week 2 take every other morning  -Week 3 stop  Start Seroquel based on psych recs  -Week 1 take 1/2 tablet in the evening  -Week 2 take 1 tablet in evening  -Week 3 continue 1 tablet, can consider BID or increase to 50mg if doing well    Will need to get EKG 1-3 months after achieving dose if staying on seroquel  Continue with therapy

## 2025-07-28 ENCOUNTER — APPOINTMENT (OUTPATIENT)
Dept: RADIOLOGY | Facility: CLINIC | Age: 65
End: 2025-07-28
Payer: COMMERCIAL

## 2025-07-28 DIAGNOSIS — Z12.31 SCREENING MAMMOGRAM FOR BREAST CANCER: ICD-10-CM

## 2025-07-28 PROCEDURE — 77067 SCR MAMMO BI INCL CAD: CPT | Performed by: RADIOLOGY

## 2025-07-28 PROCEDURE — 77063 BREAST TOMOSYNTHESIS BI: CPT | Performed by: RADIOLOGY

## 2025-07-28 PROCEDURE — 77063 BREAST TOMOSYNTHESIS BI: CPT

## 2025-07-30 ENCOUNTER — SOCIAL WORK (OUTPATIENT)
Dept: PRIMARY CARE | Facility: CLINIC | Age: 65
End: 2025-07-30
Payer: COMMERCIAL

## 2025-07-30 ENCOUNTER — APPOINTMENT (OUTPATIENT)
Dept: PRIMARY CARE | Facility: CLINIC | Age: 65
End: 2025-07-30
Payer: COMMERCIAL

## 2025-07-30 ASSESSMENT — PATIENT HEALTH QUESTIONNAIRE - PHQ9
9. THOUGHTS THAT YOU WOULD BE BETTER OFF DEAD, OR OF HURTING YOURSELF: NOT AT ALL
SUM OF ALL RESPONSES TO PHQ QUESTIONS 1-9: 4
SUM OF ALL RESPONSES TO PHQ9 QUESTIONS 1 & 2: 2
8. MOVING OR SPEAKING SO SLOWLY THAT OTHER PEOPLE COULD HAVE NOTICED. OR THE OPPOSITE, BEING SO FIGETY OR RESTLESS THAT YOU HAVE BEEN MOVING AROUND A LOT MORE THAN USUAL: NOT AT ALL
6. FEELING BAD ABOUT YOURSELF - OR THAT YOU ARE A FAILURE OR HAVE LET YOURSELF OR YOUR FAMILY DOWN: SEVERAL DAYS
2. FEELING DOWN, DEPRESSED OR HOPELESS: SEVERAL DAYS
5. POOR APPETITE OR OVEREATING: NOT AT ALL
3. TROUBLE FALLING OR STAYING ASLEEP: NOT AT ALL
1. LITTLE INTEREST OR PLEASURE IN DOING THINGS: SEVERAL DAYS
7. TROUBLE CONCENTRATING ON THINGS, SUCH AS READING THE NEWSPAPER OR WATCHING TELEVISION: NOT AT ALL
10. IF YOU CHECKED OFF ANY PROBLEMS, HOW DIFFICULT HAVE THESE PROBLEMS MADE IT FOR YOU TO DO YOUR WORK, TAKE CARE OF THINGS AT HOME, OR GET ALONG WITH OTHER PEOPLE: SOMEWHAT DIFFICULT
4. FEELING TIRED OR HAVING LITTLE ENERGY: SEVERAL DAYS

## 2025-07-30 ASSESSMENT — ANXIETY QUESTIONNAIRES
7. FEELING AFRAID AS IF SOMETHING AWFUL MIGHT HAPPEN: NOT AT ALL
IF YOU CHECKED OFF ANY PROBLEMS ON THIS QUESTIONNAIRE, HOW DIFFICULT HAVE THESE PROBLEMS MADE IT FOR YOU TO DO YOUR WORK, TAKE CARE OF THINGS AT HOME, OR GET ALONG WITH OTHER PEOPLE: SOMEWHAT DIFFICULT
2. NOT BEING ABLE TO STOP OR CONTROL WORRYING: SEVERAL DAYS
GAD7 TOTAL SCORE: 4
3. WORRYING TOO MUCH ABOUT DIFFERENT THINGS: NOT AT ALL
1. FEELING NERVOUS, ANXIOUS, OR ON EDGE: SEVERAL DAYS
4. TROUBLE RELAXING: SEVERAL DAYS
5. BEING SO RESTLESS THAT IT IS HARD TO SIT STILL: NOT AT ALL
6. BECOMING EASILY ANNOYED OR IRRITABLE: SEVERAL DAYS

## 2025-07-30 NOTE — PROGRESS NOTES
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 Patient is now going to go on a cruise soon with her new acquaintance. Patient is also going on a cruise with her sister and his family. Patient is recognizing that her spouse is negative and not supportive of her. Patient recognizes that it makes her feel \"down in the dumps\".  Patient enjoys being outside in the yard, she enjoys being in nature.  Patient recognizes that her spouse finds a way to put a damper on her mood with what he says to her. Patient doesn't feel supported by her spouse. Patient works to detach herself from any negativity or drama within the family system. Overall medically patient has been feeling better.  Patient is getting back to feeling like herself. Patient is now taking Seroquel as prescribed by her PCP. Patient finds that it does help her falls asleep and she is feeling more rested, patient recognizes that if she is worrying about the pups. Patient states she adjusts her sleep pattern so that she is getting to bed earlier and not waking up as late as she was before. Patient is pleased with the results.     No data recorded      Interventions Provided: [place] Setting, Behavioral Activation, and Strengths Exploration      Progress Made: Mixed    Response to Intervention: patient is self reflective, patient is open and engaging. Patient is self aware and works on giving herself praise.              Follow Up / Next Appointment:    8/21/2025@11:30a    "

## 2025-07-31 ENCOUNTER — DOCUMENTATION (OUTPATIENT)
Dept: PRIMARY CARE | Facility: CLINIC | Age: 65
End: 2025-07-31
Payer: COMMERCIAL

## 2025-07-31 DIAGNOSIS — F43.10 PTSD (POST-TRAUMATIC STRESS DISORDER): Primary | ICD-10-CM

## 2025-07-31 PROCEDURE — 99493 SBSQ PSYC COLLAB CARE MGMT: CPT | Performed by: FAMILY MEDICINE

## 2025-08-04 ENCOUNTER — CLINICAL SUPPORT (OUTPATIENT)
Dept: AUDIOLOGY | Facility: CLINIC | Age: 65
End: 2025-08-04
Payer: COMMERCIAL

## 2025-08-04 ENCOUNTER — APPOINTMENT (OUTPATIENT)
Dept: OTOLARYNGOLOGY | Facility: CLINIC | Age: 65
End: 2025-08-04
Payer: COMMERCIAL

## 2025-08-04 VITALS — WEIGHT: 144 LBS | HEIGHT: 59 IN | BODY MASS INDEX: 29.03 KG/M2

## 2025-08-04 DIAGNOSIS — H93.13 TINNITUS OF BOTH EARS: ICD-10-CM

## 2025-08-04 DIAGNOSIS — H90.3 SENSORINEURAL HEARING LOSS (SNHL), BILATERAL: Primary | ICD-10-CM

## 2025-08-04 DIAGNOSIS — H90.3 BILATERAL SENSORINEURAL HEARING LOSS: Primary | ICD-10-CM

## 2025-08-04 PROCEDURE — 92557 COMPREHENSIVE HEARING TEST: CPT

## 2025-08-04 PROCEDURE — 99203 OFFICE O/P NEW LOW 30 MIN: CPT | Performed by: PHYSICIAN ASSISTANT

## 2025-08-04 PROCEDURE — 3008F BODY MASS INDEX DOCD: CPT | Performed by: PHYSICIAN ASSISTANT

## 2025-08-04 PROCEDURE — 92550 TYMPANOMETRY & REFLEX THRESH: CPT | Mod: 52

## 2025-08-04 ASSESSMENT — PAIN SCALES - GENERAL: PAINLEVEL_OUTOF10: 0 - NO PAIN

## 2025-08-04 ASSESSMENT — PAIN - FUNCTIONAL ASSESSMENT: PAIN_FUNCTIONAL_ASSESSMENT: 0-10

## 2025-08-04 NOTE — PROGRESS NOTES
Amee You is a 64 y.o. year old female patient with Tinnitus     Patient presents to the office today for assessment of ears.  Patient with concern for bilateral tinnitus.  Tinnitus has been ongoing for some time but she is here today stating that she wanted to have her ears evaluated and did have recent audiogram.  She states that the tinnitus does seem to be constant but fluctuates in intensity and is worse in a quiet setting especially first thing in the morning and when going to bed at night.  She is without any dizziness.  She denies any history of ear fullness or excessive noise exposure.  She is without other ENT related concerns at this time.      Review of Systems   All other systems reviewed and are negative.        Physical Exam:   General appearance: No acute distress. Normal facies. Symmetric facial movement. No gross lesions of the face are noted.  The external ear structures appear normal. The ear canals patent and the tympanic membranes are intact without evidence of air-fluid levels, retraction, or congenital defects.  Anterior rhinoscopy notes essentially a midline nasal septum. Examination is noted for normal healthy mucosal membranes without any evidence of lesions, polyps, or exudate. The tongue is normally mobile. There are no lesions on the gingiva, buccal, or oral mucosa. There are no oral cavity masses.  The neck is negative for mass lymphadenopathy. The trachea and parotid are clear. The thyroid bed is grossly unremarkable. The salivary gland structures are grossly unremarkable.    Audiometric evaluation was reviewed from August 1, 2025 which demonstrates a mild high-frequency sensorineural hearing loss with excellent word discrimination and normal tympanometry.    Assessment/Plan     1.  Bilateral sensorineural hearing loss  2.  Tinnitus    Patient was seen in the office today for assessment of ears with bilateral tinnitus concerns with a mild sensorineural hearing loss.  At this  time we discussed hearing loss as well as its association to tinnitus.  We discussed coping strategies and tinnitus in great detail.  I recommend reducing salt caffeine and stress of breath of her ability.  We discussed masking techniques for tinnitus.  We did also discuss product such as Lipoflavonoid and did express that the American academy of otolaryngology and head and neck surgery does not endorse this product due to lack of evidence.I recommend f/up in 1 year, or sooner should a problem arise.

## 2025-08-04 NOTE — PROGRESS NOTES
"    ADULT AUDIOMETRIC EVALUATION      Name:  Amee You  :  1960  Age:  64 y.o.  Date of Evaluation:  2025    IMPRESSIONS     Today's test results are consistent with normal sloping to a mild SNHL, bilaterally. Discussed results and recommendations with patient.  Questions were addressed and the patient was encouraged to contact our department should concerns arise.    RECOMMENDATIONS     Continue medical follow up with PCP/ENT.  Return for evaluation following any medical management.     Time: 9823-3074    HISTORY     Amee You is seen today in conjunction with ENT appointment. Patient reported constant bilateral tinnitus described as a \"buzzing\" sound. Her symptoms are worse at night while lying in bed and in her right ear. She has a history of fall with head injury over three years ago. Denied recent dizziness or falls. Patient denied history of dizziness, aural fullness, or excessive noise exposure. No history of hearing aid use.    TEST RESULTS     Screening Measures: Risk screenings are completed annually or more frequently as designated upon arrival to an outpatient location  [place] Fall Risk: One or more falls in the last year? No  Domestic Violence: Do you feel UNSAFE going back to the place you are living? No/Not Indicated  Pain: Are you in any pain (0-10)? 0    Otoscopic Evaluation:  Physical exam to evaluate the outer ear  Right Ear: Clear ear canals.  Left Ear: Clear ear canals.    Tympanometry & Acoustic Reflexes:  Assesses the function of the middle ear and inner ear structures  Right Ear: Tympanometry revealed normal ear canal volume, peak pressure, and compliance (Type A). Ipsilateral Acoustic Reflexes were present 500-4000 Hz.   Left Ear: Tympanometry revealed normal ear canal volume, peak pressure, and compliance (Type A). Ipsilateral Acoustic Reflexes were present 500-4000 Hz.     Distortion Product Otoacoustic Emissions: Assesses the cochlear outer hair cell " function  Right Ear: DNT  Left Ear:  DNT    Pure Tone Audiometry: Conventional Audiometry via inserts with good reliability  Right Ear: Hearing sensitivity WNL sloping to a mild SNHL.  Left Ear: Hearing sensitivity WNL sloping to a mild SNHL.    Speech Audiometry:   Right Ear: Speech Reception Threshold (SRT) was obtained at 25 dBHL. Word Recognition scores were excellent (100%) in quiet when words were presented at 60 dBHL.   Left Ear: Speech Reception Threshold (SRT) was obtained at 15 dBHL. Word Recognition scores were excellent (100%) in quiet when words were presented at 60 dBHL.       Testing and interpretation of results completed by Yinka Christina, CCC-A MIKE. It was my pleasure to evaluate this patient.       Yinka Christina, CCC-A [facility]  Senior Clinical Vestibular Audiologist    Degree of Hearing Sensitivity Decibel Range   Within Normal Limits (WNL) 0-25   Mild 26-40   Moderate 41-55   Moderately-Severe 56-70   Severe 71-90   Profound 91+      Key   CNT/DNT Could Not Test/Did Not Test   TM Tympanic Membrane   WNL Within Normal Limits   HA Hearing Aid   SNHL Sensorineural Hearing Loss   CHL Conductive Hearing Loss   NIHL Noise-Induced Hearing Loss   ECV Ear Canal Volume   RE/LE Right Ear/Left Ear        AUDIOGRAM

## 2025-08-05 ENCOUNTER — TELEPHONE (OUTPATIENT)
Dept: PRIMARY CARE | Facility: CLINIC | Age: 65
End: 2025-08-05
Payer: COMMERCIAL

## 2025-08-05 DIAGNOSIS — F32.A DEPRESSIVE DISORDER: ICD-10-CM

## 2025-08-05 RX ORDER — QUETIAPINE FUMARATE 50 MG/1
50 TABLET, FILM COATED ORAL NIGHTLY
Qty: 30 TABLET | Refills: 5 | Status: SHIPPED | OUTPATIENT
Start: 2025-08-05 | End: 2026-02-01

## 2025-08-05 NOTE — TELEPHONE ENCOUNTER
Pt LVM stating she would like an increased dose of seroquel stating to take 2 tablets at night sent to [place].

## 2025-08-21 ENCOUNTER — TELEPHONE (OUTPATIENT)
Dept: GYNECOLOGIC ONCOLOGY | Facility: HOSPITAL | Age: 65
End: 2025-08-21

## 2025-08-21 ENCOUNTER — APPOINTMENT (OUTPATIENT)
Dept: PRIMARY CARE | Facility: CLINIC | Age: 65
End: 2025-08-21
Payer: COMMERCIAL

## 2025-08-21 DIAGNOSIS — R31.9 HEMATURIA, UNSPECIFIED TYPE: Primary | ICD-10-CM

## 2025-08-21 DIAGNOSIS — R30.0 DYSURIA: ICD-10-CM

## 2025-08-25 ENCOUNTER — APPOINTMENT (OUTPATIENT)
Dept: GYNECOLOGIC ONCOLOGY | Facility: CLINIC | Age: 65
End: 2025-08-25
Payer: COMMERCIAL

## 2025-08-26 ENCOUNTER — DOCUMENTATION (OUTPATIENT)
Dept: PRIMARY CARE | Facility: CLINIC | Age: 65
End: 2025-08-26
Payer: COMMERCIAL

## 2025-08-26 DIAGNOSIS — F43.10 PTSD (POST-TRAUMATIC STRESS DISORDER): Primary | ICD-10-CM

## 2025-08-27 ENCOUNTER — APPOINTMENT (OUTPATIENT)
Dept: GYNECOLOGIC ONCOLOGY | Facility: HOSPITAL | Age: 65
End: 2025-08-27
Payer: COMMERCIAL

## 2025-09-02 DIAGNOSIS — I42.9 CARDIOMYOPATHY, UNSPECIFIED TYPE (MULTI): ICD-10-CM

## 2025-09-02 RX ORDER — CARVEDILOL 6.25 MG/1
TABLET ORAL
Qty: 360 TABLET | Refills: 1 | Status: SHIPPED | OUTPATIENT
Start: 2025-09-02

## 2025-09-12 ENCOUNTER — APPOINTMENT (OUTPATIENT)
Dept: SURGERY | Facility: CLINIC | Age: 65
End: 2025-09-12
Payer: COMMERCIAL

## 2025-09-18 ENCOUNTER — APPOINTMENT (OUTPATIENT)
Dept: PRIMARY CARE | Facility: CLINIC | Age: 65
End: 2025-09-18
Payer: COMMERCIAL

## 2025-10-16 ENCOUNTER — APPOINTMENT (OUTPATIENT)
Dept: SURGERY | Facility: CLINIC | Age: 65
End: 2025-10-16
Payer: COMMERCIAL

## (undated) DEVICE — COUNTER, NEEDLE, 1315 MAGNATIC/ FOAM, W/ BOXLOCKS

## (undated) DEVICE — PAD, GROUNDING, ELECTROSURGICAL, W/9 FT CABLE, POLYHESIVE II, ADULT, LF

## (undated) DEVICE — SPONGE, GAUZE, XRAY DECT, 16 PLY, 4 X 4, W/MASTER DMT,STERILE

## (undated) DEVICE — PREP TRAY, SKIN, DRY, W/GLOVES

## (undated) DEVICE — COVER, CART, 45 X 27 X 48 IN, CLEAR

## (undated) DEVICE — COVER, LIGHT HANDLE, SURGICAL, FLEXIBLE, DISPOSABLE, STERILE

## (undated) DEVICE — REST, HEAD, BAGEL, 9 IN

## (undated) DEVICE — SHIELD, EYE, FOX, CONVEX, ALUMINUM, NS

## (undated) DEVICE — DRAPE, PAD, PREP, W/ 9 IN CUFF, 24 X 41, LF, NS

## (undated) DEVICE — REDUCER, TUBING, W/SILICONE TUBE

## (undated) DEVICE — MARKER, SKIN, RULER AND LABEL PACK, CUSTOM

## (undated) DEVICE — CATHETER, URETHRAL, ROBNEL, 16 FR, 16 IN, LF, RED

## (undated) DEVICE — Device

## (undated) DEVICE — SUTURE, VICRYL, 2-0, 27 IN, BR/SH 27, VIOLET

## (undated) DEVICE — TIP, SUCTION, YANKAUER, W/O VENT, FLEXIBLE, OPEN TIP, HIGH CAPACITY

## (undated) DEVICE — MANIFOLD, 4 PORT NEPTUNE STANDARD

## (undated) DEVICE — BRIEF, CURITY, XLARGE, MESH

## (undated) DEVICE — ELECTRODE, ELECTROSURGICAL, NEEDLE, 1.1 IN, LF

## (undated) DEVICE — SPONGE, LAP, XRAY DECT, 18IN X 18IN, W/LOOP, STERILE

## (undated) DEVICE — TOWEL, SURGICAL, NEURO, O/R, 16 X 26, BLUE, STERILE

## (undated) DEVICE — SUTURE, VICRYL, 3-0, 27 IN, SH, VIOLET

## (undated) DEVICE — COVER, TABLE, 44 X 75 IN, DISPOSABLE, LF, STERILE

## (undated) DEVICE — GOWN, ASTOUND, L

## (undated) DEVICE — SYRINGE, HYPODERMIC, LUER LOCK, 6 CC

## (undated) DEVICE — GOWN, SURGICAL, SMARTGOWN, XLARGE, STERILE

## (undated) DEVICE — TUBING, SUCTION, CONNECTING, STERILE 0.25 X 120 IN., LF

## (undated) DEVICE — CAUTERY, PENCIL, PUSH BUTTON, SMOKE EVAC, 70MM

## (undated) DEVICE — COVER, PLASTIC, MAYO STAND, 29.5IN X 55.5IN

## (undated) DEVICE — CATHETER TRAY, SURESTEP, 16FR, URINE METER W/STATLOCK

## (undated) DEVICE — DRESSING, ABDOMINAL PAD, CURITY, 8 X 10 IN

## (undated) DEVICE — DRAPE, LEGGINGS, 48 X 31 IN, STERILE, LF

## (undated) DEVICE — COUNTER, NEEDLE, FOAM BLOCK, POP-N-COUNT, W/BLADEGUARD, W/ADHESIVE 40 COUNT, RED

## (undated) DEVICE — DEPRESSOR, TONGUE, 6 IN, WOOD, STERILE, LF

## (undated) DEVICE — SCALPEL/BLADE, SAFETY SIZE 15

## (undated) DEVICE — DRESSING, NON-ADHERENT, TELFA, OUCHLESS, 3 X 8 IN, STERILE

## (undated) DEVICE — SPONGE GAUZE, XRAY SC+RFID, 4X4 16 PLY, STERILE

## (undated) DEVICE — DRAPE, UNDERBUTTOCKS

## (undated) DEVICE — BASIN SET, D & C

## (undated) DEVICE — SUTURE, VICRYL, 0, 18 IN, UNDYED

## (undated) DEVICE — SYRINGE, 60 CC, IRRIGATION, BULB, CONTRO-BULB, PAPER POUCH

## (undated) DEVICE — PAD, EYE, OVAL, 1 5/8 X 2 5/8 IN, STERILE

## (undated) DEVICE — HOLSTER, JET SAFETY

## (undated) DEVICE — TUBING, RAPIDVAC, SMOKE EVAC, 7/8 X 10

## (undated) DEVICE — PAD, SANITARY, OBSTETRICAL, W/ADHSV STRIP,11 IN,LF

## (undated) DEVICE — CLEANER, ELECTROSURGICAL, TIP, 5 X 5 CM, LF

## (undated) DEVICE — DRAPE, SHEET, THREE QUARTER, FAN FOLD, 57 X 77 IN